# Patient Record
Sex: MALE | Race: BLACK OR AFRICAN AMERICAN | NOT HISPANIC OR LATINO | Employment: OTHER | URBAN - METROPOLITAN AREA
[De-identification: names, ages, dates, MRNs, and addresses within clinical notes are randomized per-mention and may not be internally consistent; named-entity substitution may affect disease eponyms.]

---

## 2017-02-14 ENCOUNTER — HOSPITAL ENCOUNTER (INPATIENT)
Facility: HOSPITAL | Age: 72
LOS: 24 days | Discharge: HOME | DRG: 057 | End: 2017-03-10
Attending: PHYSICAL MEDICINE & REHABILITATION | Admitting: PHYSICAL MEDICINE & REHABILITATION
Payer: MEDICARE

## 2017-02-14 DIAGNOSIS — I63.9 CEREBROVASCULAR ACCIDENT (CVA), UNSPECIFIED MECHANISM: ICD-10-CM

## 2017-02-14 DIAGNOSIS — I73.9 PVD (PERIPHERAL VASCULAR DISEASE): ICD-10-CM

## 2017-02-14 DIAGNOSIS — Z95.810 PRESENCE OF IMPLANTABLE CARDIOVERTER-DEFIBRILLATOR (ICD): ICD-10-CM

## 2017-02-14 DIAGNOSIS — Z72.0 TOBACCO ABUSE: ICD-10-CM

## 2017-02-14 DIAGNOSIS — I25.10 CORONARY ARTERY DISEASE, ANGINA PRESENCE UNSPECIFIED, UNSPECIFIED VESSEL OR LESION TYPE, UNSPECIFIED WHETHER NATIVE OR TRANSPLANTED HEART: ICD-10-CM

## 2017-02-14 DIAGNOSIS — E44.1 MILD MALNUTRITION: ICD-10-CM

## 2017-02-14 DIAGNOSIS — Z91.199 H/O NONCOMPLIANCE WITH MEDICAL TREATMENT, PRESENTING HAZARDS TO HEALTH: ICD-10-CM

## 2017-02-14 DIAGNOSIS — I25.5 ISCHEMIC CARDIOMYOPATHY: ICD-10-CM

## 2017-02-14 DIAGNOSIS — I63.9 CVA (CEREBRAL VASCULAR ACCIDENT): ICD-10-CM

## 2017-02-14 PROCEDURE — 25000003 PHARM REV CODE 250: Performed by: PHYSICAL MEDICINE & REHABILITATION

## 2017-02-14 PROCEDURE — 12800000 HC REHAB SEMI-PRIVATE ROOM

## 2017-02-14 RX ORDER — CLOPIDOGREL BISULFATE 75 MG/1
75 TABLET ORAL DAILY
Status: DISCONTINUED | OUTPATIENT
Start: 2017-02-15 | End: 2017-03-10 | Stop reason: HOSPADM

## 2017-02-14 RX ORDER — LISINOPRIL 2.5 MG/1
2.5 TABLET ORAL DAILY
Status: DISCONTINUED | OUTPATIENT
Start: 2017-02-15 | End: 2017-03-10 | Stop reason: HOSPADM

## 2017-02-14 RX ORDER — ASPIRIN 81 MG/1
81 TABLET ORAL DAILY
Status: DISCONTINUED | OUTPATIENT
Start: 2017-02-15 | End: 2017-03-10 | Stop reason: HOSPADM

## 2017-02-14 RX ORDER — METOPROLOL TARTRATE 25 MG/1
25 TABLET, FILM COATED ORAL 2 TIMES DAILY
Status: ON HOLD | COMMUNITY
End: 2017-03-10 | Stop reason: HOSPADM

## 2017-02-14 RX ORDER — METOPROLOL TARTRATE 25 MG/1
25 TABLET, FILM COATED ORAL 2 TIMES DAILY
Status: DISCONTINUED | OUTPATIENT
Start: 2017-02-14 | End: 2017-03-10 | Stop reason: HOSPADM

## 2017-02-14 RX ADMIN — METOPROLOL TARTRATE 25 MG: 25 TABLET ORAL at 08:02

## 2017-02-14 NOTE — IP AVS SNAPSHOT
[address] [place] [address]  Phone: [phone]           Patient Discharge Instructions     Our goal is to set you up for success. This packet includes information on your condition, medications, and your home care. It will help you to care for yourself so you don't get sicker and need to go back to the hospital.     Please ask your nurse if you have any questions.        There are many details to remember when preparing to leave the hospital. Here is what you will need to do:    1. Take your medicine. If you are prescribed medications, review your Medication List in the following pages. You may have new medications to  at the pharmacy and others that you'll need to stop taking. Review the instructions for how and when to take your medications. Talk with your doctor or nurses if you are unsure of what to do.     2. Go to your follow-up appointments. Specific follow-up information is listed in the following pages. Your may be contacted by a transition nurse or clinical provider about future appointments. Be sure we have all of the phone numbers to reach you, if needed. Please contact your provider's office if you are unable to make an appointment.     3. Watch for warning signs. Your doctor or nurse will give you detailed warning signs to watch for and when to call for assistance. These instructions may also include educational information about your condition. If you experience any of warning signs to your health, call your doctor.               ** Verify the list of medication(s) below is accurate and up to date. Carry this with you in case of emergency. If your medications have changed, please notify your healthcare provider.             Medication List      START taking these medications        Additional Info                      aspirin 81 MG EC tablet   Commonly known as:  ECOTRIN   Quantity:  30 tablet   Refills:  1   Dose:  81 mg    Last time this  was given:  81 mg on 3/10/2017  9:15 AM   Instructions:  Take 1 tablet (81 mg total) by mouth once daily.     Begin Date    AM    Noon    PM    Bedtime       atorvastatin 80 MG tablet   Commonly known as:  LIPITOR   Quantity:  30 tablet   Refills:  1   Dose:  80 mg    Last time this was given:  80 mg on 3/10/2017  9:14 AM   Instructions:  Take 1 tablet (80 mg total) by mouth once daily.     Begin Date    AM    Noon    PM    Bedtime       brimonidine 0.1% 0.1 % Drop   Commonly known as:  ALPHAGAN P   Quantity:  10 mL   Refills:  1   Dose:  1 drop    Last time this was given:  1 drop on 3/10/2017  9:14 AM   Instructions:  Place 1 drop into both eyes 2 (two) times daily.     Begin Date    AM    Noon    PM    Bedtime       clopidogrel 75 mg tablet   Commonly known as:  PLAVIX   Quantity:  30 tablet   Refills:  1   Dose:  75 mg    Last time this was given:  75 mg on 3/10/2017  9:14 AM   Instructions:  Take 1 tablet (75 mg total) by mouth once daily.     Begin Date    AM    Noon    PM    Bedtime       docusate sodium 100 MG capsule   Commonly known as:  COLACE   Quantity:  30 capsule   Refills:  1   Dose:  100 mg    Last time this was given:  100 mg on 3/10/2017  9:15 AM   Instructions:  Take 1 capsule (100 mg total) by mouth once daily.     Begin Date    AM    Noon    PM    Bedtime       dorzolamide-timolol 2-0.5% 22.3-6.8 mg/mL ophthalmic solution   Commonly known as:  COSOPT   Quantity:  10 mL   Refills:  1   Dose:  1 drop    Last time this was given:  1 drop on 3/10/2017  9:13 AM   Instructions:  Place 1 drop into both eyes 2 (two) times daily.     Begin Date    AM    Noon    PM    Bedtime       latanoprost 0.005 % ophthalmic solution   Quantity:  2.5 mL   Refills:  1   Dose:  1 drop    Last time this was given:  1 drop on 3/9/2017  9:06 PM   Instructions:  Place 1 drop into both eyes every evening.     Begin Date    AM    Noon    PM    Bedtime       lisinopril 2.5 MG tablet   Commonly known as:  PRINIVIL,ZESTRIL    Quantity:  30 tablet   Refills:  1   Dose:  2.5 mg    Last time this was given:  2.5 mg on 3/9/2017  8:55 AM   Instructions:  Take 1 tablet (2.5 mg total) by mouth once daily.     Begin Date    AM    Noon    PM    Bedtime         CONTINUE taking these medications        Additional Info                      metoprolol tartrate 25 MG tablet   Commonly known as:  LOPRESSOR   Quantity:  30 tablet   Refills:  1   Dose:  25 mg    Last time this was given:  25 mg on 3/9/2017  9:05 PM   Instructions:  Take 1 tablet (25 mg total) by mouth 2 (two) times daily.     Begin Date    AM    Noon    PM    Bedtime            Where to Get Your Medications      You can get these medications from any pharmacy     Bring a paper prescription for each of these medications     aspirin 81 MG EC tablet    atorvastatin 80 MG tablet    brimonidine 0.1% 0.1 % Drop    clopidogrel 75 mg tablet    docusate sodium 100 MG capsule    dorzolamide-timolol 2-0.5% 22.3-6.8 mg/mL ophthalmic solution    latanoprost 0.005 % ophthalmic solution    lisinopril 2.5 MG tablet    metoprolol tartrate 25 MG tablet                  Please bring to all follow up appointments:    1. A copy of your discharge instructions.  2. All medicines you are currently taking in their original bottles.  3. Identification and insurance card.    Please arrive 15 minutes ahead of scheduled appointment time.    Please call 24 hours in advance if you must reschedule your appointment and/or time.        Follow-up Information     Follow up with Munir Claros MD.    Specialty:  Cardiology    Why:  follow up with cardiology within 2 weeks for re-evaluation and to have your device (ICD) checked     Contact information:    [facility]ILL[facility]  [address] FLO[address]  [facility] [id number] [phone]          Follow up with Primary Doctor No In 2 weeks.    Why:  Follow up after hospital discharge          Discharge Instructions       Wheelchair and rolling walker for home use ordered from [facility] in [place].  Their phone # is [phone].    Referral made to [facility] [place] [facility] for PT.  Their phone # is [phone]. Your first appt will be Monday, 3/13/17 @ 1:00.  Please arrive 45 minutes early and go to the out-patient registration desk.       Monitor patient heart rate, and do not give Metoprolol 25 if heart rate less than 60 and notify MD for further instructions.   Monitor patient blood pressure and do not give Lisinopril 2.5mg if less than 110/70 and notify MD for further instructions.       Admission Information     Date & Time Provider Department CSN    2/14/2017  7:23 PM Aleah Garrido MD [facility] [id number]       Admisson Diagnosis: CVA (cerebral vascular accident)      Care Providers     Provider Role Specialty Primary office phone    Aleah Garrido MD Attending Provider General Practice [phone]    Leland Heredia MD Consulting Physician  Cardiology  [phone]    Jaja Mahoney NP Consulting Provider  Cardiology  [phone]      Your Vitals Were     BP Pulse Temp Resp Height Weight    92/60 52 98.5 °F (36.9 °C) (Oral) 18 5' 6" (1.676 m) 67.4 kg (148 lb 8 oz)    SpO2 BMI             97% 23.97 kg/m2         Recent Lab Values     No lab values to display.      Allergies as of 3/10/2017     No Known Allergies      [facility]     [facility] Nurse Care Line - 24/7 Assistance  Unless otherwise directed by your provider, please contact [facility], our nurse care line that is available for 24/7 assistance.     Registered nurses in the [facility] provide clinical advisement, health education, appointment booking, and other advisory services.  Call for this free service at [phone].        Advance Directives     An advance directive is a document which, in the event you are no longer able to make decisions for yourself, tells your healthcare team what kind of treatment you do or do not want to " receive, or who you would like to make those decisions for you.  If you do not currently have an advance directive, [facility] encourages you to create one.  For more information call:  [phone]), [phone] ([phone]), or log on to [email or web address].        Smoking Cessation     If you would like to quit smoking:   You may be eligible for free services if you are a [place] resident and started smoking cigarettes before September 1, 1988.  Call the [facility] toll free at [phone] or [phone].      Call 3-526-QUIT-NOW if you do not meet the above criteria.        Translation Services Information     ATTENTION: Language assistance services are available, free of charge. Please call [phone].    ATENCIÓN: Si habla español, tiene a ramesh disposición servicios gratuitos de asistencia lingüística. Llame al [phone].     [facility] Ý: N?u b?n nói Ti?ng Vi?t, có các d?ch v? h? tr? ngôn ng? mi?n phí dành cho b?n. G?i s? [phone].        Stroke Education              [facility] Sign-Up     Activating your [facility] account is as easy as 1-2-3!     1) Visit [email or web address], select Sign Up Now, enter this activation code and your date of birth, then select Next.  [email or web address]  Expires: 4/24/2017 12:09 PM      2) Create a username and password to use when you visit [facility] in the future and select a security question in case you lose your password and select Next.    3) Enter your e-mail address and click Sign Up!    Additional Information  If you have questions, please e-mail [email or web address] or call [phone] to talk to our [facility] staff. Remember, [facility] is NOT to be used for urgent needs. For medical emergencies, dial 911.          [facility] complies with applicable Federal civil rights laws and does not discriminate on the basis of race, color, national origin, age, disability, or sex.

## 2017-02-15 LAB
ALBUMIN SERPL BCP-MCNC: 3.6 G/DL
ALP SERPL-CCNC: 73 U/L
ALT SERPL W/O P-5'-P-CCNC: 16 U/L
ANION GAP SERPL CALC-SCNC: 11 MMOL/L
AST SERPL-CCNC: 21 U/L
BASOPHILS # BLD AUTO: 0 K/UL
BASOPHILS NFR BLD: 0.6 %
BILIRUB SERPL-MCNC: 0.5 MG/DL
BILIRUB UR QL STRIP: NEGATIVE
BUN SERPL-MCNC: 19 MG/DL
CALCIUM SERPL-MCNC: 9.7 MG/DL
CHLORIDE SERPL-SCNC: 106 MMOL/L
CLARITY UR: CLEAR
CO2 SERPL-SCNC: 20 MMOL/L
COLOR UR: YELLOW
CREAT SERPL-MCNC: 1.1 MG/DL
DIFFERENTIAL METHOD: ABNORMAL
EOSINOPHIL # BLD AUTO: 0.1 K/UL
EOSINOPHIL NFR BLD: 1.9 %
ERYTHROCYTE [DISTWIDTH] IN BLOOD BY AUTOMATED COUNT: 15.1 %
EST. GFR  (AFRICAN AMERICAN): >60 ML/MIN/1.73 M^2
EST. GFR  (NON AFRICAN AMERICAN): >60 ML/MIN/1.73 M^2
GIANT PLATELETS BLD QL SMEAR: PRESENT
GLUCOSE SERPL-MCNC: 97 MG/DL
GLUCOSE UR QL STRIP: NEGATIVE
HCT VFR BLD AUTO: 49.5 %
HGB BLD-MCNC: 16.4 G/DL
HGB UR QL STRIP: NEGATIVE
KETONES UR QL STRIP: NEGATIVE
LEUKOCYTE ESTERASE UR QL STRIP: NEGATIVE
LYMPHOCYTES # BLD AUTO: 2 K/UL
LYMPHOCYTES NFR BLD: 28.9 %
MCH RBC QN AUTO: 27.8 PG
MCHC RBC AUTO-ENTMCNC: 33 %
MCV RBC AUTO: 84 FL
MONOCYTES # BLD AUTO: 0.9 K/UL
MONOCYTES NFR BLD: 12.7 %
NEUTROPHILS # BLD AUTO: 3.9 K/UL
NEUTROPHILS NFR BLD: 55.9 %
NITRITE UR QL STRIP: NEGATIVE
PH UR STRIP: 7 [PH] (ref 5–8)
PLATELET # BLD AUTO: 201 K/UL
PLATELET BLD QL SMEAR: ABNORMAL
PMV BLD AUTO: 11.2 FL
POTASSIUM SERPL-SCNC: 4.3 MMOL/L
PROT SERPL-MCNC: 7.5 G/DL
PROT UR QL STRIP: NEGATIVE
RBC # BLD AUTO: 5.88 M/UL
SODIUM SERPL-SCNC: 137 MMOL/L
SP GR UR STRIP: 1.01 (ref 1–1.03)
URN SPEC COLLECT METH UR: NORMAL
UROBILINOGEN UR STRIP-ACNC: NEGATIVE EU/DL
WBC # BLD AUTO: 7.1 K/UL

## 2017-02-15 PROCEDURE — 97162 PT EVAL MOD COMPLEX 30 MIN: CPT

## 2017-02-15 PROCEDURE — 85025 COMPLETE CBC W/AUTO DIFF WBC: CPT

## 2017-02-15 PROCEDURE — 80053 COMPREHEN METABOLIC PANEL: CPT

## 2017-02-15 PROCEDURE — 97530 THERAPEUTIC ACTIVITIES: CPT

## 2017-02-15 PROCEDURE — 36415 COLL VENOUS BLD VENIPUNCTURE: CPT

## 2017-02-15 PROCEDURE — 12800000 HC REHAB SEMI-PRIVATE ROOM

## 2017-02-15 PROCEDURE — 81003 URINALYSIS AUTO W/O SCOPE: CPT

## 2017-02-15 PROCEDURE — 87086 URINE CULTURE/COLONY COUNT: CPT

## 2017-02-15 PROCEDURE — 97165 OT EVAL LOW COMPLEX 30 MIN: CPT

## 2017-02-15 PROCEDURE — 25000003 PHARM REV CODE 250: Performed by: PHYSICAL MEDICINE & REHABILITATION

## 2017-02-15 PROCEDURE — 92610 EVALUATE SWALLOWING FUNCTION: CPT

## 2017-02-15 RX ORDER — LACTULOSE 10 G/15ML
20 SOLUTION ORAL DAILY PRN
Status: DISCONTINUED | OUTPATIENT
Start: 2017-02-15 | End: 2017-03-10

## 2017-02-15 RX ORDER — ENOXAPARIN SODIUM 100 MG/ML
40 INJECTION SUBCUTANEOUS EVERY 24 HOURS
Status: DISCONTINUED | OUTPATIENT
Start: 2017-02-16 | End: 2017-03-10 | Stop reason: HOSPADM

## 2017-02-15 RX ORDER — BISACODYL 10 MG
10 SUPPOSITORY, RECTAL RECTAL DAILY PRN
Status: DISCONTINUED | OUTPATIENT
Start: 2017-02-15 | End: 2017-03-10

## 2017-02-15 RX ORDER — DOCUSATE SODIUM 100 MG/1
100 CAPSULE, LIQUID FILLED ORAL DAILY
Status: DISCONTINUED | OUTPATIENT
Start: 2017-02-16 | End: 2017-03-10 | Stop reason: HOSPADM

## 2017-02-15 RX ADMIN — ASPIRIN 81 MG: 81 TABLET, COATED ORAL at 08:02

## 2017-02-15 RX ADMIN — CLOPIDOGREL BISULFATE 75 MG: 75 TABLET ORAL at 08:02

## 2017-02-15 RX ADMIN — METOPROLOL TARTRATE 25 MG: 25 TABLET ORAL at 08:02

## 2017-02-15 NOTE — PT/OT/SLP EVAL
"PhysicalTherapy   Evaluation    Forest Villanueva Jr.   MRN: [id number]     PT Received On: 02/15/17  PT Start Time: 1320     PT Stop Time: 1415    PT Total Time (min): 55 min       Billable Minutes:  Evaluation 45  Total Minutes: 45    Diagnosis:  CVA  History reviewed. No pertinent past medical history.   Past Surgical History   Procedure Laterality Date    Eye surgery      Coronary artery bypass graft      Cardiac pacemaker placement         Referring physician: Hema  Date referred to PT: 2017    General Precautions: Standard, fall, pacemaker       Patient History:  Living Arrangements: house  Home Layout: Able to live on 1st floor  Equipment Currently Used at Home: cane, straight      Previous Level of Function: (per pt)  Ambulation Skills: needs device  Transfer Skills: independent  ADL Skills: independent    Subjective:  Patient goals: "restore my strength"    Pain Ratin/10  Location - Side: Left     Location: chest  Pain Addressed: Distraction, Reposition, Cessation of Activity  Pain Rating Post-Intervention: 2/10    Objective:  Patient found seated in w/c.  Patient found with: peripheral IV    Cognitive Exam:    Physical Exam:    Upper Extremity Range of Motion:  Refer to OT evaluation for UE ROM.    Upper Extremity Strength:  Refer to OT evaluation for UE strength.    Lower Extremity Range of Motion:  Right Lower Extremity: WFL  Left Lower Extremity: WFL    Lower Extremity Strength:  Right Lower Extremity: Hip flex 3-/5, knee exten 4-/5  Left Lower Extremity: Hip flex 4/5, knee exten 4+/5     Functional Mobility:    Bed Mobility :   Supine to sit: Moderate Assistance   Sit to supine: Moderate Assistance   Rolling: Maximum Assistance     Transfers:  Sit to stand:Moderate Assistance with Rolling Walker  x 3  Bed <> Chair:  Stand Pivot with Moderate Assistance with No Assistive Device      Wheelchair: 100' with min assist and cues    Gait: 25' with RW with mod assist and cues     Balance:   Static " Sit: FAIR: Maintains without assist, but unable to take any challenges   Dynamic Sit:  FAIR: Cannot move trunk without losing balance  Static Stand: POOR: Needs MODERATE assist to maintain  Dynamic stand: POOR: N/A    Endurance deficit:  Only able to ambulate 25'     Patient left supine with call button in reach.    Assessment:  Forest Villanueva Jr. is a 71 y.o. male with a medical diagnosis of CVA. He presents with limited functional mobility. Will benefit from PT to address above deficits.    Rehab potential is good.    Activity tolerance: Fair    Plan: plan care intiated, bed mobility initiated, transfer training iniated, gait training, balance training, strengthening, neuromuscular re-education, motor coordination training and wheelchair management/propulsion    GOALS:   Physical Therapy Goals        Problem: Physical Therapy Goal    Goal Priority Disciplines Outcome Goal Variances Interventions   Physical Therapy Goal     PT/OT, PT      Description:  Goals to be met by: 3/17/2017     Patient will increase functional independence with mobility by performin). Supine to sit with Modified [place]  2). Sit to supine with Modified [place]  3). Sit to stand transfer with Stand-by Assistance  4). Bed to chair transfer with Stand-by Assistance   5). Gait  x > 150 feet with Stand-by Assistance   6). Wheelchair propulsion x > 200 feet with Modified [place]               PLAN:    Patient to be seen daily to address the above listed problems via gait training, therapeutic activities, therapeutic exercises, neuromuscular re-education, wheelchair management/training  Plan of Care expires: 17  Plan of Care reviewed with: patient          Christ T Chapo, PT 2/15/2017

## 2017-02-15 NOTE — PLAN OF CARE
Problem: Fall Risk (Adult)  Goal: Identify Related Risk Factors and Signs and Symptoms  Related risk factors and signs and symptoms are identified upon initiation of Human Response Clinical Practice Guideline (CPG)   Outcome: Ongoing (interventions implemented as appropriate)  Pt alert and oriented. Bed and chair alarm at all times. Call light in reach.

## 2017-02-15 NOTE — PT/OT/SLP EVAL
Occupational Therapy  Evaluation            >>>>PACEMAKER PRECAUTIONS<<<<    Forest Villanueva Jr.   MRN: [id number]   Admitting Diagnosis: CVA    OT Date of Treatment: 02/15/17   OT Start Time: 0935  OT Stop Time: 1050  OT Total Time (min): 75 min    Billable Minutes:  Evaluation 60 and Therapeutic Activity 15  Total Minutes: 75    Diagnosis: CVA      History reviewed. No pertinent past medical history.   Past Surgical History   Procedure Laterality Date    Eye surgery      Coronary artery bypass graft      Cardiac pacemaker placement         Referring physician: Hema  Date referred to OT: 2/14/17    General Precautions: Standard,    Precautions:  PACEMAKER  Braces:      Do you have any cultural, spiritual, Confucianist conflicts, given your current situation?: Congregation     Patient History:  Living Environment  Lives With: sibling(s) (sister)  Living Arrangements: house  Home Accessibility: stairs to enter home  Home Layout: Able to live on 1st floor  Number of Stairs to Enter Home: 1  Stair Railings at Home: none  Living Environment Comment: Pt lives in [facility] with 1 DEE DEE with sister in [place] with tub/shower combo - PTA, pt completely (I) with all I/ADL and owns no DME. Pt did not drive  Equipment Currently Used at Home: none    Prior level of function:   Bed Mobility/Transfers: independent  Grooming: independent  Bathing: independent  Upper Body Dressing: independent  Lower Body Dressing: independent  Toileting: independent  Home Management Skills:  (shares with sisterNatalie)  Homemaking Responsibilities:  (Shares responsibilities with sisterNatalie)  Driving License: No  Mode of Transportation: Family     Dominant hand: right    Subjective:  Communicated with nurse prior to session.    Chief Complaint: Needing to urinate, wanting to smoke a cigarette, and weakness  Patient/Family stated goals: For patient to get stronger and return home; patient's sister also reports she would like to ask the doctor if patient can  get on a medication for bladder urgency, get a nicotine patch and reports patient was receiving medication for sinuses and eye drops (daily) - all information and requests communicated to pt's nurse, Chito Dale acknowledging    Pain Ratin/10        Pain Rating Post-Intervention: 0/10    Objective:  Patient found with: peripheral IV and pacemaker    Cognitive Exam:  Oriented to: Person only  Follows Commands/attention: Follows one-step commands - pt given increased time for processing  Communication: dysarthria  Memory:  See ST eval  Safety awareness/insight to disability: impaired  Coping skills/emotional control: Appropriate to situation for majority of evaluation; at start of OT evaluation, pt tearful when sister and other family arriving     Visual/perceptual:  Pt wears bifocals; otherwise intact to perform self care tasks  -  To be further assessed    Physical Exam:  Postural examination/scapula alignment: Rounded shoulder and Head forward  Skin integrity: Visible skin intact  Edema:    Sensation:   Light touch intact bilaterally    Upper Extremity Range of Motion:  Right Upper Extremity: WFL  Left Upper Extremity: (L) shoulder limited 2* pacemaker precautions; distally, WFL    Upper Extremity Strength:  Right Upper Extremity: 3-/5 to 3+/5   Left Upper Extremity: N/T 2* pacemaker precautions; functional   Strength: 3+/5 bilaterally    Fine motor coordination:   Impaired  Left hand thumb/finger opposition skills mild/mod, Left hand, manipulation of objects mild/mod     Gross motor coordination: L UE mild impairment     Functional Mobility:  Bed Mobility:   Supine to sit: Total (A) - Mod/Max (A) x 2 staff members   Scooting: Total (A) - Mod/Max (A) x 2 staff members    Transfers:   Sit to stand:Total Assistance with grab bar once standing OTR providing instruction for correct/safe transfer technique & proper hand placement - Mod/Min (A) x 2 ppl for pt/staff safety  Bed <> Chair:  Stand Pivot with  Total Assistance with No Assistive Device OTR providing skilled instruction for correct t/f technique & proper hand placement - Mod/Max (A) x 2 ppl for pt/staff safety  Toilet Transfer:  Pt Stand Pivot with Total Assistance with grab bar once standing and bedside commode positioned over toilet - OTR providing skilled instruction for correct t/f technique & proper hand placement - Mod/Max (A) x 2 ppl for pt/staff safety    Feeding:  Min (A)    Grooming:  Min (A) - step by step instruction with occasional [place] to perform grooming tasks sitting sink side    Bathing:  Max (A)    UE Dressing:  Mod (A) from wheelchair level - pt instructed on edwardo dressing techniques; step by step instruction provided with occasional [place] and Mod (A) to complete taskis    LE Dressing:  Patient don/doffed socks with Total Assistance, Patient don/doffed shoes with Total Assistance, Patient don/doffed adult brief with Total Assistance and Patient don/doffed pants with Total Assistance    Toileting:  Pt performed toileting with MaxAssistance with grab bar and bedside commode positioned over toilet     Balance:   Static Sit: SBA/CGA  Dynamic Sit: Min/Mod (A)  Static Stand: Mod (A) with B UE support of grab bar    Additional Treatment:  -Patient, sister, and additional family members educated/instructed on OT role/POC, inpatient rehab therapy and schedule, visiting hours, safety - calling for (A), correct transfer techniques, adaptive techniques to increase functional independence, etc - pt will benefit from daily reinforcement of all education    Patient left up in chair with all lines intact, call button in reach, chair alarm on, nurse notified and family members present    Assessment:  Forest Villanueva Jr. is a 71 y.o. male with a medical diagnosis of CVA and presents with decreased functional independence as prior to admit, pt completely independent with all ADL/IADL without use of DME, but not driving. Currently, pt requires Total (A) to  perform functional transfers and self care tasks and demonstrates weakness, impaired balance, impaired coordination, decreased endurance, disorientation, decreased insight, decreased safety. Patient should benefit from skilled OT services to address deficits and increase functional independence, mobility, and safety.    Rehab potential is good    Activity tolerance: Good    Discharge recommendations: home     Equipment recommendations:  (TBD; likely BSC and transfer tub bench)     GOALS:   Occupational Therapy Goals        Problem: Occupational Therapy Goal    Goal Priority Disciplines Outcome Interventions   Occupational Therapy Goal     OT, PT/OT     Description:  Goals to be met by: 3/14/17     Patient will increase functional independence with ADLs by performing:    Feeding with Set-up Assistance.  UE Dressing with Set-up Assistance.  LE Dressing with Set-up Assistance.  Grooming while seated with Set-up Assistance.  Toileting from toilet with Set-up Assistance for hygiene and clothing management.   Bathing from  shower chair/bench with Set-up Assistance.  Shower transfer with Supervision.  Toilet transfer to toilet with Supervision.                PLAN: Patient to be seen 6 x/week to address the above listed problems via self-care/home management, community/work re-entry, therapeutic activities, therapeutic exercises, therapeutic groups, neuromuscular re-education, cognitive retraining, sensory integration, wheelchair management/training  Plan of Care expires: 03/14/17  Plan of Care reviewed with: patient         GERRY Sheehan LOTR  02/15/2017

## 2017-02-15 NOTE — PLAN OF CARE
Problem: Patient Care Overview  Goal: Plan of Care Review  Outcome: Ongoing (interventions implemented as appropriate)  Reviewed POC, voice understanding, denies discomfort, AOX3, stress incontinence of bladder , transfer min/mod assist, safety maintained

## 2017-02-15 NOTE — PROGRESS NOTES
Spoke with patient and his sister and girlfriend to complete assessment.  Patient lives in [place] with his girlfriend who does not work.  Patient has a cane at home.  Patient has been having Home Health services through [facility].  [facility] uses [facility].  Patient plans to return home with his sister, Natalie Steward wh lives at [address] In [place].  Patient's girlfriend will also help with his care when his sister works parttime.  SW will follow and assist with discharge planning as needed.

## 2017-02-16 LAB — BACTERIA UR CULT: NORMAL

## 2017-02-16 PROCEDURE — 97110 THERAPEUTIC EXERCISES: CPT

## 2017-02-16 PROCEDURE — 12800000 HC REHAB SEMI-PRIVATE ROOM

## 2017-02-16 PROCEDURE — 92523 SPEECH SOUND LANG COMPREHEN: CPT

## 2017-02-16 PROCEDURE — 97535 SELF CARE MNGMENT TRAINING: CPT

## 2017-02-16 PROCEDURE — 97530 THERAPEUTIC ACTIVITIES: CPT

## 2017-02-16 PROCEDURE — 63600175 PHARM REV CODE 636 W HCPCS: Performed by: PHYSICAL MEDICINE & REHABILITATION

## 2017-02-16 PROCEDURE — 25000003 PHARM REV CODE 250: Performed by: PHYSICAL MEDICINE & REHABILITATION

## 2017-02-16 PROCEDURE — 63700000 PHARM REV CODE 250 ALT 637 W/O HCPCS: Performed by: PHYSICAL MEDICINE & REHABILITATION

## 2017-02-16 RX ORDER — LATANOPROST 50 UG/ML
1 SOLUTION/ DROPS OPHTHALMIC NIGHTLY
Status: DISCONTINUED | OUTPATIENT
Start: 2017-02-16 | End: 2017-03-10 | Stop reason: HOSPADM

## 2017-02-16 RX ORDER — DORZOLAMIDE HYDROCHLORIDE AND TIMOLOL MALEATE 20; 5 MG/ML; MG/ML
1 SOLUTION/ DROPS OPHTHALMIC 2 TIMES DAILY
Status: DISCONTINUED | OUTPATIENT
Start: 2017-02-16 | End: 2017-03-10 | Stop reason: HOSPADM

## 2017-02-16 RX ORDER — BRIMONIDINE TARTRATE 1 MG/ML
1 SOLUTION/ DROPS OPHTHALMIC 2 TIMES DAILY
Status: DISCONTINUED | OUTPATIENT
Start: 2017-02-16 | End: 2017-03-10 | Stop reason: HOSPADM

## 2017-02-16 RX ADMIN — DORZOLAMIDE HYDROCHLORIDE AND TIMOLOL MALEATE 1 DROP: 20; 5 SOLUTION/ DROPS OPHTHALMIC at 09:02

## 2017-02-16 RX ADMIN — LATANOPROST 1 DROP: 50 SOLUTION OPHTHALMIC at 09:02

## 2017-02-16 RX ADMIN — METOPROLOL TARTRATE 25 MG: 25 TABLET ORAL at 09:02

## 2017-02-16 RX ADMIN — BRIMONIDINE TARTRATE 1 DROP: 1 SOLUTION/ DROPS OPHTHALMIC at 09:02

## 2017-02-16 RX ADMIN — ASPIRIN 81 MG: 81 TABLET, COATED ORAL at 08:02

## 2017-02-16 RX ADMIN — LACTULOSE 20 G: 10 SOLUTION ORAL at 08:02

## 2017-02-16 RX ADMIN — CLOPIDOGREL BISULFATE 75 MG: 75 TABLET ORAL at 08:02

## 2017-02-16 RX ADMIN — ENOXAPARIN SODIUM 40 MG: 100 INJECTION SUBCUTANEOUS at 12:02

## 2017-02-16 RX ADMIN — DOCUSATE SODIUM 100 MG: 100 CAPSULE, LIQUID FILLED ORAL at 08:02

## 2017-02-16 RX ADMIN — LISINOPRIL 2.5 MG: 2.5 TABLET ORAL at 08:02

## 2017-02-16 NOTE — PT/OT/SLP EVAL
"Speech Language Pathology  Evaluation    Forest Villanueva Jr.   MRN: [id number]   Admitting Diagnosis: The encounter diagnosis was CVA (cerebral vascular accident).    Diet recommendations: Solid Diet Level: Finely chopped meat  Liquid Diet Level: Nectar Thick No straws, Alternating bites/sips, 1 bite/sip at a time, Check for pocketing/oral residue, Meds crushed in puree, Meds whole 1 at a time, Assistance with meals and Assistance with thickening liquids    SLP Treatment Date: 17  Speech Start Time: 935     Speech Stop Time: 1005     Speech Total (min): 30 min       TREATMENT BILLABLE MINUTES:  Eval 30     Diagnosis: The encounter diagnosis was CVA (cerebral vascular accident).    History reviewed. No pertinent past medical history.  Past Surgical History   Procedure Laterality Date    Eye surgery      Coronary artery bypass graft      Cardiac pacemaker placement       Has the patient been evaluated by SLP for swallowing? : Yes  Keep patient NPO?: No   General Precautions: Standard, aspiration, nectar thick, fall, vision impaired        Social Hx: Patient lives with friend in [place]. When asked about family he reports he has 4 brothers and sisters, 3 of whom are , no children.      Diet:.Bedside Swallow Study completed 2/15/17. REC: Dysphagia II with NECTAR-thickened liquids.      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 He likes watching TV and board games.      Subjective:  "I see better out of my R today than my Left"  Patient goals: "To get stronger"    Pain Ratin/10                   Objective: Patient completed evaluation via formal and informal measures including completion of the [place] Cognitive Assessment (MoCA.)       Oral Musculature Evaluation  Oral Musculature: general weakness  Dentition: scattered dentition, teeth in poor condition  Mucosal Quality: good  Mandibular Strength and Mobility: impaired  Oral Labial Strength and Mobility: " impaired pursing, impaired seal  Lingual Strength and Mobility: impaired strength, impaired right lateral movement  Buccal Strength and Mobility: impaired, decreased tone  Volitional Cough: present, productive  Volitional Swallow: present, inconsistent timing of initiation with thin liquids noted  Voice Prior to PO Intake: clear     Cognitive Status: Patient with a score of 8/30 on MoCA.  Behavioral Observations: cooperative, w/ initiation problems and lethargic-  Memory and Orientation: Oriented x3, requires verbal cues to utilize external cues (white board in room) to recall date. LTM appears functional. Patient with moderately impaired immediate STM. Patient recalls 2/3 unrelated items for immediate recall.  Patient with severe difficulty with delayed STM recall. He recalls 0/5 unrelated items for delayed recall on MOCA.   Attention:MOD A. Patient requires verbal/visual cueing to redirect to tx tasks t/o assessment.   Problem Solving: Patient with moderate difficulty on fx math questions; however, answers safety/reasoning questions for ADLs WFL.  Pragmatics: flat affect, poor eye contact, turn taking deficits and initiation problems  Executive Function: Moderately decreased sequencing (FO3), organization, planning and self-correction noted t/o informal assessment    Language: delayed    Auditory Comprehension: Patient completes basic and 2-unit comprehension tasks with minimal verbal repetition from SLP and is able to identify objects, answers yes/no questions and able to follow commands at the one and two-unit level MIN A; however, needs repetition and clarification at the paragraph level.     Verbal Expression: Patient completes sentence competion adn automatic speech tasks WFL.  HE names 6/8 pictures I'luy. he describes picture scene using 2-3 word phrases and moderalty delayed initiation noted t/o all word-finding tasks. Spontaneous speech noted to be more timely    Motor Speech: Moderate Dysarthria    Voice:  Moderately decreased, sustained phonation, volume, rate and prosody    Augmentative Alternative Communication: no    Reading: Patient completes basic number, letter and word tasks for, oral reading ability and comprehension WFL.  Visiospatial impairments  noted to impact comprehension of Grandfather passage this service day.     Writing: DNA 2/2 decreased RUE mobility, ST to continue to assess as status improves.     Visual-Spatial: Impaired. Patient wears eyeglasses, demonstrates decreased R and L scanning today.     Assessment:  Forest Villanueva Jr. is a 71 y.o. male with a medical diagnosis of CVA and presents with Moderate Dysarthria, Cognitive-Linguistic Impairment and Oropharyngeal Dysphagia. Patient with a score of 8/30 on initial [place] Cognitive Assessment (MoCA,) indicating moderate difficulty on serial subtraction, attention, language and STM recall tasks. Please note, writing tasks held 2/2 decreased RUE mobility. Patient with moderately impaired speech characterized by low volume, slow rate and decreased diadochokinetic rates. He completed informal assessment of reading comprehension today. He demonstrates mildly decreased reading comprehension at the paragraph level.  Visiospatial deficits noted to impact reading comprehension and safety.  Delayed initiation and prolonged response time noted t/o assessment. Patient with flat affect and appears withdrawn.  SLP to update POC to address deficits    Do you have any cultural, spiritual, Taoist conflicts, given your current situation?: Confucianism    Discharge recommendations: Discharge Facility/Level Of Care Needs: home     Goals:   SLP Goals        Problem: SLP Goal    Goal Priority Disciplines Outcome   SLP Goal     SLP Ongoing (interventions implemented as appropriate)   Description:  Short Term Goals:  1. Continue to assess cognitive-linguistic skills  2. Patient will tolerate Level II Dysphagia DIet (finely chopped meats) with NECTAR-thickened liquids  w/o overt S/S aspiration, MIN A, to improve swallow  3. Patient will complete OMEs x20 ea, MIN A, to improve R lingual/labial coordination and strength  4. Patient will attend to items on R side of tray with MIN A for demonstrated use of compensatory strategies for visiospatial skills    Long Term Goals:  1. Patient will tolerate least restrictive diet without S/S aspiration, and good oral clearance, Supervision  2. Educate Patient and family on POC and compensatory strategies for safe swallow                  Plan: REC: ST minimum of 30 minutes/day, 5x/week, for 3-4 weeks.   Patient to be seen Therapy Frequency: 5 x/week   Plan of Care expires:  (tbd)  Plan of Care reviewed with: patient  SLP Follow-up?: Yes  SLP - Next Visit Date: 02/17/17           Nicolle Pulliam CCC-SLP  02/16/2017

## 2017-02-16 NOTE — PLAN OF CARE
Problem: Patient Care Overview  Goal: Plan of Care Review  Outcome: Ongoing (interventions implemented as appropriate)  Pt alert and oriented. POC reviewed with pt and family. Min assist with transfers, Dysphasia II diet with nectar thick liquids.

## 2017-02-16 NOTE — PLAN OF CARE
Problem: Patient Care Overview  Goal: Plan of Care Review  Outcome: Ongoing (interventions implemented as appropriate)  POC reviewed, voice understanding, incontinent of b&B, denies discomfort, safety maintained

## 2017-02-16 NOTE — PLAN OF CARE
Problem: SLP Goal  Goal: SLP Goal  Short Term Goals:  1. Continue to assess cognitive-linguistic skills  2. Patient will tolerate Level II Dysphagia DIet (finely chopped meats) with NECTAR-thickened liquids w/o overt S/S aspiration, MIN A, to improve swallow  3. Patient will complete OMEs x20 ea, MIN A, to improve R lingual/labial coordination and strength  4. Patient will attend to items on R side of tray with MIN A for demonstrated use of compensatory strategies for visiospatial skills    Long Term Goals:  1. Patient will tolerate least restrictive diet without S/S aspiration, and good oral clearance, Supervision  2. Educate Patient and family on POC and compensatory strategies for safe swallow   Outcome: Ongoing (interventions implemented as appropriate)  Clinical Swallow Evaluation completed. Patient presents with S/S Mild-moderate Oropharyngeal Dysphagia. Informal assessment of speech, language and cognitive skills initiated.  Patient is alert and oriented x4.  He demonstrates delayed responses across naming and command following tasks. Patient with decreased R visoperception and mildly impaired STM for immediate recall. Patient would benefit from full SLP evaluation.  Please order if agree. REC: Dysphagia LEvel II diet with NECTAR-thickened liquids, one bite at a time, no straws, and Supervision with all meals for safety. ST to initiate tx min 30 min/day, 5x/week, for 3-4 weeks to improve swallow and continue to assess speech, language and cognitive skills.

## 2017-02-16 NOTE — PT/OT/SLP EVAL
"Speech Language Pathology  Evaluation    Forest Villanueva Jr.   MRN: [id number]   Admitting Diagnosis: The encounter diagnosis was CVA (cerebral vascular accident).    Diet recommendations: Solid Diet Level: Finely chopped meat (Dyspahgia Level 2)  Liquid Diet Level: Nectar Thick Feed only when awake/alert, No straws, HOB to 90 degrees, Alternating bites/sips, 1 bite/sip at a time, Meds crushed in puree, Eliminate distractions, Assistance with meals and Assistance with thickening liquids    SLP Treatment Date: 02/15/17  Speech Start Time: 1206     Speech Stop Time: 1310     Speech Total (min): 64 min       TREATMENT BILLABLE MINUTES:  Eval Swallow and Oral Function 64    Diagnosis:The encounter diagnosis was CVA (cerebral vascular accident).      History reviewed. No pertinent past medical history.  Past Surgical History   Procedure Laterality Date    Eye surgery      Coronary artery bypass graft      Cardiac pacemaker placement         Has the patient been evaluated by SLP for swallowing? : Yes  Keep patient NPO?: No   General Precautions: Standard, aspiration, fall, vision impaired, pacemaker          Social Hx: Patient lives with friend in [place]. When asked about family he reports he has 4 brothers and sisters, 3 of whom are , no children.     Prior diet: regular, thin.    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 He likes watching TV and board games.     Subjective:  Patient presents calm and cooperative. Family at bedside upon SLP entrance to room and report," He needs his food cut up for him still."  Patient shares, "I don't eat spaghetti."  Patient goals: "To get home."    Pain Ratin/10                   Objective:   Patient found with: peripheral IV    Oral Musculature Evaluation  Oral Musculature: general weakness  Dentition: scattered dentition, teeth in poor condition  Mucosal Quality: good  Mandibular Strength and Mobility: impaired  Oral Labial " Strength and Mobility: impaired pursing, impaired seal  Lingual Strength and Mobility: impaired strength, impaired right lateral movement  Buccal Strength and Mobility: impaired, decreased tone  Volitional Cough: present, productive  Volitional Swallow: present, inconsistent timing of initiation with thin liquids noted  Voice Prior to PO Intake: clear     Bedside Swallow Eval:  Consistencies Assessed: Thin liquids cup sips thin, straw sips thin, Nectar thick liquids cup sips, Puree apple sauce, Soft solids fruit cup, spaghetti and Solids turkey sandwich  Oral Phase: anterior loss, excess chewing, slow oral transit time and spitting out of food/liquid  Pharyngeal Phase: coughing/choking and throat clearing   Patient seen with lunch tray items for regular (loq sodium) diet with thin liquids. Review of the oral mechanism revealed Patient with missing dentition, R labial/lingual weakness and lose R molar. Patient with moderately prolonged oral prep of all consistencies/ Patient with moderate anterior loss of semi-soft and solid items, requiring moderate verbal/visual cueing from SLP to take one bite at time and finish each bite before taking the next. Patient with mild R pocketing requiring use of liquid wash between bites to clear. Patient with overt coughing/choking on bites of fruit cup. Patient with throat clear on cup sips thin liquids and delayed cough on straw sips of thin liquids.  Patient requires 40 minutes to complete 50% of meal tray items 2/2 prolonged chewing and delayed A-P transit. REC: Dysphagia LEvel II diet with NECTAR-thickened liquids, one bite at a time, no straws, and Supervision with all meals for safety. Precautions posted in room and reviewed with nurse. Nurse and Patient v/u.     Additional Treatment:  Informal assessment of speech, language and cognitive skills initiated.  Eval orders currently only for swallow tx. REC: full SLP evaluation.     FIM:                                  Assessment:  Forest Villanueva Jr. is a 71 y.o. male with a medical diagnosis of <principal problem not specified> and presents with Mild-Moderate Oral Pharyngeal Dysphagia.  Clinical Swallow Evaluation completed. Informal assessment of speech, language and cognitive skills initiated.  Patient is alert and oriented x4.  He demonstrates delayed responses across naming and command following tasks. Patient with decreased R visoperception and mildly impaired STM for immediate recall.  Informal assessment of speech, language and cognitive skills initiated. Patient would benefit from full SLP evaluation.  Please order if agree. REC: Dysphagia LEvel II diet with NECTAR-thickened liquids, one bite at a time, no straws, and Supervision with all meals for safety. ST to initiate tx min 30 min/day, 5x/week, for 3-4 weeks to improve swallow and continue to assess speech, language and cognitive skills.      Do you have any cultural, spiritual, Episcopalian conflicts, given your current situation?: Sabianism     Discharge recommendations: Discharge Facility/Level Of Care Needs: home     Diet recommendations: Solid Diet Level: Finely chopped meat (Dyspahgia Level 2)  Liquid Diet Level: Nectar Thick     Goals:   SLP Goals        Problem: SLP Goal    Goal Priority Disciplines Outcome   SLP Goal     SLP Ongoing (interventions implemented as appropriate)   Description:  Short Term Goals:  1. Continue to assess cognitive-linguistic skills  2. Patient will tolerate Level II Dysphagia DIet (finely chopped meats) with NECTAR-thickened liquids w/o overt S/S aspiration, MIN A, to improve swallow  3. Patient will complete OMEs x20 ea, MIN A, to improve R lingual/labial coordination and strength  4. Patient will attend to items on R side of tray with MIN A for demonstrated use of compensatory strategies for visiospatial skills    Long Term Goals:  1. Patient will tolerate least restrictive diet without S/S aspiration, and good oral clearance,  Supervision  2. Educate Patient and family on POC and compensatory strategies for safe swallow                  Plan: REC: Dysphagia LEvel II diet with NECTAR-thickened liquids, one bite at a time, no straws, and Supervision with all meals for safety. Patient would benefit from full SLP assessment.   ST to initiate tx min 30 min/day, 5x/week, for 3-4 weeks to improve swallow and continue to assess speech, language and cognitive skills.   Patient to be seen Therapy Frequency: 5 x/week   Plan of Care expires:  (tbd)  Plan of Care reviewed with: patient  SLP Follow-up?: Yes  SLP - Next Visit Date: 02/16/17           Nicolle Pulliam CCC-SLP  02/15/2017

## 2017-02-16 NOTE — PT/OT/SLP PROGRESS
Physical Therapy         Treatment        Forest Villanueva Jr.   MRN: [id number]     PT Received On: 17  Total Time (min): 75        Billable Minutes:  Therapeutic Activity 35 and Therapeutic Exercise 40  Total Minutes: 75    Treatment Type: Treatment  PT/PTA: PT     PTA Visit Number: 0       General Precautions: Standard, fall       Subjective:  Pain Ratin/10              Pain Rating Post-Intervention: 0/10    Objective:  Patient found R sidelying in bed, in NAD.      Functional Mobility:  Bed Mobility:   Supine to sit: Moderate Assistance   Sit to supine: Minimal Assistance     Transfer Training:  Sit to stand:Minimal Assistance with Rolling Walker  x 5  Bed <> Chair:  Stand Pivot with Minimal Assistance with No Assistive Device to/from bed    Wheelchair Trainin' (very slow) with min assist and cues    Gait Trainin' x 1, 75' x 1 (very slow) with RW with min assist and cues    Additional Treatment:  SUPINE: SLR, ankle DF, hip abd/add, x 30 reps each  SciFit StepOne: L 1.0 x 15 min, LEs only         Activity Tolerance:  Patient tolerated treatment well    Patient left supine with call button in reach and bed alarm on.    Assessment:  Forest Villanueva Jr. is a 71 y.o. male     Rehab potential is good.    Activity tolerance: Fair    GOALS:   Physical Therapy Goals        Problem: Physical Therapy Goal    Goal Priority Disciplines Outcome Goal Variances Interventions   Physical Therapy Goal     PT/OT, PT      Description:  Goals to be met by: 3/17/2017     Patient will increase functional independence with mobility by performin). Supine to sit with Modified [place]  2). Sit to supine with Modified [place]  3). Sit to stand transfer with Stand-by Assistance  4). Bed to chair transfer with Stand-by Assistance   5). Gait  x > 150 feet with Stand-by Assistance   6). Wheelchair propulsion x > 200 feet with Modified [place]               PLAN:    Patient to be seen daily  to address the  above listed problems via gait training, therapeutic activities, therapeutic exercises, neuromuscular re-education, wheelchair management/training  Plan of Care expires: 03/17/17  Plan of Care reviewed with: patient         Christ FELIX Chapo, PT 2/16/2017

## 2017-02-16 NOTE — PT/OT/SLP PROGRESS
Occupational Therapy  Treatment    Forest Villanueva Jr.   MRN: [id number]   Admitting Diagnosis:     OT Date of Treatment: 17   Total Time (min): 75 min    Billable Minutes:  Self Care/Home Management 60 and Therapeutic Activity 15  Total Minutes: 75    General Precautions: Standard, aspiration, fall, pacemaker, nectar thick, vision impaired (PACEMAKER)  Orthopedic Precautions: N/A  Braces: N/A    Do you have any cultural, spiritual, Episcopalian conflicts, given your current situation?: Nondenominational    Subjective:  Communicated with nurse prior to session.    Pain Ratin/10              Pain Rating Post-Intervention: 0/10    Objective:  Patient found with:  (Pacemaker )    Functional Mobility:  Bed Mobility:   Supine to sit: Moderate Assistance     Transfer Training:  >>Patient requires verbal instruction with additional cues to adhere to pacemaker precautions and maintain correct positioning of L UE throughout transfers   Sit to stand:Moderate Assistance with Grab bars once standing with verbal instruction for correct t/f technique/proper hand placement  Bed <> Chair:  Squat Pivot with Moderate Assistance with No Assistive Device instruction for correct/safe transfer technique and proper hand placement  Toilet Transfer:  Pt Stand Pivot with Moderate Assistance with grab bar and bedside commode positioned over toilet instruction for t/f tech & proper hand placement  Tub bench transfer Stand Pivot with Moderate Assistance with Grab bars instruction for t/f technique & proper hand placement    Grooming:  Min (A) to comb hair sitting sink side     Bathing:  Patient performed bathing with Moderate Assistance with grab bar, Handheld shower head and tub bench at Shower.    UE Dressing:  Patient performed UE Dressing with Minimal Assistance with verbal instruction for adaptive dressing technique at Wheelchair.    LE Dressing:  Patient don/doffed socks with Stand-by Assistance, Patient don/doffed adult brief with Maximum  Assistance and Patient don/doffed pants with Moderate Assistance >> OTR providing instruction for adaptive dressing techniques and safety    Toilet Training:  Pt performed toileting with Maximum Assistance with grab bar and bedside commode positioned over toilet - pt performing hygiene    Balance:   Static Sit: SBA  Dynamic Sit:    Static Stand: Min (A) with B UE support    Additional Treatment:  - OTR reinforced education/instruction regarding pacemaker precautions, correct positioning of L UE throughout transfers to adhere, correct t/f techniques, safety awareness, adaptive bathing/dressing techniques to increase (I) and safety with tasks. Patient receptive, but will benefit from daily reinforcement     Patient left up in chair with call button in reach, chair alarm on, nurse notified and seated in pt common area watching tv within 10 ft nurses station with multiple staff members present    ASSESSMENT:  Forest Villanueva Jr. is a 71 y.o. male with a medical diagnosis of CVA and presents with improvements toward OT goals as pt performing functional transfers with Mod (A) and bathing/dressing tasks with Min to Max (A). Patient requires instruction/cues to adhere to pacemaker precautions. Patient should continue to benefit from skilled OT services per est POC to increase functional independence, mobility, and safety.    Rehab potential is good    Activity tolerance: Good    Discharge recommendations: home     Equipment recommendations:  (TBD; likely BSC and transfer tub bench)     GOALS:   Occupational Therapy Goals        Problem: Occupational Therapy Goal    Goal Priority Disciplines Outcome Interventions   Occupational Therapy Goal     OT, PT/OT Ongoing (interventions implemented as appropriate)    Description:  Goals to be met by: 3/14/17     Patient will increase functional independence with ADLs by performing:    Feeding with Set-up Assistance.  UE Dressing with Set-up Assistance.  LE Dressing with Set-up  Assistance.  Grooming while seated with Set-up Assistance.  Toileting from toilet with Set-up Assistance for hygiene and clothing management.   Bathing from  shower chair/bench with Set-up Assistance.  Shower transfer with Supervision.  Toilet transfer to toilet with Supervision.                Plan:  Patient to be seen 6 x/week to address the above listed problems via self-care/home management, community/work re-entry, therapeutic activities, therapeutic exercises, therapeutic groups, neuromuscular re-education, cognitive retraining, sensory integration, wheelchair management/training  Plan of Care expires: 03/14/17  Plan of Care reviewed with: patient         Roseann GERRY Murphy, LOTR  02/16/2017

## 2017-02-16 NOTE — PROGRESS NOTES
Progress Note  Physical Medicine & Rehab    Admit Date: 2/14/2017   LOS: 2 days     SUBJECTIVE:     Follow-up For:  Daily round     Review of Systems:  denies cp, sob or abdominal discomfort     OBJECTIVE:     Vital Signs (Most Recent)  Temp: 98.4 °F (36.9 °C) (02/16/17 0525)  Pulse: (!) 52 (02/16/17 0525)  Resp: 18 (02/16/17 0525)  BP: 125/88 (02/16/17 0525)  SpO2: 100 % (02/16/17 0525)    Vital Signs Range (Last 24H):  Temp:  [98.2 °F (36.8 °C)-98.6 °F (37 °C)]   Pulse:  [52-64]   Resp:  [18]   BP: (110-130)/(51-91)   SpO2:  [94 %-100 %]     I & O (Last 24H):  Intake/Output Summary (Last 24 hours) at 02/16/17 0830  Last data filed at 02/16/17 0500   Gross per 24 hour   Intake              700 ml   Output                0 ml   Net              700 ml     Physical Exam:  Calm, cooperative in no distress  Lung: CTA B  Heart: rrr no m  Abd: soft, NTND + BS  Ext: no edema  Skin: intact     Laboratory:  Recent Results (from the past 72 hour(s))   Urinalysis    Collection Time: 02/15/17  6:38 AM   Result Value Ref Range    Specimen UA Urine, Clean Catch     Color, UA Yellow Yellow, Straw, Venus    Appearance, UA Clear Clear    pH, UA 7.0 5.0 - 8.0    Specific Gravity, UA 1.015 1.005 - 1.030    Protein, UA Negative Negative    Glucose, UA Negative Negative    Ketones, UA Negative Negative    Bilirubin (UA) Negative Negative    Occult Blood UA Negative Negative    Nitrite, UA Negative Negative    Urobilinogen, UA Negative <2.0 EU/dL    Leukocytes, UA Negative Negative   CBC auto differential    Collection Time: 02/15/17  6:49 AM   Result Value Ref Range    WBC 7.10 3.90 - 12.70 K/uL    RBC 5.88 4.60 - 6.20 M/uL    Hemoglobin 16.4 14.0 - 18.0 g/dL    Hematocrit 49.5 40.0 - 54.0 %    MCV 84 82 - 98 fL    MCH 27.8 27.0 - 31.0 pg    MCHC 33.0 32.0 - 36.0 %    RDW 15.1 (H) 11.5 - 14.5 %    Platelets 201 150 - 350 K/uL    MPV 11.2 9.2 - 12.9 fL    Gran # 3.9 1.8 - 7.7 K/uL    Lymph # 2.0 1.0 - 4.8 K/uL    Mono # 0.9 0.3 - 1.0  K/uL    Eos # 0.1 0.0 - 0.5 K/uL    Baso # 0.00 0.00 - 0.20 K/uL    Gran% 55.9 38.0 - 73.0 %    Lymph% 28.9 18.0 - 48.0 %    Mono% 12.7 4.0 - 15.0 %    Eosinophil% 1.9 0.0 - 8.0 %    Basophil% 0.6 0.0 - 1.9 %    Platelet Estimate Appears normal     Large/Giant Platelets Present     Differential Method Automated    Comprehensive metabolic panel    Collection Time: 02/15/17  6:49 AM   Result Value Ref Range    Sodium 137 136 - 145 mmol/L    Potassium 4.3 3.5 - 5.1 mmol/L    Chloride 106 95 - 110 mmol/L    CO2 20 (L) 23 - 29 mmol/L    Glucose 97 70 - 110 mg/dL    BUN, Bld 19 8 - 23 mg/dL    Creatinine 1.1 0.5 - 1.4 mg/dL    Calcium 9.7 8.7 - 10.5 mg/dL    Total Protein 7.5 6.0 - 8.4 g/dL    Albumin 3.6 3.5 - 5.2 g/dL    Total Bilirubin 0.5 0.1 - 1.0 mg/dL    Alkaline Phosphatase 73 55 - 135 U/L    AST 21 10 - 40 U/L    ALT 16 10 - 44 U/L    Anion Gap 11 8 - 16 mmol/L    eGFR if African American >60 >60 mL/min/1.73 m^2    eGFR if non African American >60 >60 mL/min/1.73 m^2       Diagnostic Results:  no new imaging     ASSESSMENT/PLAN:     Active Hospital Problems    Diagnosis  POA    CVA (cerebral vascular accident) [I63.9]  Yes      Resolved Hospital Problems    Diagnosis Date Resolved POA   No resolved problems to display.       CVA cont PT, OT, ST   Anticoagulation, cont asa & Plavix  DVT prophylaxis, cont sq Lovenox  HTN, vitals noted, cont current meds

## 2017-02-16 NOTE — H&P
PHYSICAL MEDICINE AND REHABILITATION POST ADMISSION EVALUATION    ATTENDING PHYSICIAN:  Aleah Garrido M.D.    PHYSICIAN ADMISSION UPDATE AND COMMENTS REGARDING PREADMISSION SCREENING STATUS:    There are no substantial changes between the patient's preadmission assessment   and the patient's current status.    APPROPRIATENESS FOR ADMISSION TO INPATIENT REHABILITATION FACILITY:  The   patient's condition is sufficiently stable to allow active participation in   intensive inpatient rehabilitation program.  The patient would benefit from   inpatient rehabilitation due to the followin.  Three hours of therapy at least 5 days per week.    PLAN FOR COMMUNITY DISCHARGE:  The patient has good family support.  The patient   is motivated and willing to participate.  The patient is cognitively relatively   intact.  The patient has good premorbid functional status and there is a need   for interdisciplinary inpatient rehabilitation provided by a physician with   rehab focused nursing and need for PT, OT and speech.    REHABILITATION DIAGNOSIS:  Acute CVA, right edwardo.    IMPAIRMENTS AND DISABILITIES:  Inability to manage self ADLs, inability to   ambulate safely.    REHABILITATION PRECAUTIONS AND RESTRICTIONS:  Fall.    POSSIBLE BARRIERS TO PROGRESS AND DISCHARGE:  The patient's progress may be   impaired by the followin.  Hypertension.  2.  Coronary artery disease.  3.  Hemiparesis.    ACTIVE PROBLEM LIST AND POTENTIAL COMPLICATION FROM THE PATIENT'S MEDICAL   CONDITION AND PLAN TO PREVENT AND ADDRESS THESE.  1.  Fall.  2.  DVT.  3.  Pressure ulcer; however, likely above will be decreased as the patient will   be engaged in therapeutic activities.    This patient's medical complexity requires close physician supervision and   24-hour rehabilitation nursing care to address the etiologic diagnosis, which is   further complicated by following comorbidities:  1.  Hypertension.  2.  Coronary artery disease.  3.  Gait  disorder as a result of hemiparesis.    FUNCTIONAL GOALS TO BE ACHIEVED:  Modified independent.    The patient requires intensive inpatient rehabilitation with care and treatment   by following disciplines:  1.  Medical supervision.  2.  A 24-hour rehabilitation nursing.  3.  Physical therapy.  4.  Occupational therapy.  5.  Speech therapy.    PLAN OF CARE:  The interdisciplinary team will work collaboratively to address   the patient's problem as identified on the individualized interdisciplinary plan   of care.  The plan of care will be initiated and reviewed on a regular basis to   ensure that all appropriate concerns are being addressed throughout the entire   rehab stay.    The patient's expected level of improvement with inpatient rehabilitation   admission is good.    ANTICIPATED DESTINATION POST-DISCHARGE FROM INPATIENT REHABILITATION:  Home with   family.    INTERDISCIPLINARY CARE PLAN:  Reviewed and there are no changes indicated at   this time.    ESTIMATED LENGTH OF STAY:  Approximately 2 to 3 weeks.    MEDICAL REHABILITATION AND PROGNOSIS:  Good.    ARE THE ESTABLISHED GOALS SUFFICIENT FOR ACHIEVING THE OPTIMUM LEVEL OF   FUNCTION:  Yes.    ARE THE INTERVENTIONS NOTED ALONG WITH MEDICAL INTERVENTION AS DOCUMENTED IN THE   HISTORY AND PHYSICAL SUFFICIENT FOR ACHIEVING THE OPTIMUM LEVEL OF FUNCTION:    Yes.    THERAPY PLAN:  Physical therapy b.i.d. for 5 days and everyday for Saturday and   Sunday.  Occupational therapy b.i.d. for 5 days and every day for Saturday and   Sunday.  Speech pathology every day for 5 days.    ANTICIPATED DISCHARGE DESTINATION:  Home with family.      YEIMI  dd: 02/15/2017 20:14:12 (CST)  td: 02/16/2017 00:41:27 (CST)  Doc ID   #[id number]  Job ID #[id number]    CC:

## 2017-02-16 NOTE — H&P
"DATE OF ADMISSION:  02/14/2017    ATTENDING PHYSICIAN:  Aleah Garrido M.D.    REFERRING FACILITY:  [facility] in [place], [place].    CHIEF COMPLAINT:  "Unable to take care of myself."    HISTORY OF PRESENT ILLNESS:  Mr. Vilalnueva is a pleasant 71-year-old gentleman   with history of CVA in 2014 and apparent noncompliant with his blood thinner as   well as blood pressure medication, most recently on 02/07/2017, it was noted by   a friend that he had experience of right-sided weakness; however, the patient   was not brought to ER till 02/08/2017 for further evaluation, at that time was   out of the TPA window.  The patient with diagnoses of acute CVA and right-sided   weakness and dysarthria.  As the patient's acute medical issues was managed, the   patient's medical record was reviewed by Dr. Perez and recommended intensive   inhouse rehabilitation prior to considering discharge to home.    PAST MEDICAL HISTORY:  Significant for CVA 8 years ago and hypertension.    PAST SURGICAL HISTORY:  Significant for pacemaker, CABG, glaucoma and coronary   artery disease.    ALLERGIES:  No known drug allergies.    MEDICATIONS:  Aspirin 81 mg 1 p.o. daily, Plavix 75 mg 1 p.o. daily, docusate   100 mg 1 p.o. daily, Lovenox 40 mg subQ daily, lisinopril 2.5 mg 1 p.o. daily   and metoprolol 25 mg 1 p.o. daily.    LABORATORIES:  On 02/15/2017; electrolytes include sodium of 137, potassium 4.3,   chloride 106, CO2 of 20, glucose 97, BUN 19, creatinine 1.1, calcium 9.7,   albumin 3.6, alkaline phosphatase is 73, AST 21, ALT 16.  On 02/15/2017; CBC   includes white blood cells 7, hemoglobin 16.4, hematocrit 49.5 and platelets of   201.  On 02/15/2017; UA is negative.    PHYSICAL EXAMINATION:  VITAL SIGNS:  Temperature is 98.2, pulse is 64, respirations 18, systolic blood   pressure is 110, diastolic blood pressure is 51, and pulse ox 96%.  GENERAL:  Calm, cooperative, in no acute distress.  HEENT:  " Normocephalic, atraumatic.  Extraocular muscles are intact.  Sclerae is   nonicteric.  NECK:  Supple.  Throat is clear.  LUNGS:  Clear to auscultation bilateral.  HEART:  Regular rate and rhythm, no gallops noted.  ABDOMEN:  Soft, nontender, positive bowel sounds.  EXTREMITIES:  No clubbing, cyanosis or edema is noted.  MANUAL MUSCLE STRENGTH:  Left upper extremity is 4/5.  Left lower extremity is   3+ to 4-/5.  Right upper extremity is 3+/5 and right lower extremity is also   3+/5.  GAIT:  Deferred.  GENITAL:  Deferred.  RECTAL:  Deferred.    ASSESSMENT:  1.  CVA, right edwardo.  2.  Hypertension.  3.  History of coronary artery disease and coronary artery bypass graft.  4.  History of pacemaker.    PLAN:  PT, OT, speech eval, treat as indicated.  Social Service to arrange for   post-discharge planning.  Rehab to nursing with emphasis on bowel and bladder   management, skin care and fall precautions.    PROGNOSIS:  Good.    ESTIMATED LENGTH OF STAY:  Approximately 2 to 3 weeks.      YEIMI  dd: 02/15/2017 20:09:12 (CST)  td: 02/16/2017 01:16:32 (CST)  Doc ID   #[id number]  Job ID #[id number]    CC:

## 2017-02-16 NOTE — PLAN OF CARE
Problem: SLP Goal  Goal: SLP Goal  Short Term Goals modified 2/17/2017:  1. Patient will state compensatory strategies for oral clearance/safe swallow strategies and demonstrate use of strategies MIN A  2. Patient will tolerate Level II Dysphagia DIet (finely chopped meats) with NECTAR-thickened liquids w/o overt S/S aspiration, MIN A, to improve swallow  3. Patient will complete OMEs x20 ea, MIN A, to improve R lingual/labial coordination and strength  4. Patient will attend to items on R side of tray with MIN A for demonstrated use of compensatory strategies for visiospatial skills  5. Patient will name 8 - 10 items/minute in a concrete category, MIN A, to improve word-finding and response time   6. Patient will demo clear speech strategies at the conversational level 90% of time or more, MIN A, to improve speech  7. Patient will recall 3/3 related items for imm, and post 3 minute filled delay, MIN A, to improve memory    Long Term Goals:  1. Patient will tolerate least restrictive diet without S/S aspiration, and good oral clearance, Supervision  2. Educate Patient and family on POC and compensatory strategies for safe swallow   3. Patient will use functional memory strategies to recall events of the day and safely complete ADLs, with Supervision   Outcome: Ongoing (interventions implemented as appropriate)  ST Speech, Language and Cognitive Evaluation completed. Patient with Moderate Dysarthria, Cognitive-Linguistic Impairment and Oropharyngeal Dysphagia. Patient with a score of 8/30 on initial Husam Cognitive Assessment (MoCA,) indicating moderate difficulty on serial subtraction, attention, language and STM recall tasks. Please note, writing tasks held 2/2 decreased RUE mobility. Patient with moderately impaired speech characterized by low volume, slow rate and decreased diadochokinetic rates. He completed informal assessment of reading comprehension today. He demonstrates mildly decreased reading  comprehension at the paragraph level.  Visiospatial deficits noted to impact reading comprehension and safety.  Delayed initiation and prolonged response time noted t/o assessment. Patient with flat affect and appears withdrawn.  SLP to update POC to address deficits.  FIMS at time of assessment are as follow: Comprehension: 3, Expression: 3, Social Interaction:5, Problem Solving: 3, Memory: 3. Thank you, MILE Lew., CCC-SLP

## 2017-02-17 PROCEDURE — 63600175 PHARM REV CODE 636 W HCPCS: Performed by: PHYSICAL MEDICINE & REHABILITATION

## 2017-02-17 PROCEDURE — 25000003 PHARM REV CODE 250: Performed by: PHYSICAL MEDICINE & REHABILITATION

## 2017-02-17 PROCEDURE — 12800000 HC REHAB SEMI-PRIVATE ROOM

## 2017-02-17 PROCEDURE — 63700000 PHARM REV CODE 250 ALT 637 W/O HCPCS: Performed by: PHYSICAL MEDICINE & REHABILITATION

## 2017-02-17 PROCEDURE — 92507 TX SP LANG VOICE COMM INDIV: CPT

## 2017-02-17 PROCEDURE — 97530 THERAPEUTIC ACTIVITIES: CPT

## 2017-02-17 PROCEDURE — 97535 SELF CARE MNGMENT TRAINING: CPT

## 2017-02-17 PROCEDURE — 97110 THERAPEUTIC EXERCISES: CPT

## 2017-02-17 PROCEDURE — 97116 GAIT TRAINING THERAPY: CPT

## 2017-02-17 RX ADMIN — BRIMONIDINE TARTRATE 1 DROP: 1 SOLUTION/ DROPS OPHTHALMIC at 09:02

## 2017-02-17 RX ADMIN — LATANOPROST 1 DROP: 50 SOLUTION OPHTHALMIC at 09:02

## 2017-02-17 RX ADMIN — LISINOPRIL 2.5 MG: 2.5 TABLET ORAL at 09:02

## 2017-02-17 RX ADMIN — METOPROLOL TARTRATE 25 MG: 25 TABLET ORAL at 09:02

## 2017-02-17 RX ADMIN — DORZOLAMIDE HYDROCHLORIDE AND TIMOLOL MALEATE 1 DROP: 20; 5 SOLUTION/ DROPS OPHTHALMIC at 09:02

## 2017-02-17 RX ADMIN — CLOPIDOGREL BISULFATE 75 MG: 75 TABLET ORAL at 09:02

## 2017-02-17 RX ADMIN — DOCUSATE SODIUM 100 MG: 100 CAPSULE, LIQUID FILLED ORAL at 09:02

## 2017-02-17 RX ADMIN — ASPIRIN 81 MG: 81 TABLET, COATED ORAL at 09:02

## 2017-02-17 RX ADMIN — ENOXAPARIN SODIUM 40 MG: 100 INJECTION SUBCUTANEOUS at 11:02

## 2017-02-17 NOTE — PT/OT/SLP PROGRESS
Occupational Therapy  Treatment    Forest Villanueva Jr.   MRN: [id number]        OT Date of Treatment: 17   Total Time (min): 75 min      Billable Minutes:  Self Care/Home Management 75  Total Minutes: 75    General Precautions: Standard, fall, pacemaker      Do you have any cultural, spiritual, Advent conflicts, given your current situation?: Scientologist    Subjective:  Communicated with nurse prior to session.    Pain Ratin/10      Objective:      Functional Mobility:  Bed Mobility:   Supine to sit: Moderate Assistance   Sit to supine: Minimal Assistance              Grooming:  Patient performed face washing with standby assistance.  Patient performed oral hygiene with standby assistance.  Patient performed hair grooming with standby assistance.      LE Dressing:  Patient don/doffed socks with min assistance.  Patient don/doffed shoes with max assistance.       Balance:   Static Sit: FAIR: Maintains without assist, but unable to take any challenges   Dynamic Sit:  FAIR: Cannot move trunk without losing balance                Patient left supine with call button in reach and bed alarm on    ASSESSMENT:  Forest Villanueva Jr. is a 71 y.o. male with a medical diagnosis of <principal problem not specified> and presents with self care skills deficits and transfer skills deficits.    Rehab potential is good    Activity tolerance: good          GOALS:   Occupational Therapy Goals        Problem: Occupational Therapy Goal    Goal Priority Disciplines Outcome Interventions   Occupational Therapy Goal     OT, PT/OT Ongoing (interventions implemented as appropriate)    Description:  Goals to be met by: 3/14/17     Patient will increase functional independence with ADLs by performing:    Feeding with Set-up Assistance.  UE Dressing with Set-up Assistance.  LE Dressing with Set-up Assistance.  Grooming while seated with Set-up Assistance.  Toileting from toilet with Set-up Assistance for hygiene and clothing management.    Bathing from  shower chair/bench with Set-up Assistance.  Shower transfer with Supervision.  Toilet transfer to toilet with Supervision.                Plan:  Patient to be seen 6 x/week to address the above listed problems via self-care/home management, community/work re-entry, therapeutic activities, therapeutic exercises, therapeutic groups, neuromuscular re-education, cognitive retraining, sensory integration, wheelchair management/training  Plan of Care expires: 03/14/17  Plan of Care reviewed with: patient      MORGAN Dyson    2/17/2017

## 2017-02-17 NOTE — PT/OT/SLP PROGRESS
Speech Language Pathology Treatment          Forest Villanueva Jr.   MRN: [id number]     Diet recommendations: Solid Diet Level: Finely chopped meat  Liquid Diet Level: Munjor Thick  SLP Treatment Date: 02/17/17  Speech Start Time: 0836     Speech Stop Time: 0906     Speech Total (min): 30 min       TREATMENT BILLABLE MINUTES:  Speech Therapy Individual 30    General Precautions: Standard, fall, pacemaker          Subjective:  Patient alert, cooperative; up in w/c for tx.  Difficulty following functional commands for some treatment tasks.    Objective:   1. Patient will complete OMEs x20 ea, MIN A, to improve R lingual/labial coordination and strength  2. Patient will name 8 - 10 items/minute in a concrete category, MIN A, to improve word-finding and response time     Assessment:  Forest Villanueva Jr. is a 71 y.o. male with a SLP diagnosis of Dysphagia, Dysarthria and Cognitive-Linguistic Impairment.  Patient demonstrated adequate lingual/labial strength/ROM during OMEX today.  Wet vocal quality throughout session with decreased sustained phonation ability.  Unable to view soft palate for assessment of strength/ROM.  Patient named an average of 7 items in a concrete category given MOD assist.      Discharge recommendations: Discharge Facility/Level Of Care Needs: home     Goals:   SLP Goals        Problem: SLP Goal    Goal Priority Disciplines Outcome   SLP Goal     SLP Ongoing (interventions implemented as appropriate)   Description:  Short Term Goals modified 2/17/2017:  1. Patient will state compensatory strategies for oral clearance/safe swallow strategies and demonstrate use of strategies MIN A  2. Patient will tolerate Level II Dysphagia DIet (finely chopped meats) with NECTAR-thickened liquids w/o overt S/S aspiration, MIN A, to improve swallow  3. Patient will complete OMEs x20 ea, MIN A, to improve R lingual/labial coordination and strength  4. Patient will attend to items on R side of tray with MIN A for  demonstrated use of compensatory strategies for visiospatial skills  5. Patient will name 8 - 10 items/minute in a concrete category, MIN A, to improve word-finding and response time   6. Patient will demo clear speech strategies at the conversational level 90% of time or more, MIN A, to improve speech  7. Patient will recall 3/3 related items for imm, and post 3 minute filled delay, MIN A, to improve memory    Long Term Goals:  1. Patient will tolerate least restrictive diet without S/S aspiration, and good oral clearance, Supervision  2. Educate Patient and family on POC and compensatory strategies for safe swallow   3. Patient will use functional memory strategies to recall events of the day and safely complete ADLs, with Supervision                   Plan:   Patient to be seen Therapy Frequency: 5 x/week   Plan of Care Expires:  (tbd)  Plan of Care reviewed with: patient  SLP Follow-up?: Yes  SLP - Next Visit Date: 02/18/17           Archana Olea CCC-SLP 2/17/2017

## 2017-02-17 NOTE — PLAN OF CARE
Problem: Patient Care Overview  Goal: Plan of Care Review  Outcome: Ongoing (interventions implemented as appropriate)  Rested well, pain controlled, safety maintained.

## 2017-02-17 NOTE — PLAN OF CARE
Problem: SLP Goal  Goal: SLP Goal  Short Term Goals modified 2/17/2017:  1. Patient will state compensatory strategies for oral clearance/safe swallow strategies and demonstrate use of strategies MIN A  2. Patient will tolerate Level II Dysphagia DIet (finely chopped meats) with NECTAR-thickened liquids w/o overt S/S aspiration, MIN A, to improve swallow  3. Patient will complete OMEs x20 ea, MIN A, to improve R lingual/labial coordination and strength  4. Patient will attend to items on R side of tray with MIN A for demonstrated use of compensatory strategies for visiospatial skills  5. Patient will name 8 - 10 items/minute in a concrete category, MIN A, to improve word-finding and response time   6. Patient will demo clear speech strategies at the conversational level 90% of time or more, MIN A, to improve speech  7. Patient will recall 3/3 related items for imm, and post 3 minute filled delay, MIN A, to improve memory    Long Term Goals:  1. Patient will tolerate least restrictive diet without S/S aspiration, and good oral clearance, Supervision  2. Educate Patient and family on POC and compensatory strategies for safe swallow   3. Patient will use functional memory strategies to recall events of the day and safely complete ADLs, with Supervision   Outcome: Ongoing (interventions implemented as appropriate)  Patient demonstrated adequate lingual/labial strength/ROM during OMEX today.  Wet vocal quality throughout session with decreased sustained phonation ability.  Unable to view soft palate for assessment of strength/ROM.  Patient named an average of 7 items in a concrete category given MOD assist.

## 2017-02-17 NOTE — PROGRESS NOTES
Progress Note  Physical Medicine & Rehab    Admit Date: 2/14/2017   LOS: 3 days     SUBJECTIVE:     Follow-up For:  Daily round     Review of Systems:  denies cp, sob or  abdominal discomfort     OBJECTIVE:     Vital Signs (Most Recent)  Temp: 98.4 °F (36.9 °C) (02/17/17 0500)  Pulse: (!) 51 (02/17/17 0500)  Resp: 20 (02/17/17 0500)  BP: (!) 147/88 (02/17/17 0500)  SpO2: 99 % (02/17/17 0500)    Vital Signs Range (Last 24H):  Temp:  [97.6 °F (36.4 °C)-98.5 °F (36.9 °C)]   Pulse:  [51-59]   Resp:  [16-20]   BP: (123-147)/(74-92)   SpO2:  [97 %-100 %]     I & O (Last 24H):  Intake/Output Summary (Last 24 hours) at 02/17/17 0817  Last data filed at 02/16/17 2000   Gross per 24 hour   Intake              640 ml   Output                0 ml   Net              640 ml     Physical Exam:  Calm, cooperative in no distress  Lung: CTA B  Heart: rrr no m  Abd: soft, NTND + BS  Ext: no edema  Skin: intact     Laboratory:  Recent Results (from the past 72 hour(s))   Urinalysis    Collection Time: 02/15/17  6:38 AM   Result Value Ref Range    Specimen UA Urine, Clean Catch     Color, UA Yellow Yellow, Straw, Venus    Appearance, UA Clear Clear    pH, UA 7.0 5.0 - 8.0    Specific Gravity, UA 1.015 1.005 - 1.030    Protein, UA Negative Negative    Glucose, UA Negative Negative    Ketones, UA Negative Negative    Bilirubin (UA) Negative Negative    Occult Blood UA Negative Negative    Nitrite, UA Negative Negative    Urobilinogen, UA Negative <2.0 EU/dL    Leukocytes, UA Negative Negative   Urine culture    Collection Time: 02/15/17  6:39 AM   Result Value Ref Range    Urine Culture, Routine No significant growth    CBC auto differential    Collection Time: 02/15/17  6:49 AM   Result Value Ref Range    WBC 7.10 3.90 - 12.70 K/uL    RBC 5.88 4.60 - 6.20 M/uL    Hemoglobin 16.4 14.0 - 18.0 g/dL    Hematocrit 49.5 40.0 - 54.0 %    MCV 84 82 - 98 fL    MCH 27.8 27.0 - 31.0 pg    MCHC 33.0 32.0 - 36.0 %    RDW 15.1 (H) 11.5 - 14.5 %     Platelets 201 150 - 350 K/uL    MPV 11.2 9.2 - 12.9 fL    Gran # 3.9 1.8 - 7.7 K/uL    Lymph # 2.0 1.0 - 4.8 K/uL    Mono # 0.9 0.3 - 1.0 K/uL    Eos # 0.1 0.0 - 0.5 K/uL    Baso # 0.00 0.00 - 0.20 K/uL    Gran% 55.9 38.0 - 73.0 %    Lymph% 28.9 18.0 - 48.0 %    Mono% 12.7 4.0 - 15.0 %    Eosinophil% 1.9 0.0 - 8.0 %    Basophil% 0.6 0.0 - 1.9 %    Platelet Estimate Appears normal     Large/Giant Platelets Present     Differential Method Automated    Comprehensive metabolic panel    Collection Time: 02/15/17  6:49 AM   Result Value Ref Range    Sodium 137 136 - 145 mmol/L    Potassium 4.3 3.5 - 5.1 mmol/L    Chloride 106 95 - 110 mmol/L    CO2 20 (L) 23 - 29 mmol/L    Glucose 97 70 - 110 mg/dL    BUN, Bld 19 8 - 23 mg/dL    Creatinine 1.1 0.5 - 1.4 mg/dL    Calcium 9.7 8.7 - 10.5 mg/dL    Total Protein 7.5 6.0 - 8.4 g/dL    Albumin 3.6 3.5 - 5.2 g/dL    Total Bilirubin 0.5 0.1 - 1.0 mg/dL    Alkaline Phosphatase 73 55 - 135 U/L    AST 21 10 - 40 U/L    ALT 16 10 - 44 U/L    Anion Gap 11 8 - 16 mmol/L    eGFR if African American >60 >60 mL/min/1.73 m^2    eGFR if non African American >60 >60 mL/min/1.73 m^2       Diagnostic Results:  no new imaging     ASSESSMENT/PLAN:     Active Hospital Problems    Diagnosis  POA    CVA (cerebral vascular accident) [I63.9]  Yes      Resolved Hospital Problems    Diagnosis Date Resolved POA   No resolved problems to display.       CVA cont PT, OT, ST  Anticoagulation, cont asa & Plavix  DVT prophylaxis, cont sq Lovenox  HTN, vitals noted, cont current meds

## 2017-02-17 NOTE — PLAN OF CARE
Problem: Fall Risk (Adult)  Intervention: Reduce Risk/Promote Restraint Free Environment  Instructed to call, not fall. States understanding.

## 2017-02-17 NOTE — PLAN OF CARE
Problem: Patient Care Overview  Goal: Plan of Care Review  Outcome: Ongoing (interventions implemented as appropriate)  Some bladder incontinence, appetite fair, slightly pleasantly confused, no c/o pain, skin OK

## 2017-02-17 NOTE — PT/OT/SLP PROGRESS
Physical Therapy         Treatment        Forest Villanueva Jr.   MRN: [id number]     PT Received On: 17  Total Time (min): 75        Billable Minutes:  Gait [id number], Therapeutic Activity 15 and Therapeutic Exercise 40  Total Minutes: 75 (9:25 - 10:45)    Treatment Type: Treatment  PT/PTA: PT     PTA Visit Number: 0       General Precautions: Standard, fall, pacemaker       Subjective:  Communicated with RN prior to session.    Pain Ratin/10              Pain Rating Post-Intervention: 0/10    Objective:  Patient found seated in w/c.      Functional Mobility:  Bed Mobility:   Supine to sit: Moderate Assistance   Sit to supine: Minimal Assistance     Transfer Training:  Sit to stand:Minimal Assistance with Rolling Walker  x 8  Bed <> Chair:  Stand Pivot with Minimal Assistance with Rolling Walker to/from bed    Wheelchair Trainin'  (slow) with min assist and cues  Gait Trainin' x 1, 60' x 2 (very slow)  with RW with min assist and cues      Additional Treatment:  SUPINE: SLR, ankle DF, hip abd/add, x 30 reps each  SciFit StepOne: L 2.0 x 20 min, LEs only         Activity Tolerance:  Patient tolerated treatment well    Patient left  call button in reach and supine in bed.    Assessment:  Forest Villanueva Jr. is a 71 y.o. male .    Rehab potential is good.    Activity tolerance: Fair    GOALS:   Physical Therapy Goals        Problem: Physical Therapy Goal    Goal Priority Disciplines Outcome Goal Variances Interventions   Physical Therapy Goal     PT/OT, PT      Description:  Goals to be met by: 3/17/2017     Patient will increase functional independence with mobility by performin). Supine to sit with Modified [place]  2). Sit to supine with Modified [place]  3). Sit to stand transfer with Stand-by Assistance  4). Bed to chair transfer with Stand-by Assistance   5). Gait  x > 150 feet with Stand-by Assistance   6). Wheelchair propulsion x > 200 feet with Modified [place]                PLAN:    Patient to be seen daily  to address the above listed problems via gait training, therapeutic activities, therapeutic exercises, neuromuscular re-education, wheelchair management/training  Plan of Care expires: 03/17/17  Plan of Care reviewed with: patient         Christ FELIX Chapo, PT 2/17/2017

## 2017-02-18 PROCEDURE — 25000003 PHARM REV CODE 250: Performed by: PHYSICAL MEDICINE & REHABILITATION

## 2017-02-18 PROCEDURE — 97116 GAIT TRAINING THERAPY: CPT

## 2017-02-18 PROCEDURE — 12800000 HC REHAB SEMI-PRIVATE ROOM

## 2017-02-18 PROCEDURE — 92507 TX SP LANG VOICE COMM INDIV: CPT

## 2017-02-18 PROCEDURE — 97110 THERAPEUTIC EXERCISES: CPT

## 2017-02-18 PROCEDURE — 63600175 PHARM REV CODE 636 W HCPCS: Performed by: PHYSICAL MEDICINE & REHABILITATION

## 2017-02-18 PROCEDURE — 63700000 PHARM REV CODE 250 ALT 637 W/O HCPCS: Performed by: PHYSICAL MEDICINE & REHABILITATION

## 2017-02-18 PROCEDURE — 97530 THERAPEUTIC ACTIVITIES: CPT

## 2017-02-18 PROCEDURE — 92526 ORAL FUNCTION THERAPY: CPT

## 2017-02-18 PROCEDURE — 97535 SELF CARE MNGMENT TRAINING: CPT

## 2017-02-18 RX ADMIN — ASPIRIN 81 MG: 81 TABLET, COATED ORAL at 09:02

## 2017-02-18 RX ADMIN — CLOPIDOGREL BISULFATE 75 MG: 75 TABLET ORAL at 09:02

## 2017-02-18 RX ADMIN — BRIMONIDINE TARTRATE 1 DROP: 1 SOLUTION/ DROPS OPHTHALMIC at 09:02

## 2017-02-18 RX ADMIN — ENOXAPARIN SODIUM 40 MG: 100 INJECTION SUBCUTANEOUS at 11:02

## 2017-02-18 RX ADMIN — DOCUSATE SODIUM 100 MG: 100 CAPSULE, LIQUID FILLED ORAL at 09:02

## 2017-02-18 RX ADMIN — DORZOLAMIDE HYDROCHLORIDE AND TIMOLOL MALEATE 1 DROP: 20; 5 SOLUTION/ DROPS OPHTHALMIC at 09:02

## 2017-02-18 RX ADMIN — LATANOPROST 1 DROP: 50 SOLUTION OPHTHALMIC at 09:02

## 2017-02-18 RX ADMIN — METOPROLOL TARTRATE 25 MG: 25 TABLET ORAL at 09:02

## 2017-02-18 NOTE — PROGRESS NOTES
Progress Note  Physical Medicine & Rehab    Admit Date: 2/14/2017   LOS: 4 days     SUBJECTIVE:     Follow-up For:  Daily round     Review of Systems:  denies cp, sob or abdominal discomfort     OBJECTIVE:     Vital Signs (Most Recent)  Temp: 97.8 °F (36.6 °C) (02/18/17 0556)  Pulse: (!) 51 (02/18/17 0900)  Resp: 18 (02/18/17 0556)  BP: 117/72 (02/18/17 0900)  SpO2: 96 % (02/18/17 0556)    Vital Signs Range (Last 24H):  Temp:  [97.8 °F (36.6 °C)-98.8 °F (37.1 °C)]   Pulse:  [50-59]   Resp:  [18]   BP: (117-125)/(72-80)   SpO2:  [95 %-96 %]     I & O (Last 24H):  Intake/Output Summary (Last 24 hours) at 02/18/17 1012  Last data filed at 02/17/17 1700   Gross per 24 hour   Intake              720 ml   Output                0 ml   Net              720 ml     Physical Exam:  Calm, cooperative in no distress  Lung: CTA B  Heart: rrr no m  Abd: soft, NTND + BS  Ext: no edema  Skin: intact     Laboratory:  No results found for this or any previous visit (from the past 72 hour(s)).    Diagnostic Results:  no new imaging     ASSESSMENT/PLAN:     Active Hospital Problems    Diagnosis  POA    CVA (cerebral vascular accident) [I63.9]  Yes      Resolved Hospital Problems    Diagnosis Date Resolved POA   No resolved problems to display.       CVA cont PT, OT, ST   Anticoagulation, cont asa & Plavix  DVT prophylaxis, cont sq Lovenox  HTN, vitals noted, cont current meds

## 2017-02-18 NOTE — PLAN OF CARE
Problem: Occupational Therapy Goal  Goal: Occupational Therapy Goal  Goals to be met by: 3/14/17     Patient will increase functional independence with ADLs by performing:    Feeding with Set-up Assistance.  UE Dressing with Set-up Assistance.  LE Dressing with Set-up Assistance.  Grooming while seated with Set-up Assistance.  Toileting from toilet with Set-up Assistance for hygiene and clothing management.   Bathing from shower chair/bench with Set-up Assistance.  Shower transfer with Supervision.  Toilet transfer to toilet with Supervision.   Pt slowly progressing toward goals.

## 2017-02-18 NOTE — PLAN OF CARE
Problem: Physical Therapy Goal  Goal: Physical Therapy Goal  Goals to be met by: 3/17/2017     Patient will increase functional independence with mobility by performin). Supine to sit with Modified [place]  2). Sit to supine with Modified [place]  3). Sit to stand transfer with Stand-by Assistance  4). Bed to chair transfer with Stand-by Assistance   5). Gait x > 150 feet with Stand-by Assistance   6). Wheelchair propulsion x > 200 feet with Modified [place]   Outcome: Ongoing (interventions implemented as appropriate)  Patient participated in gait training, therapeutic exercise and activities to improve functional mobility, improve ADL's and promote safe ambulation to decrease fall risk.

## 2017-02-18 NOTE — PLAN OF CARE
Problem: SLP Goal  Goal: SLP Goal  Short Term Goals modified 2/17/2017:  1. Patient will state compensatory strategies for oral clearance/safe swallow strategies and demonstrate use of strategies MIN A  2. Patient will tolerate Level II Dysphagia DIet (finely chopped meats) with NECTAR-thickened liquids w/o overt S/S aspiration, MIN A, to improve swallow  3. Patient will complete OMEs x20 ea, MIN A, to improve R lingual/labial coordination and strength  4. Patient will attend to items on R side of tray with MIN A for demonstrated use of compensatory strategies for visiospatial skills  5. Patient will name 8 - 10 items/minute in a concrete category, MIN A, to improve word-finding and response time   6. Patient will demo clear speech strategies at the conversational level 90% of time or more, MIN A, to improve speech  7. Patient will recall 3/3 related items for imm, and post 3 minute filled delay, MIN A, to improve memory    Long Term Goals:  1. Patient will tolerate least restrictive diet without S/S aspiration, and good oral clearance, Supervision  2. Educate Patient and family on POC and compensatory strategies for safe swallow   3. Patient will use functional memory strategies to recall events of the day and safely complete ADLs, with Supervision   Outcome: Ongoing (interventions implemented as appropriate)  1. indep used liquid swish to clear residue, cued to use tongue sweep upper & lower gums.  2. Found with Twinkies & NTL, no oral residue, no s/s pharyngeal dysphagia on sips of NTL  3. OMEx x5, SBA  4. Mod cues to locate objects on far R  5. Consistently named 5 items in concrete category indep  6. Intro to CSS, used in imitation with excellent result, answered ??s given mod cues to increase loudness, increase precision   7. 3/3 x3 sets of words, given Tete to set up

## 2017-02-18 NOTE — PT/OT/SLP PROGRESS
Physical Therapy         Treatment        Forest Villanueva Jr.   MRN: [id number]     PT Received On: 17           Billable Minutes:  15 gate 75 Therapeutic exercise  Total Minutes: 90    Treatment Type: Treatment  PT/PTA: PTA     PTA Visit Number: 1       General Precautions: Standard, fall  Orthopedic Precautions:     Braces:           Subjective:  Communicated with DEMOND Posadas prior to session. Patient agreed to participate with physical therapy.    Pain Ratin/10                   Objective:   Patient found with:  (sitting in WC with chair alarm on) for first 60 minute treatment and in bed with bed alarm for second treatment lasting 30 minutes.      Transfer Training:  Sit to stand:Contact Guard Assistance and Minimal Assistance with Rolling Walker with VC to lock WC and use WC arm and scoot fwd in WC then bend fwd with nose over toes and push up to stand before reaching for RW.    Wheelchair Training:  Pt propelled Standard wheelchair x 250 feet on Level tile with  Bilateral lower extremity with Independent.     Gait Training:  Patient gait trained FWB/WBAT: bilateral lower extremity 72, 62  feet on level tile with Rolling Walker with Stand-by Assistance, Contact Guard Assistance and VC for foot placement and steering of RW.  Pt with demonstarting a  3-point gait with decreased delmar, decreased velocity of limb motion, decreased step length and decreased stride width .Impairments contributing to gait deviations include impaired balance, decreased flexibility, impaired postural control and decreased strength      Additional Treatment:    Seated marching 20 x   LAQ 20 x   In // bars: Hip ab/add, flexion/extension 20 x ea   In // bars: mini squats and calf raises 20 x ea   Nu-Step stepper L-1 x 10 minutes using both UE and LE   Sit to stand from WC as an exercise 1 x 10 using arms of WC and strengthening Quads to improve transgers/     Patient performed 30 minutes of exercises in supine as follows;    Heel  slides with RLE x 30    SLR with RLE x 30    QS, GS, AP x 30 B    Bridges 3 x 10 B    Dead bug 2 x 10      Sat on EOB x 5 min for balance activities:     Lean to R/L/Fwd/Back with no LOB          Activity Tolerance:  Patient tolerated treatment well and Patient limited by fatigue. Patient able to put his legs back into bed after exercise in the afternoon for the second visit and although he still complained of fatigue he did quite well. Patient was left in supine position in bed with alarm on after second treatment visit and RN Sandrita notified.    Patient left up in chair with chair alarm on and RN Cuauhtemoc notified.    Assessment:  Forest Villanueva Jr. is a 71 y.o. male with a medical diagnosis of <principal problem not specified>. He presents with CVA and Right side weakness. He is cooperative and very willing to progress with therapy..    Rehab potential is good.    Activity tolerance: Good    Discharge recommendations:       Equipment recommendations:       GOALS:   Physical Therapy Goals        Problem: Physical Therapy Goal    Goal Priority Disciplines Outcome Goal Variances Interventions   Physical Therapy Goal     PT/OT, PT Ongoing (interventions implemented as appropriate)     Description:  Goals to be met by: 3/17/2017     Patient will increase functional independence with mobility by performin). Supine to sit with Modified [place]  2). Sit to supine with Modified [place]  3). Sit to stand transfer with Stand-by Assistance  4). Bed to chair transfer with Stand-by Assistance   5). Gait  x > 150 feet with Stand-by Assistance   6). Wheelchair propulsion x > 200 feet with Modified [place]               PLAN:    Patient to be seen daily  to address the above listed problems via gait training, therapeutic activities, therapeutic exercises, neuromuscular re-education, wheelchair management/training  Plan of Care expires: 17  Plan of Care reviewed with: patient         Patricia Shafer, PTA  2/18/2017

## 2017-02-18 NOTE — PT/OT/SLP PROGRESS
Occupational Therapy  Treatment    Forest Villanueva Jr.   MRN: [id number]   Admitting Diagnosis: CVA  OT Date of Treatment: 02/18/17   Total Time (min): 60 min      Billable Minutes:  Self Care/Home Management 25, Therapeutic Activity 20 and Therapeutic Exercise 15  Total Minutes: 60    General Precautions: Standard, pacemaker, fall  Orthopedic Precautions: N/A  Braces: N/A    Do you have any cultural, spiritual, Yazidi conflicts, given your current situation?: Religious    Subjective:  Communicated with nsg (Cuauhtemoc) prior to session. Pt reports that he is wet and needs to be changed.     Pain Rating Post-Intervention: 0/10    Objective:   Pt found in bed, alarm on. Incontinent of urine.    Functional Mobility:  Bed Mobility:   Supine to sit: Minimal Assistance     Transfer Training:   Min A EOB->w/c to the left w/ RW; mod cues for hand placement with RW.    Grooming:  Min A to wash hands at sink, w/c level. Assist to get soap and turn off water. Cues and assist for thoroughness as pt not fully rinsing off hands. Cues to get paper towel to dry hands.    LE Dressing:  Sitting EOB, pt doffed pants w/ CGA to stand and max A to unthread feet. He doffed pull-up w/ CGA for stand and min A to unthread feet.  Pt donned pull-up w/ mod A to thread feet and min A for stand during hike.  Pt donned pants w/ mod A to thread feet and min A for stand during hike.    Toilet Training:  Pt incontinent of urine. Max A for clothing management, please see above. Pt did complete perineal hygiene with min A for stand balance and total A for perianal hygiene (simply to ensure cleanliness before donning clean pull-up.    Balance:   Static Sit: FAIR+: Able to take MINIMAL challenges from all directions  Dynamic Sit:  Fair-Fair (+) during LB dressing at EOB    Additional Treatment:  Red flex bar for frowns and smiles x20 each.  Pt reached into tub w the R UE to remove containers and place them ipsilaterally on the mat. Pt then reached  ipsilaterally to retrieve 1 container at a time and open and close it, using L UE to stabilize container. Mod cues to use the R UE as pt initiating L UE of multiple containers. About 15 containers total.    Patient left up in chair with chair alarm on and pt in common area near nurses' station.    ASSESSMENT:  Forest Villanueva Jr. is a 71 y.o. male with a medical diagnosis of CVA and presents with impairments in all activities, decreased balance, decreased coordination and strength, decreased initiation and cognition. He can continue to benefit from multidisciplinary rehab to improve his overall functional level for safe return home with family.    Rehab potential is fair    Activity tolerance: Poor    Discharge recommendations: home     Equipment recommendations:  (TBD; likely 3in1 and TTB)     GOALS:   Occupational Therapy Goals        Problem: Occupational Therapy Goal    Goal Priority Disciplines Outcome Interventions   Occupational Therapy Goal     OT, PT/OT Ongoing (interventions implemented as appropriate)    Description:  Goals to be met by: 3/14/17     Patient will increase functional independence with ADLs by performing:    Feeding with Set-up Assistance.  UE Dressing with Set-up Assistance.  LE Dressing with Set-up Assistance.  Grooming while seated with Set-up Assistance.  Toileting from toilet with Set-up Assistance for hygiene and clothing management.   Bathing from  shower chair/bench with Set-up Assistance.  Shower transfer with Supervision.  Toilet transfer to toilet with Supervision.                Plan:  Patient to be seen 6 x/week to address the above listed problems via self-care/home management, community/work re-entry, therapeutic activities, therapeutic exercises, therapeutic groups, neuromuscular re-education, cognitive retraining, sensory integration, wheelchair management/training  Plan of Care expires: 03/14/17  Plan of Care reviewed with: patient         Nicolle Juarez, OT  02/18/2017

## 2017-02-18 NOTE — PLAN OF CARE
Problem: Fall Risk (Adult)  Intervention: Patient Rounds  Instructed to call, not fall. States understanding.

## 2017-02-18 NOTE — PLAN OF CARE
Problem: Patient Care Overview  Goal: Plan of Care Review  Outcome: Ongoing (interventions implemented as appropriate)  Incontinent of bladder, appetite fair, skin good, no c/o pain, is mildly pleasantly confused.

## 2017-02-18 NOTE — PLAN OF CARE
Problem: Patient Care Overview  Goal: Plan of Care Review  Outcome: Ongoing (interventions implemented as appropriate)  Rested well, pain controlled. Safety maintained.

## 2017-02-18 NOTE — PT/OT/SLP PROGRESS
Speech Language Pathology Treatment          Forest Villanueva Jr.   MRN: [id number]     Diet recommendations: Solid Diet Level: Finely chopped meat  Liquid Diet Level: [place] Thick     SLP Treatment Date: 02/18/17  Speech Start Time: 1554     Speech Stop Time: 1624     Speech Total (min): 30 min       TREATMENT BILLABLE MINUTES:  Speech Therapy Individual 20, Treatment Swallowing Dysfunction 10 and Total Time 30    General Precautions: Standard, fall, pacemaker          Subjective:  Can I have a drink of my juice?    Objective:   Patient found in w/c, alert & cooperative.  Patient found with:  (chair alarm).  See Care Plan for tx details    Assessment:  Forest Villanueva Jr. is a 71 y.o. male with a SLP diagnosis of Dysphagia, Dysarthria and Cognitive-Linguistic Impairment. He presented with good effort & improved oral motor skills, as well as recall of 3 unrelated words after 5 minutes indep..      Discharge recommendations: Discharge Facility/Level Of Care Needs: home     Goals:   SLP Goals        Problem: SLP Goal    Goal Priority Disciplines Outcome   SLP Goal     SLP Ongoing (interventions implemented as appropriate)   Description:  Short Term Goals modified 2/17/2017:  1. Patient will state compensatory strategies for oral clearance/safe swallow strategies and demonstrate use of strategies MIN A  2. Patient will tolerate Level II Dysphagia DIet (finely chopped meats) with NECTAR-thickened liquids w/o overt S/S aspiration, MIN A, to improve swallow  3. Patient will complete OMEs x20 ea, MIN A, to improve R lingual/labial coordination and strength  4. Patient will attend to items on R side of tray with MIN A for demonstrated use of compensatory strategies for visiospatial skills  5. Patient will name 8 - 10 items/minute in a concrete category, MIN A, to improve word-finding and response time   6. Patient will demo clear speech strategies at the conversational level 90% of time or more, MIN A, to improve speech  7.  Patient will recall 3/3 related items for imm, and post 3 minute filled delay, MIN A, to improve memory    Long Term Goals:  1. Patient will tolerate least restrictive diet without S/S aspiration, and good oral clearance, Supervision  2. Educate Patient and family on POC and compensatory strategies for safe swallow   3. Patient will use functional memory strategies to recall events of the day and safely complete ADLs, with Supervision                   Plan:   Patient to be seen Therapy Frequency: 5 x/week   Plan of Care Expires:  (tbd)  Plan of Care reviewed with: patient  SLP Follow-up?: Yes  SLP - Next Visit Date: 02/20/17           Aby Perez CCC-SLP/A 2/18/2017

## 2017-02-19 PROCEDURE — 63700000 PHARM REV CODE 250 ALT 637 W/O HCPCS: Performed by: PHYSICAL MEDICINE & REHABILITATION

## 2017-02-19 PROCEDURE — 92526 ORAL FUNCTION THERAPY: CPT

## 2017-02-19 PROCEDURE — 25000003 PHARM REV CODE 250: Performed by: PHYSICAL MEDICINE & REHABILITATION

## 2017-02-19 PROCEDURE — 97110 THERAPEUTIC EXERCISES: CPT

## 2017-02-19 PROCEDURE — 12800000 HC REHAB SEMI-PRIVATE ROOM

## 2017-02-19 PROCEDURE — 92507 TX SP LANG VOICE COMM INDIV: CPT

## 2017-02-19 PROCEDURE — 63600175 PHARM REV CODE 636 W HCPCS: Performed by: PHYSICAL MEDICINE & REHABILITATION

## 2017-02-19 PROCEDURE — 97535 SELF CARE MNGMENT TRAINING: CPT

## 2017-02-19 PROCEDURE — 97116 GAIT TRAINING THERAPY: CPT

## 2017-02-19 PROCEDURE — 97112 NEUROMUSCULAR REEDUCATION: CPT

## 2017-02-19 PROCEDURE — 97532 *HC SP COG SKL DEV EA 15 MIN: CPT

## 2017-02-19 RX ADMIN — DORZOLAMIDE HYDROCHLORIDE AND TIMOLOL MALEATE 1 DROP: 20; 5 SOLUTION/ DROPS OPHTHALMIC at 09:02

## 2017-02-19 RX ADMIN — DOCUSATE SODIUM 100 MG: 100 CAPSULE, LIQUID FILLED ORAL at 09:02

## 2017-02-19 RX ADMIN — BRIMONIDINE TARTRATE 1 DROP: 1 SOLUTION/ DROPS OPHTHALMIC at 09:02

## 2017-02-19 RX ADMIN — ENOXAPARIN SODIUM 40 MG: 100 INJECTION SUBCUTANEOUS at 12:02

## 2017-02-19 RX ADMIN — LATANOPROST 1 DROP: 50 SOLUTION OPHTHALMIC at 09:02

## 2017-02-19 RX ADMIN — METOPROLOL TARTRATE 25 MG: 25 TABLET ORAL at 09:02

## 2017-02-19 RX ADMIN — ASPIRIN 81 MG: 81 TABLET, COATED ORAL at 09:02

## 2017-02-19 RX ADMIN — CLOPIDOGREL BISULFATE 75 MG: 75 TABLET ORAL at 09:02

## 2017-02-19 NOTE — PT/OT/SLP PROGRESS
Occupational Therapy  Treatment    Forest Villanueva Jr.   MRN: [id number]   Admitting Diagnosis: Left CVA with Right edwardo  OT Date of Treatment: 17   Total Time (min): 60 min      Billable Minutes:  Self Care/Home Management 30 and Neuromuscular Re-education 30  Total Minutes: 60    General Precautions: Standard, fall, pacemaker  Orthopedic Precautions: N/A  Braces: N/A    Do you have any cultural, spiritual, Mormonism conflicts, given your current situation?: Lutheran    Subjective:  Communicated with nurse and PT prior to session.    Pain Ratin/10              Pain Rating Post-Intervention: 0/10    Objective:  Patient found with:  (In his room with nurses present, having responded to his chair alarm going off.  Pt was requesting to get into the bed.  He was agreeable to  OT tx session.)    Functional Mobility:  Grooming:  In standing, pt performed brushing of hair with LUE and self stabilization with RUE on the counter top. He completed brushing of teeth and rinsing with mouth wash with CGA/Tete in standing with instructions to not gargle and only rinse due to nectar thick restrictions.  He washed hands and face in standing with CGA/Tete for standing balance and to assist right hand to attain soap.      Balance:   Static Sit: Supervision/Mireya  Static Stand: CGA/Tete  Dynamic stand: CGA/Tete due to loss of balance secondary to weakness in right knee.    Additional Treatment:  Pt engaged in performance of bilateral melissa 3sets/10 reps/0# resistance for gravity eliminated protraction/retraction with elbow flexion/extension.  He performed right hand fine motor tasks of thumb opposition to 2 through 5 digits in pinching, placing and releasing small objects.  He completed 2 sets/5 reps of gross hand resistive flexion with minimal resistive hand therapy ball.  He required Tete verbal instructions to correctly thread cording through matching size openings moving right to left using tip and tripod pinch.    Patient  left up in chair with chair alarm on and ST present to take pt for tx session.    ASSESSMENT:  Forest Villanueva Jr. was able to stand for grooming for periods of 3.5 and 4.20 minutes.  He reported LE fatigue , specifically right knee weakness at end of time periods.  He was cooperative in all task.  He only interacted verbally when questions were directed to him.  He reports he lives a sedentary life style.    Rehab potential is good    Activity tolerance: Good    Discharge recommendations: home     Equipment recommendations:  (TBD)     GOALS:   Occupational Therapy Goals        Problem: Occupational Therapy Goal    Goal Priority Disciplines Outcome Interventions   Occupational Therapy Goal     OT, PT/OT Ongoing (interventions implemented as appropriate)    Description:  Goals to be met by: 3/14/17     Patient will increase functional independence with ADLs by performing:    Feeding with Set-up Assistance.  UE Dressing with Set-up Assistance.  LE Dressing with Set-up Assistance.  Grooming while seated with Set-up Assistance.  Toileting from toilet with Set-up Assistance for hygiene and clothing management.   Bathing from  shower chair/bench with Set-up Assistance.  Shower transfer with Supervision.  Toilet transfer to toilet with Supervision.                Plan:  Patient to be seen 6 x/week to address the above listed problems via self-care/home management, community/work re-entry, therapeutic activities, therapeutic groups, therapeutic exercises, neuromuscular re-education, cognitive retraining, sensory integration, wheelchair management/training  Plan of Care expires: 03/14/17  Plan of Care reviewed with: patient         Wendy Cedric, OT  02/19/2017

## 2017-02-19 NOTE — PROGRESS NOTES
Progress Note  Physical Medicine & Rehab    Admit Date: 2/14/2017   LOS: 5 days     SUBJECTIVE:     Follow-up For:  Daily round     Review of Systems:  denies cp, sob or abdominal discomfort     OBJECTIVE:     Vital Signs (Most Recent)  Temp: 98.6 °F (37 °C) (02/19/17 0538)  Pulse: (!) 58 (02/19/17 0538)  Resp: 16 (02/19/17 0538)  BP: 139/84 (02/19/17 0538)  SpO2: 97 % (02/19/17 0538)    Vital Signs Range (Last 24H):  Temp:  [98.1 °F (36.7 °C)-98.6 °F (37 °C)]   Pulse:  [51-58]   Resp:  [16-18]   BP: (117-139)/(72-84)   SpO2:  [97 %-99 %]     I & O (Last 24H):  Intake/Output Summary (Last 24 hours) at 02/19/17 0754  Last data filed at 02/19/17 0500   Gross per 24 hour   Intake             1320 ml   Output              100 ml   Net             1220 ml     Physical Exam:  Calm, cooperative in no distress  Lung: CTA B  Heart: rrr no m  Abd: soft, NTND + BS  Ext: no edema  Skin: intact     Laboratory:  No results found for this or any previous visit (from the past 72 hour(s)).    Diagnostic Results:  no new imaging     ASSESSMENT/PLAN:     Active Hospital Problems    Diagnosis  POA    CVA (cerebral vascular accident) [I63.9]  Yes      Resolved Hospital Problems    Diagnosis Date Resolved POA   No resolved problems to display.       CVA cont PT, OT, ST  Anticoagulation, cont asa & Plavix  DVT prophylaxis, cont sq Lovenox  HTN, vitals noted, cont current meds

## 2017-02-19 NOTE — PLAN OF CARE
Problem: SLP Goal  Goal: SLP Goal  Short Term Goals modified 2/17/2017:  1. Patient will state compensatory strategies for oral clearance/safe swallow strategies and demonstrate use of strategies MIN A  2. Patient will tolerate Level II Dysphagia DIet (finely chopped meats) with NECTAR-thickened liquids w/o overt S/S aspiration, MIN A, to improve swallow  3. Patient will complete OMEs x20 ea, MIN A, to improve R lingual/labial coordination and strength  4. Patient will attend to items on R side of tray with MIN A for demonstrated use of compensatory strategies for visiospatial skills  5. Patient will name 8 - 10 items/minute in a concrete category, MIN A, to improve word-finding and response time   6. Patient will demo clear speech strategies at the conversational level 90% of time or more, MIN A, to improve speech  7. Patient will recall 3/3 related items for imm, and post 3 minute filled delay, MIN A, to improve memory    Long Term Goals:  1. Patient will tolerate least restrictive diet without S/S aspiration, and good oral clearance, Supervision  2. Educate Patient and family on POC and compensatory strategies for safe swallow   3. Patient will use functional memory strategies to recall events of the day and safely complete ADLs, with Supervision   Outcome: Ongoing (interventions implemented as appropriate)  1. Pt read strategies  2. Pt observed eating 3 bites w/o s/s aspiration; he then complained he was full  3. Pt completed OMEsx20 each; difficulty with buccal exercises  4. Pt attended to objects on his R and on R of tray; gave pt scanning task - completed with min A  5. Pt able to name avg of 6 items in a category  6. Pt demonstrated CSS in sentence completion tasks with SBA  7. Pt recall 2/5 items from visual; and 1/3 from verbal task

## 2017-02-19 NOTE — PLAN OF CARE
Problem: Physical Therapy Goal  Goal: Physical Therapy Goal  Goals to be met by: 3/17/2017     Patient will increase functional independence with mobility by performin). Supine to sit with Modified [place]  2). Sit to supine with Modified [place]  3). Sit to stand transfer with Stand-by Assistance  4). Bed to chair transfer with Stand-by Assistance   5). Gait x > 150 feet with Stand-by Assistance   6). Wheelchair propulsion x > 200 feet with Modified [place]   Outcome: Ongoing (interventions implemented as appropriate)  Transferred sit to sup to sit with SBA and VC's for technique to safely sit and stand each time. GT 100ft with max VC's to encourage increased distance

## 2017-02-19 NOTE — PLAN OF CARE
Problem: Occupational Therapy Goal  Goal: Occupational Therapy Goal  Goals to be met by: 3/14/17     Patient will increase functional independence with ADLs by performing:    Feeding with Set-up Assistance.  UE Dressing with Set-up Assistance.  LE Dressing with Set-up Assistance.  Grooming while seated with Set-up Assistance.  Toileting from toilet with Set-up Assistance for hygiene and clothing management.   Bathing from shower chair/bench with Set-up Assistance.  Shower transfer with Supervision.  Toilet transfer to toilet with Supervision.   Outcome: Ongoing (interventions implemented as appropriate)  OT tx for self care and fine motor retraining.

## 2017-02-19 NOTE — PT/OT/SLP PROGRESS
Speech Language Pathology Treatment          Forest Villanueva Jr.   MRN: [id number]     Diet recommendations: Solid Diet Level: Finely chopped meat  Liquid Diet Level: Nectar Thick Feed only when awake/alert, No straws, Small bites/sips, Alternating bites/sips, 1 bite/sip at a time, Check for pocketing/oral residue and Eliminate distractions    SLP Treatment Date: 17  Speech Start Time: 1130     Speech Stop Time: 1230     Speech Total (min): 60 min       TREATMENT BILLABLE MINUTES:  Speech Therapy Individual 45 and Treatment Swallowing Dysfunction 15    General Precautions: Standard, fall, aspiration          Subjective:  Pt was alert and cooperative throughout the session.    Objective:   Patient found upright in wheelchair with seat belt coming from OT.      Pain Ratin/10    1. Patient will state compensatory strategies for oral clearance/safe swallow strategies and demonstrate use of strategies MIN A  2. Patient will tolerate Level II Dysphagia DIet (finely chopped meats) with NECTAR-thickened liquids w/o overt S/S aspiration, MIN A, to improve swallow  3. Patient will complete OMEs x20 ea, MIN A, to improve R lingual/labial coordination and strength  4. Patient will attend to items on R side of tray with MIN A for demonstrated use of compensatory strategies for visiospatial skills  5. Patient will name 8 - 10 items/minute in a concrete category, MIN A, to improve word-finding and response time   6. Patient will demo clear speech strategies at the conversational level 90% of time or more, MIN A, to improve speech  7. Patient will recall 3/3 related items for imm, and post 3 minute filled delay, MIN A, to improve memory           Pain Rating Post-Intervention: 0/10    Assessment:  Forest Villanueva Jr. is a 71 y.o. male with a SLP diagnosis of Dysphagia, Dysarthria and Cognitive-Linguistic Impairment. He present with improved performance from previous day.  Outcome: Ongoing (interventions implemented as  appropriate)  1. Pt read strategies  2. Pt observed eating 3 bites w/o s/s aspiration; he then complained he was full  3. Pt completed OMEsx20 each; difficulty with buccal exercises  4. Pt attended to objects on his R and on R of tray; gave pt scanning task - completed with min A  5. Pt able to name avg of 6 items in a category  6. Pt demonstrated CSS in sentence completion tasks with SBA  7. Pt recall 2/5 items from visual; and 1/3 from verbal task    Diet recommendations:   Solid Diet Level: Finely chopped meat    Liquid Diet Level: Nectar Thick  Feed only when awake/alert, No straws, Small bites/sips, Alternating bites/sips, 1 bite/sip at a time, Check for pocketing/oral residue and Eliminate distractions    Discharge recommendations: Discharge Facility/Level Of Care Needs: home     Goals:   SLP Goals        Problem: SLP Goal    Goal Priority Disciplines Outcome   SLP Goal     SLP Ongoing (interventions implemented as appropriate)   Description:  Short Term Goals modified 2/17/2017:  1. Patient will state compensatory strategies for oral clearance/safe swallow strategies and demonstrate use of strategies MIN A  2. Patient will tolerate Level II Dysphagia DIet (finely chopped meats) with NECTAR-thickened liquids w/o overt S/S aspiration, MIN A, to improve swallow  3. Patient will complete OMEs x20 ea, MIN A, to improve R lingual/labial coordination and strength  4. Patient will attend to items on R side of tray with MIN A for demonstrated use of compensatory strategies for visiospatial skills  5. Patient will name 8 - 10 items/minute in a concrete category, MIN A, to improve word-finding and response time   6. Patient will demo clear speech strategies at the conversational level 90% of time or more, MIN A, to improve speech  7. Patient will recall 3/3 related items for imm, and post 3 minute filled delay, MIN A, to improve memory    Long Term Goals:  1. Patient will tolerate least restrictive diet without S/S  aspiration, and good oral clearance, Supervision  2. Educate Patient and family on POC and compensatory strategies for safe swallow   3. Patient will use functional memory strategies to recall events of the day and safely complete ADLs, with Supervision                   Plan:   Patient to be seen Therapy Frequency: 5 x/week   Plan of Care Expires:  (tbd)  Plan of Care reviewed with: patient  SLP Follow-up?: Yes  SLP - Next Visit Date: 02/20/17           Coni Ingram CCC-SLP 2/19/2017

## 2017-02-19 NOTE — PLAN OF CARE
Problem: Patient Care Overview  Goal: Plan of Care Review  Outcome: Ongoing (interventions implemented as appropriate)  Slept good, held lisinopril d/t BP, appetite fair, incontinent bladder, continent of bowels.No c/o pain.

## 2017-02-19 NOTE — PLAN OF CARE
Problem: Patient Care Overview  Goal: Plan of Care Review  Outcome: Ongoing (interventions implemented as appropriate)  Slept well, no complaints or problems. Incontinent of bladder, voiding schedule in progress. Safety maintained.

## 2017-02-19 NOTE — PT/OT/SLP PROGRESS
Physical Therapy         Treatment        Forest Villanueva Jr.   MRN: [id number]     PT Received On: 17  Total Time (min): 60        Billable Minutes:  Gait [id number] and Therapeutic Exercise 50  Total Minutes: 60    Treatment Type: Treatment  PT/PTA: PTA     PTA Visit Number: 2       General Precautions: Standard, pacemaker, fall  Orthopedic Precautions:     Braces:           Subjective:  Communicated with patient and nursing prior to session.    Pain Ratin/10              Pain Rating Post-Intervention: 0/10    Objective:  Patient found supine in bed, with      Functional Mobility:  Bed Mobility:   Supine to sit: Contact Guard Assistance   Sit to supine: Activity did not occur   Rolling: Standby Assistance   Scooting: Standby Assistance    Balance:   Static Sit: POOR+: Needs MINIMAL assist to maintain  Dynamic Sit:  GOOD: Maintains balance through MODERATE excursions of active trunk movement  Static Stand: FAIR: Maintains without assist but unable to take challenges  Dynamic stand: FAIR: Needs CONTACT GUARD during gait    Transfer Training:  Sit to stand:Contact Guard Assistance with No Assistive Device VC's for technique  Bed <> Chair:  Stand Pivot with Contact Guard Assistance with No Assistive Device VC's for technique    Wheelchair Training:  Pt propelled Standard wheelchair x 200 feet on Level tile with  Bilateral upper extremity and Bilateral lower extremity with Stand-by Assistance.  With VC's for correct technique and steering with obstacle negotiation    Gait Training:      Stair Training:        Additional Treatment:  Patient fatigued quickly during therex session:  SciFit L1 B UE/LE 10  AP 30  LAQ 30  Seated marches 30  Standing HR 3/10    Gait training 100ft with RW with CGA in assistance for steering, short steppage with R neglect present    Activity Tolerance:  Patient limited by fatigue    Patient left in WC with chair alarm on.    Assessment:  Forest Villanueva Jr. is a 71 y.o. male with a  medical diagnosis of <principal problem not specified>. He presents with poor endurance to tend to self care needs, poor balance, poor safety planning.    Rehab potential is fair.    Activity tolerance: Fair    Discharge recommendations:       Equipment recommendations:       GOALS:   Physical Therapy Goals        Problem: Physical Therapy Goal    Goal Priority Disciplines Outcome Goal Variances Interventions   Physical Therapy Goal     PT/OT, PT Ongoing (interventions implemented as appropriate)     Description:  Goals to be met by: 3/17/2017     Patient will increase functional independence with mobility by performin). Supine to sit with Modified [place]  2). Sit to supine with Modified [place]  3). Sit to stand transfer with Stand-by Assistance  4). Bed to chair transfer with Stand-by Assistance   5). Gait  x > 150 feet with Stand-by Assistance   6). Wheelchair propulsion x > 200 feet with Modified [place]               PLAN:    Patient to be seen daily  to address the above listed problems via gait training, therapeutic activities, therapeutic exercises, wheelchair management/training, neuromuscular re-education  Plan of Care expires: 17  Plan of Care reviewed with: patient         Patricia Huber, PTA 2017

## 2017-02-20 PROCEDURE — 97530 THERAPEUTIC ACTIVITIES: CPT

## 2017-02-20 PROCEDURE — 97116 GAIT TRAINING THERAPY: CPT

## 2017-02-20 PROCEDURE — 97542 WHEELCHAIR MNGMENT TRAINING: CPT

## 2017-02-20 PROCEDURE — 97532 *HC SP COG SKL DEV EA 15 MIN: CPT

## 2017-02-20 PROCEDURE — 63600175 PHARM REV CODE 636 W HCPCS: Performed by: PHYSICAL MEDICINE & REHABILITATION

## 2017-02-20 PROCEDURE — 63700000 PHARM REV CODE 250 ALT 637 W/O HCPCS: Performed by: PHYSICAL MEDICINE & REHABILITATION

## 2017-02-20 PROCEDURE — 92507 TX SP LANG VOICE COMM INDIV: CPT

## 2017-02-20 PROCEDURE — 25000003 PHARM REV CODE 250: Performed by: PHYSICAL MEDICINE & REHABILITATION

## 2017-02-20 PROCEDURE — 12800000 HC REHAB SEMI-PRIVATE ROOM

## 2017-02-20 PROCEDURE — 97535 SELF CARE MNGMENT TRAINING: CPT

## 2017-02-20 RX ADMIN — LACTULOSE 20 G: 10 SOLUTION ORAL at 02:02

## 2017-02-20 RX ADMIN — BRIMONIDINE TARTRATE 1 DROP: 1 SOLUTION/ DROPS OPHTHALMIC at 09:02

## 2017-02-20 RX ADMIN — DORZOLAMIDE HYDROCHLORIDE AND TIMOLOL MALEATE 1 DROP: 20; 5 SOLUTION/ DROPS OPHTHALMIC at 09:02

## 2017-02-20 RX ADMIN — LATANOPROST 1 DROP: 50 SOLUTION OPHTHALMIC at 09:02

## 2017-02-20 RX ADMIN — ENOXAPARIN SODIUM 40 MG: 100 INJECTION SUBCUTANEOUS at 12:02

## 2017-02-20 RX ADMIN — ASPIRIN 81 MG: 81 TABLET, COATED ORAL at 09:02

## 2017-02-20 RX ADMIN — CLOPIDOGREL BISULFATE 75 MG: 75 TABLET ORAL at 09:02

## 2017-02-20 RX ADMIN — DOCUSATE SODIUM 100 MG: 100 CAPSULE, LIQUID FILLED ORAL at 09:02

## 2017-02-20 RX ADMIN — METOPROLOL TARTRATE 25 MG: 25 TABLET ORAL at 10:02

## 2017-02-20 NOTE — PLAN OF CARE
Problem: Patient Care Overview  Goal: Plan of Care Review  Outcome: Ongoing (interventions implemented as appropriate)  Incontinent bladder, continent bowels, skin good, appetite  fair

## 2017-02-20 NOTE — PLAN OF CARE
Problem: Patient Care Overview  Goal: Plan of Care Review  Outcome: Ongoing (interventions implemented as appropriate)  Slept well, pain controlled, no overnight issues or incident. Safety maintained.

## 2017-02-20 NOTE — PT/OT/SLP PROGRESS
"Physical Therapy         Treatment        Forest Villanueva Jr.   MRN: [id number]     PT Received On: 17  Total Time (min): 75        Billable Minutes:  Gait Training 30, Therapeutic Activity 25 and Wheelchair mobility training 20     Total Minutes: 75    Treatment Type: Treatment  PT/PTA: PT     PTA Visit Number: 0       General Precautions: Standard, fall, pacemaker     Subjective:  Communicated with RN prior to session.    Pain Ratin/10              Pain Rating Post-Intervention: 0/10    Objective:  Patient found seated in w/c, in NAD.      Functional Mobility:  Bed Mobility:   Supine to sit: Minimal Assistance   Sit to supine: Minimal Assistance     Transfer Training:  Sit to stand:Minimal Assistance with Rolling Walker  x 8  Bed <> Chair:  Stand Pivot with Minimal Assistance with No Assistive Device to/from bed x 3    Wheelchair Trainin' x 1, 100' x 3 (slow) with min assist and cues    Gait Trainin' x 3, 85' x 1 (slow) with RW with min assist and cues    Stair Training: ascend/descend 6 4" steps with 2 rails with min assist and cues    Additional Treatment:  Reassessed function and LE strength.       Activity Tolerance:  Patient tolerated treatment well    Patient left supine with call button in reach, bed alarm on and RN notified.    Assessment:  Forest Villanueva Jr. is a 71 y.o. male     Rehab potential is good.    Activity tolerance: Fair      GOALS:   Physical Therapy Goals        Problem: Physical Therapy Goal    Goal Priority Disciplines Outcome Goal Variances Interventions   Physical Therapy Goal     PT/OT, PT Ongoing (interventions implemented as appropriate)     Description:  Goals to be met by: 3/17/2017     Patient will increase functional independence with mobility by performin). Supine to sit with Modified [place]  2). Sit to supine with Modified [place]  3). Sit to stand transfer with Stand-by Assistance  4). Bed to chair transfer with Stand-by Assistance   5). Gait  " x > 150 feet with Stand-by Assistance   6). Wheelchair propulsion x > 200 feet with Modified [place]               PLAN:    Patient to be seen daily  to address the above listed problems via gait training, therapeutic activities, therapeutic exercises, neuromuscular re-education, wheelchair management/training  Plan of Care expires: 03/17/17  Plan of Care reviewed with: patient         Christ FELIX Chapo, PT 2/20/2017

## 2017-02-20 NOTE — PT/OT/SLP PROGRESS
Occupational Therapy  Treatment    Forest Villanueva Jr.   MRN: [id number]   Admitting Diagnosis: CVA    OT Date of Treatment: 17   Total Time (min): 75 min      Billable Minutes:  Self Care/Home Management 45 and Therapeutic Activity 30  Total Minutes: 75    General Precautions: Standard, fall, pacemaker  Orthopedic Precautions: N/A  Braces: N/A    Do you have any cultural, spiritual, Taoist conflicts, given your current situation?: Yarsanism    Subjective:  Communicated with nurse prior to session.    Pain Ratin/10 (c/o stomach discomfort)           Pain Addressed: Reposition, Distraction, Nurse notified  Pain Rating Post-Intervention: 0/10    Objective:  Patient found with:  (up in w/c in pt room - w/c seatbelt alarm intact\)    Functional Mobility:  Feeding:  Drinking prune juice with lactulose using L UE with SBA    Grooming:  SBA to perform oral hygiene, combing hair and Mod (A) to shave    LE Dressing:  Patient don/doffed socks with Stand-by Assistance - from wheelchair; pt crossing LE over opposite knee to complete tasks    Balance:   Dynamic Sit: SBA    Additional Treatment:  -Reinforced ed regarding adaptive techniques to increase independence with self care tasks, etc  -Using Scrabble letter cards, pt performing word find it with Max (A) to find 3 words  -[place] Manual to facilitate increased UE fine/gross motor coordination and in-hand manipulation - SBA with verbal cues to prevent compensation    Patient left up in chair with all lines intact, call button in reach, chair alarm on and nurse notified    ASSESSMENT:  Forest Villanueva Jr. is a 71 y.o. male with a medical diagnosis of CVA and presents with improvement with B UE coordination and fine motor and should continue to benefit from skilled OT services per est POC to increase functional independence, mobility, balance, coordination, strength, and safety.     Rehab potential is good    Activity tolerance: Good    Discharge recommendations: home      Equipment recommendations:  (TBD; likely BSC and transfer tub bench)     GOALS:   Occupational Therapy Goals        Problem: Occupational Therapy Goal    Goal Priority Disciplines Outcome Interventions   Occupational Therapy Goal     OT, PT/OT Ongoing (interventions implemented as appropriate)    Description:  Goals to be met by: 3/14/17     Patient will increase functional independence with ADLs by performing:    Feeding with Set-up Assistance.  UE Dressing with Set-up Assistance.  LE Dressing with Set-up Assistance.  Grooming while seated with Set-up Assistance.  Toileting from toilet with Set-up Assistance for hygiene and clothing management.   Bathing from  shower chair/bench with Set-up Assistance.  Shower transfer with Supervision.  Toilet transfer to toilet with Supervision.                Plan:  Patient to be seen 6 x/week to address the above listed problems via self-care/home management, community/work re-entry, therapeutic activities, therapeutic exercises, therapeutic groups, neuromuscular re-education, cognitive retraining, sensory integration, wheelchair management/training  Plan of Care expires: 03/14/17  Plan of Care reviewed with: patient         GERRY Sheehan LOTR  02/20/2017

## 2017-02-20 NOTE — PROGRESS NOTES
Progress Note  Physical Medicine & Rehab    Admit Date: 2/14/2017   LOS: 6 days     SUBJECTIVE:     Follow-up For:  Daily round     Review of Systems:  denies cp, sob or abdominal discomfort     OBJECTIVE:     Vital Signs (Most Recent)  Temp: 98.3 °F (36.8 °C) (02/20/17 0508)  Pulse: (!) 51 (02/20/17 0508)  Resp: 18 (02/20/17 0508)  BP: 116/78 (02/20/17 0508)  SpO2: (!) 91 % (02/20/17 0508)    Vital Signs Range (Last 24H):  Temp:  [98 °F (36.7 °C)-98.7 °F (37.1 °C)]   Pulse:  [51-64]   Resp:  [18-20]   BP: (116-151)/(78-91)   SpO2:  [91 %-97 %]     I & O (Last 24H):  Intake/Output Summary (Last 24 hours) at 02/20/17 0737  Last data filed at 02/19/17 1600   Gross per 24 hour   Intake              720 ml   Output                0 ml   Net              720 ml     Physical Exam:  Calm, cooperative in no distress  Lung: CTA B  Heart: rrr no m  Abd: soft, NTND + BS  Ext: no edema  Skin: intact     Laboratory:  No results found for this or any previous visit (from the past 72 hour(s)).    Diagnostic Results:  no new imaging     ASSESSMENT/PLAN:     Active Hospital Problems    Diagnosis  POA    CVA (cerebral vascular accident) [I63.9]  Yes      Resolved Hospital Problems    Diagnosis Date Resolved POA   No resolved problems to display.       CVA cont PT, OT, ST   Anticoagulation, cont asa & Plavix  DVT prophylaxis, cont sq Lovenox  HTN, vitals noted, cont current meds

## 2017-02-20 NOTE — PLAN OF CARE
Problem: SLP Goal  Goal: SLP Goal  Short Term Goals modified 2/17/2017:  1. Patient will state compensatory strategies for oral clearance/safe swallow strategies and demonstrate use of strategies MIN A  2. Patient will tolerate Level II Dysphagia DIet (finely chopped meats) with NECTAR-thickened liquids w/o overt S/S aspiration, MIN A, to improve swallow  3. Patient will complete OMEs x20 ea, MIN A, to improve R lingual/labial coordination and strength  4. Patient will attend to items on R side of tray with MIN A for demonstrated use of compensatory strategies for visiospatial skills  5. Patient will name 8 - 10 items/minute in a concrete category, MIN A, to improve word-finding and response time   6. Patient will demo clear speech strategies at the conversational level 90% of time or more, MIN A, to improve speech  7. Patient will recall 3/3 related items for imm, and post 3 minute filled delay, MIN A, to improve memory    Long Term Goals:  1. Patient will tolerate least restrictive diet without S/S aspiration, and good oral clearance, Supervision  2. Educate Patient and family on POC and compensatory strategies for safe swallow   3. Patient will use functional memory strategies to recall events of the day and safely complete ADLs, with Supervision   Outcome: Ongoing (interventions implemented as appropriate)  He presents with ongoing delayed initiation and decreased intensity.  He recalls 2 clear speech strategies provided maximum assistance from SLP for repetition of clear speech strategies. HE demonstrates increased recall of 3 unrelated items for immediate and delayed recall provided MIN A for demonstration of STM strategies. ST to continue to follow. Thank you, MILE Lew., CCC-SLP

## 2017-02-20 NOTE — PLAN OF CARE
Problem: Pressure Ulcer Risk (Reed Scale) (Adult,Obstetrics,Pediatric)  Intervention: Prevent/Manage Excess Moisture  Kept clean and dry

## 2017-02-21 LAB
ALBUMIN SERPL BCP-MCNC: 3.4 G/DL
ALP SERPL-CCNC: 67 U/L
ALT SERPL W/O P-5'-P-CCNC: 14 U/L
ANION GAP SERPL CALC-SCNC: 7 MMOL/L
AST SERPL-CCNC: 15 U/L
BASOPHILS # BLD AUTO: 0 K/UL
BASOPHILS NFR BLD: 0.5 %
BILIRUB SERPL-MCNC: 0.5 MG/DL
BUN SERPL-MCNC: 21 MG/DL
CALCIUM SERPL-MCNC: 9.5 MG/DL
CHLORIDE SERPL-SCNC: 107 MMOL/L
CO2 SERPL-SCNC: 28 MMOL/L
CREAT SERPL-MCNC: 1.1 MG/DL
DIFFERENTIAL METHOD: ABNORMAL
EOSINOPHIL # BLD AUTO: 0.1 K/UL
EOSINOPHIL NFR BLD: 1.3 %
ERYTHROCYTE [DISTWIDTH] IN BLOOD BY AUTOMATED COUNT: 14.9 %
EST. GFR  (AFRICAN AMERICAN): >60 ML/MIN/1.73 M^2
EST. GFR  (NON AFRICAN AMERICAN): >60 ML/MIN/1.73 M^2
GIANT PLATELETS BLD QL SMEAR: PRESENT
GLUCOSE SERPL-MCNC: 97 MG/DL
HCT VFR BLD AUTO: 44 %
HGB BLD-MCNC: 14.3 G/DL
LYMPHOCYTES # BLD AUTO: 3 K/UL
LYMPHOCYTES NFR BLD: 47 %
MCH RBC QN AUTO: 27.7 PG
MCHC RBC AUTO-ENTMCNC: 32.4 %
MCV RBC AUTO: 86 FL
MONOCYTES # BLD AUTO: 0.7 K/UL
MONOCYTES NFR BLD: 10.6 %
NEUTROPHILS # BLD AUTO: 2.6 K/UL
NEUTROPHILS NFR BLD: 40.6 %
PLATELET # BLD AUTO: 248 K/UL
PLATELET BLD QL SMEAR: ABNORMAL
PMV BLD AUTO: 10.3 FL
POTASSIUM SERPL-SCNC: 4.1 MMOL/L
PROT SERPL-MCNC: 6.8 G/DL
RBC # BLD AUTO: 5.15 M/UL
SODIUM SERPL-SCNC: 142 MMOL/L
WBC # BLD AUTO: 6.4 K/UL

## 2017-02-21 PROCEDURE — 12800000 HC REHAB SEMI-PRIVATE ROOM

## 2017-02-21 PROCEDURE — 63600175 PHARM REV CODE 636 W HCPCS: Performed by: PHYSICAL MEDICINE & REHABILITATION

## 2017-02-21 PROCEDURE — 25000003 PHARM REV CODE 250: Performed by: PHYSICAL MEDICINE & REHABILITATION

## 2017-02-21 PROCEDURE — 97532 *HC SP COG SKL DEV EA 15 MIN: CPT

## 2017-02-21 PROCEDURE — 97116 GAIT TRAINING THERAPY: CPT

## 2017-02-21 PROCEDURE — 80053 COMPREHEN METABOLIC PANEL: CPT

## 2017-02-21 PROCEDURE — 97535 SELF CARE MNGMENT TRAINING: CPT

## 2017-02-21 PROCEDURE — 36415 COLL VENOUS BLD VENIPUNCTURE: CPT

## 2017-02-21 PROCEDURE — 97530 THERAPEUTIC ACTIVITIES: CPT

## 2017-02-21 PROCEDURE — 63700000 PHARM REV CODE 250 ALT 637 W/O HCPCS: Performed by: PHYSICAL MEDICINE & REHABILITATION

## 2017-02-21 PROCEDURE — 92507 TX SP LANG VOICE COMM INDIV: CPT

## 2017-02-21 PROCEDURE — 97110 THERAPEUTIC EXERCISES: CPT

## 2017-02-21 PROCEDURE — 97542 WHEELCHAIR MNGMENT TRAINING: CPT

## 2017-02-21 PROCEDURE — 85025 COMPLETE CBC W/AUTO DIFF WBC: CPT

## 2017-02-21 RX ADMIN — ASPIRIN 81 MG: 81 TABLET, COATED ORAL at 08:02

## 2017-02-21 RX ADMIN — ENOXAPARIN SODIUM 40 MG: 100 INJECTION SUBCUTANEOUS at 12:02

## 2017-02-21 RX ADMIN — DORZOLAMIDE HYDROCHLORIDE AND TIMOLOL MALEATE 1 DROP: 20; 5 SOLUTION/ DROPS OPHTHALMIC at 09:02

## 2017-02-21 RX ADMIN — BRIMONIDINE TARTRATE 1 DROP: 1 SOLUTION/ DROPS OPHTHALMIC at 09:02

## 2017-02-21 RX ADMIN — DOCUSATE SODIUM 100 MG: 100 CAPSULE, LIQUID FILLED ORAL at 08:02

## 2017-02-21 RX ADMIN — CLOPIDOGREL BISULFATE 75 MG: 75 TABLET ORAL at 08:02

## 2017-02-21 RX ADMIN — LATANOPROST 1 DROP: 50 SOLUTION OPHTHALMIC at 09:02

## 2017-02-21 RX ADMIN — METOPROLOL TARTRATE 25 MG: 25 TABLET ORAL at 09:02

## 2017-02-21 NOTE — PT/OT/SLP PROGRESS
Physical Therapy         Treatment        Forest Villanueva Jr.   MRN: [id number]     PT Received On: 17  Total Time (min): 75        Billable Minutes:  Gait [id number], Therapeutic Exercise 55 and Train/Wheelchair Management 10  Total Minutes: 75 (11:15-12:00 and 2:15-2:45)    Treatment Type: Treatment  PT/PTA: PTA     PTA Visit Number: 1       General Precautions: Standard, fall, pacemaker, aspiration, nectar thick  Orthopedic Precautions: Orthopedic Precautions : N/A   Braces: Braces: N/A         Subjective:  Communicated with nurse Cynthia prior to session.    Pt agreeable to therapy session in AM with no complaints. Upon entering room in PM, pt found supine in bed taking a nap. Pt was then initially reluctant to therapy, but agreeable with some encouragement.     Pain Ratin/10              Pain Rating Post-Intervention: 0/10    Objective:  Patient found seated in w/c following Speech session in AM and supine in bed in PM, with Patient found with:  (N/A)    Functional Mobility:  Bed Mobility: did not occur;    Balance:   Static Stand: FAIR: Maintains without assist but unable to take challenges  Dynamic stand: FAIR: Needs CONTACT GUARD during gait    Transfer Training:  Sit to stand:Contact Guard Assistance and Minimal Assistance with Rolling Walker .    Wheelchair Training:  Pt propelled Standard wheelchair x 200' feet on Level tile with  Bilateral upper extremity with Minimal Assistance for obstacle avoidance.     Gait Trainin' with CGA using RW. Pt amb with very slow pace      Additional Treatment:  Seated BLE therex: HR/TR, hip abd and hs curls with green tb, hip add ball squeeze 3-4 x 10 reps each    Supine BLE therex: AP, heel slides, SLR, hip abd/add 2-3 x 10 reps each    Activity Tolerance:  Patient tolerated treatment well and Patient limited by fatigue    Patient left supine with all lines intact, call button in reach, bed alarm on and nursing notified.    Assessment:  Forest Villanueva Jr. is a 71  y.o. male with a medical diagnosis of <principal problem not specified>. He presents with impaired functional mobility/independence 2' weakness, R hemiparesis, decreased balance/stability, impaired motor control/planning, decreased endurance/activity tolerance and decreased safety awareness. Pt required increased time to complete all tasks 2' slow pace and impaired processing.     Rehab potential is good.    Activity tolerance: Good      GOALS:   Physical Therapy Goals        Problem: Physical Therapy Goal    Goal Priority Disciplines Outcome Goal Variances Interventions   Physical Therapy Goal     PT/OT, PT Ongoing (interventions implemented as appropriate)     Description:  Goals to be met by: 3/17/2017     Patient will increase functional independence with mobility by performin). Supine to sit with Modified [place]  2). Sit to supine with Modified [place]  3). Sit to stand transfer with Stand-by Assistance  4). Bed to chair transfer with Stand-by Assistance   5). Gait  x > 150 feet with Stand-by Assistance   6). Wheelchair propulsion x > 200 feet with Modified [place]               PLAN:    Patient to be seen daily  to address the above listed problems via gait training, therapeutic activities, therapeutic exercises, neuromuscular re-education, wheelchair management/training  Plan of Care expires: 17  Plan of Care reviewed with: patient         Arleen Bryant, PTA 2017

## 2017-02-21 NOTE — PT/OT/SLP PROGRESS
Occupational Therapy  Treatment    Forest Villanueva Jr.   MRN: [id number]   Admitting Diagnosis: CVA    OT Date of Treatment: 17   Total Time (min): 75 min    Billable Minutes:  Self Care/Home Management 65 and Therapeutic Activity 10  Total Minutes: 75    General Precautions: Standard, fall, pacemaker, aspiration, nectar thick  Orthopedic Precautions: N/A  Braces: N/A    Do you have any cultural, spiritual, Mandaeism conflicts, given your current situation?: Oriental orthodox    Subjective:  Communicated with nurse prior to session.    Pain Ratin/10     Pain Rating Post-Intervention: 0/10    Objective:  Patient found with:  (bed in chair position with nurse, Pat present)    Functional Mobility:  Bed Mobility:   Supine to sit: Minimal Assistance    Transfer Training:   Sit to stand:Minimal Assistance with Grab bars once standing - single step verbal instruction for correct transfer technique/proper hand placement  Bed <> Chair:  Stand Pivot with Minimal Assistance with No Assistive Device verbal instruction for correct transfer technique/proper hand placement  Toilet Transfer:  Pt Stand Pivot with Minimal Assistance with grab bar once standing and bedside commode positioned over toilet single step instruction for correct transfer technique/proper hand placement  Patient tub bench transfer Stand Pivot with Minimal Assistance with grab bar once standing.    Feeding:  Patient performed feeding with Stand-by Assistance with no AE verbal instruction for adaptive techniques.    Grooming:  Stand By Assistance sitting sink side    Bathing:  Patient performed bathing with Minimal Assistance with grab bar, Handheld shower head, long-handled sponge and tub bench at Shower. - CGA in standing to wash/rinse/dry buttocks with light support of L UE on grab bar to maintain balance  >>pacemaker dressing covered and kept dry throughout bathing tasks    UE Dressing:  Patient performed UE Dressing with Stand-by Assistance with single step  verbal instruction for edwardo dressing techniques wheelchair    LE Dressing:  Patient don/doffed socks with Stand-by Assistance, Patient don/doffed adult brief with Minimal Assistance and Patient don/doffed pants with Minimal Assistance    Toilet Training:  Pt performed toileting with Minimal Assistance with grab bar once standing and bedside commode positioned over toilet     Balance:   Static Sit: GOOD: Takes MODERATE challenges from all directions  Dynamic Sit:  GOOD: Maintains balance through MODERATE excursions of active trunk movement  Static Stand: GOOD-: Takes MODERATE challenges from all directions inconsistently  Dynamic stand: FAIR: Needs CONTACT GUARD during gait - in standing with CGA and no UE support, pt performing graded reach/place x 10 numbered post-its using R UE to facilitate dynamic standing balance, standing endurance, and visual scanning skills as post its positioned out of order on wall - pt without LOB     Additional Treatment:  -Reinforced education regarding correct transfer techniques, safety, edwardo dressing techniques, etc    Patient left up in chair with call button in reach, chair alarm on, nurse notified and pt's family  present    ASSESSMENT:  Forest Villanueva Jr. is a 71 y.o. male with a medical diagnosis of CVA and presents with improvements towards OT goals as pt performing functional transfers and self care tasks with Min (A) using DME/AE. Patient requires increased time to process verbal instruction; however, follows commands appropriately. Patient should continue to benefit from skilled OT services per est POC to increase functional independence and safety.    Rehab potential is good    Activity tolerance: Good    Discharge recommendations: home     Equipment recommendations:  (TBD; likely BSC and transfer tub bench)     GOALS:   Occupational Therapy Goals        Problem: Occupational Therapy Goal    Goal Priority Disciplines Outcome Interventions   Occupational Therapy Goal     OT,  PT/OT Ongoing (interventions implemented as appropriate)    Description:  Goals to be met by: 3/14/17     Patient will increase functional independence with ADLs by performing:    Feeding with Set-up Assistance.  UE Dressing with Set-up Assistance.  LE Dressing with Set-up Assistance.  Grooming while seated with Set-up Assistance.  Toileting from toilet with Set-up Assistance for hygiene and clothing management.   Bathing from  shower chair/bench with Set-up Assistance.  Shower transfer with Supervision.  Toilet transfer to toilet with Supervision.                Plan:  Patient to be seen 6 x/week to address the above listed problems via self-care/home management, community/work re-entry, therapeutic activities, therapeutic exercises, therapeutic groups, neuromuscular re-education, cognitive retraining, sensory integration, wheelchair management/training  Plan of Care expires: 03/14/17  Plan of Care reviewed with: patient         Roseann GERRY Murphy LOTR  02/21/2017

## 2017-02-21 NOTE — PT/OT/SLP PROGRESS
Speech Language Pathology Treatment          Forest Villanueva Jr.   MRN: [id number]     Diet recommendations: Solid Diet Level: Finely chopped meat  Liquid Diet Level: Nectar Thick Feed only when awake/alert, HOB to 90 degrees, Small bites/sips, 1 bite/sip at a time, Check for pocketing/oral residue, Meds whole buried in puree, Meds whole 1 at a time and Assistance with thickening liquids    SLP Treatment Date: 02/21/17  Speech Start Time: 1042     Speech Stop Time: 1114     Speech Total (min): 32 min       TREATMENT BILLABLE MINUTES:  Speech Therapy Individual 32 and Total Time 32    General Precautions: Standard, fall, pacemaker, aspiration, nectar thick          Subjective:  Patient calm and cooperative, denies pain, Patient noted with flat affect and limited spontaneous interaction this service day.     Objective:   Patient found in [facility] lobby with family, brought to ST office, and then brought to PTA and PT gym at [facility].   Today patient completed reassessment via MoCA. Additional goals targeted included:  5. Patient will name 8 - 10 items/minute in a concrete category, MIN A, to improve word-finding and response time.   Patient names items in abstract category via MoCA word fluency probe. Patient names items x2 in given minute.  6. Patient will demo clear speech strategies at the conversational level 90% of time or more, MIN A, to improve speech.  Patient informally assessed to utilize CSS in conversation   7. Patient will recall 3/3 unrelated items post 3 minute filled delay, MIN A.  Per MoCA word recall task, Patient recalls 80% given immediate delay, MIN A. Patient recalls 60% of items post 10 minute filled delay given MIN A.   Other: Patient will answer immediate recall questions about visual scene, provided setup.  Patient successfully answers 1/7 questions       Assessment:  Forest Villanueva Jr. is a 71 y.o. male with a SLP diagnosis of Dysarthria, Visvo-Spatial Impairment and Cognitive Impairment. Patient scored  11/30 on the MoCA instrument, indicating ongoing moderate/severe difficulty in visuo-spatial skills, executive function, memory (immediate and delayed), and Patient presents with continued delay for verbal initiation and verbal response. Patient continues to require MOD to MAX A for memory in unrelated word task recall as well as recall of items from visual scene.     Discharge recommendations: Discharge Facility/Level Of Care Needs: home     Goals:   SLP Goals        Problem: SLP Goal    Goal Priority Disciplines Outcome   SLP Goal     SLP Ongoing (interventions implemented as appropriate)   Description:  Short Term Goals modified 2/17/2017:  1. Patient will state compensatory strategies for oral clearance/safe swallow strategies and demonstrate use of strategies MIN A  2. Patient will tolerate Level II Dysphagia DIet (finely chopped meats) with NECTAR-thickened liquids w/o overt S/S aspiration, MIN A, to improve swallow  3. Patient will complete OMEs x20 ea, MIN A, to improve R lingual/labial coordination and strength  4. Patient will attend to items on R side of tray with MIN A for demonstrated use of compensatory strategies for visiospatial skills  5. Patient will name 8 - 10 items/minute in a concrete category, MIN A, to improve word-finding and response time   6. Patient will demo clear speech strategies at the conversational level 90% of time or more, MIN A, to improve speech  7. Patient will recall 3/3 related items for imm, and post 3 minute filled delay, MIN A, to improve memory    Long Term Goals:  1. Patient will tolerate least restrictive diet without S/S aspiration, and good oral clearance, Supervision  2. Educate Patient and family on POC and compensatory strategies for safe swallow   3. Patient will use functional memory strategies to recall events of the day and safely complete ADLs, with Supervision                   Plan: Patient is ongoing with all initial STGs. ST to continue to follow.    Patient to be seen Therapy Frequency: 5 x/week   Plan of Care Expires:  (tbd)  Plan of Care reviewed with: patient  SLP Follow-up?: Yes  SLP - Next Visit Date: 03/10/17       Jose Davenport, student clinician    I certify that I was present in the room directing the student in service delivery and guiding them using my skilled judgment. As the co-signing therapist I have reviewed the students documentation and am responsible for the treatment, assessment, and plan.         Nicolle Pulliam, MARISELA-SLP 2/21/2017

## 2017-02-21 NOTE — PLAN OF CARE
Problem: Patient Care Overview  Goal: Plan of Care Review  Outcome: Ongoing (interventions implemented as appropriate)  Patient awake/alert. Incontinence at times. No /o pain this shift. Free from falls. Plan of care continued

## 2017-02-21 NOTE — PROGRESS NOTES
Progress Note  Physical Medicine & Rehab    Admit Date: 2/14/2017   LOS: 7 days     SUBJECTIVE:     Follow-up For:  Daily round     Review of Systems:  denies cp, sob or abdominal discomfort     OBJECTIVE:     Vital Signs (Most Recent)  Temp: 97.4 °F (36.3 °C) (02/21/17 0659)  Pulse: (!) 50 (02/21/17 0659)  Resp: 16 (02/21/17 0659)  BP: 117/72 (02/21/17 0659)  SpO2: 99 % (02/21/17 0659)    Vital Signs Range (Last 24H):  Temp:  [97.4 °F (36.3 °C)-98.4 °F (36.9 °C)]   Pulse:  [50-60]   Resp:  [16-18]   BP: (117-143)/(72-89)   SpO2:  [95 %-99 %]     I & O (Last 24H):  Intake/Output Summary (Last 24 hours) at 02/21/17 0853  Last data filed at 02/21/17 0630   Gross per 24 hour   Intake              600 ml   Output                0 ml   Net              600 ml     Physical Exam:  Calm, cooperative in no distress  Lung: CTA B  Heart: rrr no m  Abd: soft, NTND + BS  Ext: no edema  Skin: peg site stable     Laboratory:  Recent Results (from the past 72 hour(s))   CBC auto differential    Collection Time: 02/21/17  6:05 AM   Result Value Ref Range    WBC 6.40 3.90 - 12.70 K/uL    RBC 5.15 4.60 - 6.20 M/uL    Hemoglobin 14.3 14.0 - 18.0 g/dL    Hematocrit 44.0 40.0 - 54.0 %    MCV 86 82 - 98 fL    MCH 27.7 27.0 - 31.0 pg    MCHC 32.4 32.0 - 36.0 %    RDW 14.9 (H) 11.5 - 14.5 %    Platelets 248 150 - 350 K/uL    MPV 10.3 9.2 - 12.9 fL    Gran # 2.6 1.8 - 7.7 K/uL    Lymph # 3.0 1.0 - 4.8 K/uL    Mono # 0.7 0.3 - 1.0 K/uL    Eos # 0.1 0.0 - 0.5 K/uL    Baso # 0.00 0.00 - 0.20 K/uL    Gran% 40.6 38.0 - 73.0 %    Lymph% 47.0 18.0 - 48.0 %    Mono% 10.6 4.0 - 15.0 %    Eosinophil% 1.3 0.0 - 8.0 %    Basophil% 0.5 0.0 - 1.9 %    Platelet Estimate Appears normal     Large/Giant Platelets Present     Differential Method Automated    Comprehensive metabolic panel    Collection Time: 02/21/17  6:05 AM   Result Value Ref Range    Sodium 142 136 - 145 mmol/L    Potassium 4.1 3.5 - 5.1 mmol/L    Chloride 107 95 - 110 mmol/L    CO2 28 23 -  29 mmol/L    Glucose 97 70 - 110 mg/dL    BUN, Bld 21 8 - 23 mg/dL    Creatinine 1.1 0.5 - 1.4 mg/dL    Calcium 9.5 8.7 - 10.5 mg/dL    Total Protein 6.8 6.0 - 8.4 g/dL    Albumin 3.4 (L) 3.5 - 5.2 g/dL    Total Bilirubin 0.5 0.1 - 1.0 mg/dL    Alkaline Phosphatase 67 55 - 135 U/L    AST 15 10 - 40 U/L    ALT 14 10 - 44 U/L    Anion Gap 7 (L) 8 - 16 mmol/L    eGFR if African American >60 >60 mL/min/1.73 m^2    eGFR if non African American >60 >60 mL/min/1.73 m^2       Diagnostic Results:  no new imaging     ASSESSMENT/PLAN:     Active Hospital Problems    Diagnosis  POA    CVA (cerebral vascular accident) [I63.9]  Yes      Resolved Hospital Problems    Diagnosis Date Resolved POA   No resolved problems to display.       CVA cont PT, OT, ST   Nutrition, tolerating peg feeding   Anticoagulation, cont asa & Plavix  DVT prophylaxis, cont sq Lovenox  HTN, vitals noted, cont current meds

## 2017-02-21 NOTE — PROGRESS NOTES
Team conference attended and patient discussed.  Spoke with patient to discuss treatment plan, goals   and ELOS.  Spoke with patient's sister to provide an update.  Patient will return home with her at discharge.  SW will follow and assist with discharge planning as needed.

## 2017-02-21 NOTE — PLAN OF CARE
Problem: Physical Therapy Goal  Goal: Physical Therapy Goal  Goals to be met by: 3/17/2017     Patient will increase functional independence with mobility by performin). Supine to sit with Modified [place]  2). Sit to supine with Modified [place]  3). Sit to stand transfer with Stand-by Assistance  4). Bed to chair transfer with Stand-by Assistance   5). Gait x > 150 feet with Stand-by Assistance   6). Wheelchair propulsion x > 200 feet with Modified [place]   Outcome: Ongoing (interventions implemented as appropriate)  PT for gait training, w/c mobility and seated/supine BLE therex.

## 2017-02-21 NOTE — PLAN OF CARE
Problem: SLP Goal  Goal: SLP Goal  Short Term Goals modified 2/17/2017:  1. Patient will state compensatory strategies for oral clearance/safe swallow strategies and demonstrate use of strategies MIN A  2. Patient will tolerate Level II Dysphagia DIet (finely chopped meats) with NECTAR-thickened liquids w/o overt S/S aspiration, MIN A, to improve swallow  3. Patient will complete OMEs x20 ea, MIN A, to improve R lingual/labial coordination and strength  4. Patient will attend to items on R side of tray with MIN A for demonstrated use of compensatory strategies for visiospatial skills  5. Patient will name 8 - 10 items/minute in a concrete category, MIN A, to improve word-finding and response time   6. Patient will demo clear speech strategies at the conversational level 90% of time or more, MIN A, to improve speech  7. Patient will recall 3/3 related items for imm, and post 3 minute filled delay, MIN A, to improve memory    Long Term Goals:  1. Patient will tolerate least restrictive diet without S/S aspiration, and good oral clearance, Supervision  2. Educate Patient and family on POC and compensatory strategies for safe swallow   3. Patient will use functional memory strategies to recall events of the day and safely complete ADLs, with Supervision   Outcome: Ongoing (interventions implemented as appropriate)  Patient scored 11/30 on the MoCA instrument, indicating ongoing moderate/severe difficulty in visuo-spatial skills, executive function, memory (immediate and delayed), and Patient presents with continued delay for verbal initiation and verbal response. Patient continues to require MOD to MAX A for memory in unrelated word task recall as well as recall of items from visual scene

## 2017-02-22 PROCEDURE — 97530 THERAPEUTIC ACTIVITIES: CPT

## 2017-02-22 PROCEDURE — 63600175 PHARM REV CODE 636 W HCPCS: Performed by: PHYSICAL MEDICINE & REHABILITATION

## 2017-02-22 PROCEDURE — 97542 WHEELCHAIR MNGMENT TRAINING: CPT

## 2017-02-22 PROCEDURE — 97116 GAIT TRAINING THERAPY: CPT

## 2017-02-22 PROCEDURE — 12800000 HC REHAB SEMI-PRIVATE ROOM

## 2017-02-22 PROCEDURE — 97535 SELF CARE MNGMENT TRAINING: CPT

## 2017-02-22 PROCEDURE — 97110 THERAPEUTIC EXERCISES: CPT

## 2017-02-22 PROCEDURE — 97532 *HC SP COG SKL DEV EA 15 MIN: CPT

## 2017-02-22 PROCEDURE — 25000003 PHARM REV CODE 250: Performed by: PHYSICAL MEDICINE & REHABILITATION

## 2017-02-22 PROCEDURE — 63700000 PHARM REV CODE 250 ALT 637 W/O HCPCS: Performed by: PHYSICAL MEDICINE & REHABILITATION

## 2017-02-22 PROCEDURE — 92507 TX SP LANG VOICE COMM INDIV: CPT

## 2017-02-22 RX ADMIN — BRIMONIDINE TARTRATE 1 DROP: 1 SOLUTION/ DROPS OPHTHALMIC at 09:02

## 2017-02-22 RX ADMIN — BRIMONIDINE TARTRATE 1 DROP: 1 SOLUTION/ DROPS OPHTHALMIC at 08:02

## 2017-02-22 RX ADMIN — ASPIRIN 81 MG: 81 TABLET, COATED ORAL at 09:02

## 2017-02-22 RX ADMIN — CLOPIDOGREL BISULFATE 75 MG: 75 TABLET ORAL at 09:02

## 2017-02-22 RX ADMIN — DOCUSATE SODIUM 100 MG: 100 CAPSULE, LIQUID FILLED ORAL at 09:02

## 2017-02-22 RX ADMIN — DORZOLAMIDE HYDROCHLORIDE AND TIMOLOL MALEATE 1 DROP: 20; 5 SOLUTION/ DROPS OPHTHALMIC at 09:02

## 2017-02-22 RX ADMIN — LATANOPROST 1 DROP: 50 SOLUTION OPHTHALMIC at 08:02

## 2017-02-22 RX ADMIN — ENOXAPARIN SODIUM 40 MG: 100 INJECTION SUBCUTANEOUS at 01:02

## 2017-02-22 RX ADMIN — DORZOLAMIDE HYDROCHLORIDE AND TIMOLOL MALEATE 1 DROP: 20; 5 SOLUTION/ DROPS OPHTHALMIC at 08:02

## 2017-02-22 NOTE — PLAN OF CARE
Problem: Patient Care Overview  Goal: Plan of Care Review  Outcome: Ongoing (interventions implemented as appropriate)  Therapies per schedule, patient offers no complaints, v/s stable.

## 2017-02-22 NOTE — PT/OT/SLP PROGRESS
Occupational Therapy  Treatment    Forest Villanueva Jr.   MRN: [id number]   Admitting Diagnosis: CVA    OT Date of Treatment: 17   Total Time (min): 75 min      Billable Minutes:  Self Care/Home Management 25 and Therapeutic Activity 50  Total Minutes: 75    General Precautions: Standard, fall, pacemaker, aspiration, nectar thick    Do you have any cultural, spiritual, Cheondoism conflicts, given your current situation?: Yazidism    Subjective:  Communicated with nursing prior to session.  Cooperative, a little slow moving with some mild confusion at times, but he seems to work through it with extra time given.    Pain Ratin/10     Objective:   Patient was found supine in bed, shoes off, but dressed.  He was directed with a transfer from bed to a wheelchair, gait belt in use.  Light grooming completed while sitting at sink side.  Patient with provided instructions / guidance with self propelling of his wheelchair to and from the therapy gym, and for escort to avoid hitting corners and objects along the route.  Patient was educated on BUE activities done at table top for reach / grab/ and crossing midline: [facility] Stacking and Graded Clothes pins worked, all colors.    Transfer Training:   Bed <> Chair:  Stand Pivot with Minimal Assistance with Grab bars .    Grooming:  Patient peformed hand washing with Stand-by Assistance at sitting at sink.  Patient performed face washing with Stand-by Assistance at sitting at sink.  Patient performed oral hygeine with Stand-by Assistance at sitting at sink.  Patient performe hair grooming with Minimal Assistance at sitting at sink.      Patient left up in chair with all lines intact, call button in reach, chair alarm on and nursing notified    ASSESSMENT:  Forest Villanueva Jr. is a 71 y.o. male with a medical diagnosis of CVA and presents with good treatment tolerance, but decreased BUE strength..      GOALS:   Occupational Therapy Goals        Problem: Occupational Therapy Goal     Goal Priority Disciplines Outcome Interventions   Occupational Therapy Goal     OT, PT/OT Ongoing (interventions implemented as appropriate)    Description:  Goals to be met by: 3/14/17     Patient will increase functional independence with ADLs by performing:    Feeding with Set-up Assistance.  UE Dressing with Set-up Assistance.  LE Dressing with Set-up Assistance.  Grooming while seated with Set-up Assistance.  Toileting from toilet with Set-up Assistance for hygiene and clothing management.   Bathing from  shower chair/bench with Set-up Assistance.  Shower transfer with Supervision.  Toilet transfer to toilet with Supervision.                Plan:  Cont POC  Patient to be seen 6 x/week to address the above listed problems via self-care/home management, community/work re-entry, therapeutic activities, therapeutic exercises, therapeutic groups, neuromuscular re-education, cognitive retraining, sensory integration, wheelchair management/training  Plan of Care expires: 03/14/17  Plan of Care reviewed with: patient         Greg SEBASTIAN Baig  02/22/2017

## 2017-02-22 NOTE — PT/OT/SLP PROGRESS
Physical Therapy         Treatment        Forest Villanueva Jr.   MRN: [id number]     PT Received On: 17  Total Time (min): 75        Billable Minutes:  Gait [id number], Therapeutic Activity 20, Therapeutic Exercise 35 and Train/Wheelchair Management 10  Total Minutes: 75    Treatment Type: Treatment  PT/PTA: PTA     PTA Visit Number: 2       General Precautions: Standard, fall, pacemaker, aspiration, nectar thick  Orthopedic Precautions: Orthopedic Precautions : N/A   Braces: Braces: N/A         Subjective:  Communicated with nurse Cynthia prior to session.    Pt found sleeping in bed upon entering room. Pt agreeable to therapy.  Pain Ratin/10              Pain Rating Post-Intervention: 0/10    Objective:  Patient found supine in bed, with Patient found with:  (N/A)    Functional Mobility:  Bed Mobility:   Supine to sit: Minimal Assistance   Sit to supine: Supervision or Set-up Assistance   Rolling: Supervision or Set-up Assistance   Scooting: Supervision or Set-up Assistance    Balance:   Static Stand: FAIR: Maintains without assist but unable to take challenges  Dynamic stand: FAIR: Needs CONTACT GUARD during gait    Transfer Training:  Sit to stand:Contact Guard Assistance with No Assistive Device and Rolling Walker .  Bed <> Chair:  Stand Pivot with Minimal Assistance with No Assistive Device cues for proper tech and hand placement    Wheelchair Training:  Pt propelled Standard wheelchair x 175' feet on Level tile with  Bilateral lower extremity with Minimal Assistance.     Gait Trainin' with CGA using RW. Pt with small steps and slow pace, requiring cues.     Additional Treatment:    Seated BLE therex: HR/TR, ankle df with green tb, LAQ and marching with 2# B, hip abd and hs curls with green tb, hip add ball squeeze 3 x 10 reps each with a very slow pace    SciFit stepper BLE: lv 1 x 8'    Prior to OOB, pt required assistance with donning socks and shoes.     Activity Tolerance:  Patient tolerated  treatment well and Patient limited by fatigue    Patient left supine with all lines intact, call button in reach, bed alarm on and nurse Pat notified.    Assessment:  Forest Villanueva Jr. is a 71 y.o. male with a medical diagnosis of <principal problem not specified>. He presents with impaired functional mobility/independence 2' weakness, R hemiparesis, decreased balance/stability, impaired motor control/planning, decreased endurance/activity tolerance and decreased safety awareness. Pt required increased time to complete all tasks 2' slow pace and delayed processing. Pt able to increase gait distance today.     Rehab potential is good.     Activity tolerance: Good       GOALS:   Physical Therapy Goals        Problem: Physical Therapy Goal    Goal Priority Disciplines Outcome Goal Variances Interventions   Physical Therapy Goal     PT/OT, PT Ongoing (interventions implemented as appropriate)     Description:  Goals to be met by: 3/17/2017     Patient will increase functional independence with mobility by performin). Supine to sit with Modified [place]  2). Sit to supine with Modified [place]  3). Sit to stand transfer with Stand-by Assistance  4). Bed to chair transfer with Stand-by Assistance   5). Gait  x > 150 feet with Stand-by Assistance   6). Wheelchair propulsion x > 200 feet with Modified [place]               PLAN:    Patient to be seen daily  to address the above listed problems via gait training, therapeutic activities, therapeutic exercises, neuromuscular re-education, wheelchair management/training  Plan of Care expires: 17  Plan of Care reviewed with: patient         Arleen Bryant, PTA 2017

## 2017-02-22 NOTE — PLAN OF CARE
Problem: Patient Care Overview  Goal: Plan of Care Review  Outcome: Ongoing (interventions implemented as appropriate)  Patient stable, without complaint, all safety measures  In effect.

## 2017-02-22 NOTE — PROGRESS NOTES
Progress Note  Physical Medicine & Rehab    Admit Date: 2/14/2017   LOS: 8 days     SUBJECTIVE:     Follow-up For:  Daily round     Review of Systems:  denies cp, sob or abdominal discomfort     OBJECTIVE:     Vital Signs (Most Recent)  Temp: (P) 98.6 °F (37 °C) (02/22/17 0522)  Pulse: (!) 52 (02/22/17 0522)  Resp: (P) 18 (02/22/17 0522)  BP: 128/73 (02/22/17 0522)  SpO2: 97 % (02/22/17 0522)    Vital Signs Range (Last 24H):  Temp:  [98.2 °F (36.8 °C)-98.6 °F (37 °C)]   Pulse:  [50-56]   Resp:  [18]   BP: ()/(69-82)   SpO2:  [96 %-99 %]     I & O (Last 24H):  Intake/Output Summary (Last 24 hours) at 02/22/17 0820  Last data filed at 02/22/17 0645   Gross per 24 hour   Intake              720 ml   Output                0 ml   Net              720 ml     Physical Exam:  Calm, cooperative in no distress  Lung: CTA B  Heart: rrr no m  Abd: soft, NTND + BS  Ext: no edema  Skin: intact     Laboratory:  Recent Results (from the past 72 hour(s))   CBC auto differential    Collection Time: 02/21/17  6:05 AM   Result Value Ref Range    WBC 6.40 3.90 - 12.70 K/uL    RBC 5.15 4.60 - 6.20 M/uL    Hemoglobin 14.3 14.0 - 18.0 g/dL    Hematocrit 44.0 40.0 - 54.0 %    MCV 86 82 - 98 fL    MCH 27.7 27.0 - 31.0 pg    MCHC 32.4 32.0 - 36.0 %    RDW 14.9 (H) 11.5 - 14.5 %    Platelets 248 150 - 350 K/uL    MPV 10.3 9.2 - 12.9 fL    Gran # 2.6 1.8 - 7.7 K/uL    Lymph # 3.0 1.0 - 4.8 K/uL    Mono # 0.7 0.3 - 1.0 K/uL    Eos # 0.1 0.0 - 0.5 K/uL    Baso # 0.00 0.00 - 0.20 K/uL    Gran% 40.6 38.0 - 73.0 %    Lymph% 47.0 18.0 - 48.0 %    Mono% 10.6 4.0 - 15.0 %    Eosinophil% 1.3 0.0 - 8.0 %    Basophil% 0.5 0.0 - 1.9 %    Platelet Estimate Appears normal     Large/Giant Platelets Present     Differential Method Automated    Comprehensive metabolic panel    Collection Time: 02/21/17  6:05 AM   Result Value Ref Range    Sodium 142 136 - 145 mmol/L    Potassium 4.1 3.5 - 5.1 mmol/L    Chloride 107 95 - 110 mmol/L    CO2 28 23 - 29 mmol/L     Glucose 97 70 - 110 mg/dL    BUN, Bld 21 8 - 23 mg/dL    Creatinine 1.1 0.5 - 1.4 mg/dL    Calcium 9.5 8.7 - 10.5 mg/dL    Total Protein 6.8 6.0 - 8.4 g/dL    Albumin 3.4 (L) 3.5 - 5.2 g/dL    Total Bilirubin 0.5 0.1 - 1.0 mg/dL    Alkaline Phosphatase 67 55 - 135 U/L    AST 15 10 - 40 U/L    ALT 14 10 - 44 U/L    Anion Gap 7 (L) 8 - 16 mmol/L    eGFR if African American >60 >60 mL/min/1.73 m^2    eGFR if non African American >60 >60 mL/min/1.73 m^2       Diagnostic Results:  no new imaging     ASSESSMENT/PLAN:     Active Hospital Problems    Diagnosis  POA    CVA (cerebral vascular accident) [I63.9]  Yes      Resolved Hospital Problems    Diagnosis Date Resolved POA   No resolved problems to display.       CVA cont PT, OT, ST   Anticoagulation, cont asa & Plavix  DVT prophylaxis, cont sq Lovenox  HTN, vitals noted, cont current meds

## 2017-02-22 NOTE — PLAN OF CARE
Problem: Fall Risk (Adult)  Goal: Absence of Falls  Patient will demonstrate the desired outcomes by discharge/transition of care.   Outcome: Ongoing (interventions implemented as appropriate)  Patient free from falls, call light within reach, alarms in use for safety.

## 2017-02-22 NOTE — PT/OT/SLP PROGRESS
Speech Language Pathology Treatment          Forest Villanueva Jr.   MRN: [id number]     Diet recommendations: Solid Diet Level: Finely chopped meat  Liquid Diet Level: Nectar Thick Feed only when awake/alert, No straws, Small bites/sips, Alternating bites/sips, Eliminate distractions, Assistance with meals and Assistance with thickening liquids    SLP Treatment Date: 17  Speech Start Time: 0840     Speech Stop Time: 0910     Speech Total (min): 30 min       TREATMENT BILLABLE MINUTES:  Speech Therapy Individual 30    General Precautions: Standard, fall, pacemaker, aspiration, nectar thick          Subjective:  Pt was asleep when SLP walked in; was easily aroused and participated in therapy.    Objective:   Patient found in bed asleep, with  call button within reach.    Pain Ratin/10    1. Patient will state compensatory strategies for oral clearance/safe swallow strategies and demonstrate use of strategies MIN A  2. Patient will tolerate Level II Dysphagia DIet (finely chopped meats) with NECTAR-thickened liquids w/o overt S/S aspiration, MIN A, to improve swallow  3. Patient will complete OMEs x20 ea, MIN A, to improve R lingual/labial coordination and strength  4. Patient will attend to items on R side of tray with MIN A for demonstrated use of compensatory strategies for visiospatial skills  5. Patient will name 8 - 10 items/minute in a concrete category, MIN A, to improve word-finding and response time   6. Patient will demo clear speech strategies at the conversational level 90% of time or more, MIN A, to improve speech  7. Patient will recall 3/3 related items for imm, and post 3 minute filled delay, MIN A, to improve memory           Pain Rating Post-Intervention: 0/10    Assessment:  Forest Villanueva Jr. is a 71 y.o. male with a SLP diagnosis of Dysphagia, Dysarthria, Cognitive-Linguistic Impairment and Visvo-Spatial Impairment. He present with   Outcome: Ongoing (interventions implemented as  appropriate)  1. Pt verbalized he needs thickened liquids. SLP reviewed other SS strategies posted in room.  2. N/a  3. Pt completed OMEs x10; difficulty with licking lips  4. SLP sat on R of pt throughout session. Presented pt with visual and asked questions. Pt attended to right only when prompted.  5. Pt albe to name 4-5 items within 1 minute  6. Pt presented with decreased volume today; with prompting, volume increased.  7. Pt able to recall 1/3 items with verbal prompt..    Diet recommendations:   Solid Diet Level: Finely chopped meat    Liquid Diet Level: Nectar Thick  Feed only when awake/alert, No straws, Small bites/sips, Alternating bites/sips, Eliminate distractions, Assistance with meals and Assistance with thickening liquids    Discharge recommendations: Discharge Facility/Level Of Care Needs: home     Goals:   SLP Goals        Problem: SLP Goal    Goal Priority Disciplines Outcome   SLP Goal     SLP Ongoing (interventions implemented as appropriate)   Description:  Short Term Goals modified 2/17/2017:  1. Patient will state compensatory strategies for oral clearance/safe swallow strategies and demonstrate use of strategies MIN A  2. Patient will tolerate Level II Dysphagia DIet (finely chopped meats) with NECTAR-thickened liquids w/o overt S/S aspiration, MIN A, to improve swallow  3. Patient will complete OMEs x20 ea, MIN A, to improve R lingual/labial coordination and strength  4. Patient will attend to items on R side of tray with MIN A for demonstrated use of compensatory strategies for visiospatial skills  5. Patient will name 8 - 10 items/minute in a concrete category, MIN A, to improve word-finding and response time   6. Patient will demo clear speech strategies at the conversational level 90% of time or more, MIN A, to improve speech  7. Patient will recall 3/3 related items for imm, and post 3 minute filled delay, MIN A, to improve memory    Long Term Goals:  1. Patient will tolerate least  restrictive diet without S/S aspiration, and good oral clearance, Supervision  2. Educate Patient and family on POC and compensatory strategies for safe swallow   3. Patient will use functional memory strategies to recall events of the day and safely complete ADLs, with Supervision                   Plan:   Patient to be seen Therapy Frequency: 5 x/week   Plan of Care Expires:  (tbd)  Plan of Care reviewed with: patient  SLP Follow-up?: Yes  SLP - Next Visit Date: 02/23/17           Coni Ingram CCC-SLP 2/22/2017

## 2017-02-22 NOTE — PLAN OF CARE
Problem: SLP Goal  Goal: SLP Goal  Short Term Goals modified 2/17/2017:  1. Patient will state compensatory strategies for oral clearance/safe swallow strategies and demonstrate use of strategies MIN A  2. Patient will tolerate Level II Dysphagia DIet (finely chopped meats) with NECTAR-thickened liquids w/o overt S/S aspiration, MIN A, to improve swallow  3. Patient will complete OMEs x20 ea, MIN A, to improve R lingual/labial coordination and strength  4. Patient will attend to items on R side of tray with MIN A for demonstrated use of compensatory strategies for visiospatial skills  5. Patient will name 8 - 10 items/minute in a concrete category, MIN A, to improve word-finding and response time   6. Patient will demo clear speech strategies at the conversational level 90% of time or more, MIN A, to improve speech  7. Patient will recall 3/3 related items for imm, and post 3 minute filled delay, MIN A, to improve memory    Long Term Goals:  1. Patient will tolerate least restrictive diet without S/S aspiration, and good oral clearance, Supervision  2. Educate Patient and family on POC and compensatory strategies for safe swallow   3. Patient will use functional memory strategies to recall events of the day and safely complete ADLs, with Supervision   Outcome: Ongoing (interventions implemented as appropriate)  1. Pt verbalized he needs thickened liquids. SLP reviewed other SS strategies posted in room.  2. N/a  3. Pt completed OMEs x10; difficulty with licking lips  4. SLP sat on R of pt throughout session. Presented pt with visual and asked questions. Pt attended to right only when prompted.  5. Pt albe to name 4-5 items within 1 minute  6. Pt presented with decreased volume today; with prompting, volume increased.  7. Pt able to recall 1/3 items with verbal prompt.

## 2017-02-22 NOTE — PLAN OF CARE
Problem: Physical Therapy Goal  Goal: Physical Therapy Goal  Goals to be met by: 3/17/2017     Patient will increase functional independence with mobility by performin). Supine to sit with Modified [place]  2). Sit to supine with Modified [place]  3). Sit to stand transfer with Stand-by Assistance  4). Bed to chair transfer with Stand-by Assistance   5). Gait x > 150 feet with Stand-by Assistance   6). Wheelchair propulsion x > 200 feet with Modified [place]   Outcome: Ongoing (interventions implemented as appropriate)  PT session for gait training, t/f training, w/c mobility and BLE therex.

## 2017-02-23 PROCEDURE — 97535 SELF CARE MNGMENT TRAINING: CPT

## 2017-02-23 PROCEDURE — 12800000 HC REHAB SEMI-PRIVATE ROOM

## 2017-02-23 PROCEDURE — 97110 THERAPEUTIC EXERCISES: CPT

## 2017-02-23 PROCEDURE — 97530 THERAPEUTIC ACTIVITIES: CPT

## 2017-02-23 PROCEDURE — 92526 ORAL FUNCTION THERAPY: CPT

## 2017-02-23 PROCEDURE — 25000003 PHARM REV CODE 250: Performed by: PHYSICAL MEDICINE & REHABILITATION

## 2017-02-23 PROCEDURE — 92507 TX SP LANG VOICE COMM INDIV: CPT

## 2017-02-23 PROCEDURE — 97116 GAIT TRAINING THERAPY: CPT

## 2017-02-23 PROCEDURE — 63600175 PHARM REV CODE 636 W HCPCS: Performed by: PHYSICAL MEDICINE & REHABILITATION

## 2017-02-23 PROCEDURE — 63700000 PHARM REV CODE 250 ALT 637 W/O HCPCS: Performed by: PHYSICAL MEDICINE & REHABILITATION

## 2017-02-23 PROCEDURE — 97542 WHEELCHAIR MNGMENT TRAINING: CPT

## 2017-02-23 RX ADMIN — ASPIRIN 81 MG: 81 TABLET, COATED ORAL at 08:02

## 2017-02-23 RX ADMIN — ENOXAPARIN SODIUM 40 MG: 100 INJECTION SUBCUTANEOUS at 12:02

## 2017-02-23 RX ADMIN — LATANOPROST 1 DROP: 50 SOLUTION OPHTHALMIC at 08:02

## 2017-02-23 RX ADMIN — DOCUSATE SODIUM 100 MG: 100 CAPSULE, LIQUID FILLED ORAL at 08:02

## 2017-02-23 RX ADMIN — BRIMONIDINE TARTRATE 1 DROP: 1 SOLUTION/ DROPS OPHTHALMIC at 08:02

## 2017-02-23 RX ADMIN — DORZOLAMIDE HYDROCHLORIDE AND TIMOLOL MALEATE 1 DROP: 20; 5 SOLUTION/ DROPS OPHTHALMIC at 08:02

## 2017-02-23 RX ADMIN — CLOPIDOGREL BISULFATE 75 MG: 75 TABLET ORAL at 08:02

## 2017-02-23 NOTE — PT/OT/SLP PROGRESS
Occupational Therapy  Treatment    Forest Villanueva Jr.   MRN: [id number]   Admitting Diagnosis: CVA    OT Date of Treatment: 17   Total Time (min): 75 min      Billable Minutes:  Self Care/Home Management 60 and Therapeutic Activity 15  Total Minutes: 75    General Precautions: Standard, fall, aspiration, pacemaker, nectar thick  Orthopedic Precautions: N/A  Braces: N/A    Do you have any cultural, spiritual, Mandaen conflicts, given your current situation?: Nondenominational    Subjective:  Communicated with nurseCynthia prior to session.    Pain Ratin/10      Pain Rating Post-Intervention: 0/10    Objective:  Patient found with:  (up in wheelchair in pt room with seatbelt alarm intact)   -Pacemaker covered and kept dry during bathing   -Red bumpy rash low back/upper buttock area - nurseCynthia notified and to assess; OTR applying lotion per nurse request    Functional Mobility:  Transfer Training:   Sit to stand:Minimal Assistance with Grab bars once standing  Toilet Transfer:  Pt Stand Pivot with Minimal Assistance with Grab bars instruction for correct transfer techniques with proper hand placement to adhere to pacemaker precautions  Tub bench transfer Stand Pivot with Minimal Assistance with Grab bars once standing with instruction for correct transfer techniques and proper hand placement to adhere to pacemaker precautions    Grooming:  SBA sitting sink side    Bathing:  Patient performed bathing with Minimal Assistance with grab bar, Handheld shower head, long-handled sponge and tub bench at Shower.    UE Dressing:  Patient performed UE Dressing with Supervision or Set-up Assistance with no AE with instruction for edwardo dressing technique at Wheelchair. short sleeved t shirt and long sleeved shirt    LE Dressing:  Patient don/doffed socks with Supervision or Set-up Assistance, Patient don/doffed shoes with Supervision or Set-up Assistance, Patient don/doffed adult brief with Minimal Assistance and Patient don/doffed  pants with Minimal Assistance to manage clothing over hips in standing    Toilet Training:  Pt performed toileting with Minimal Assistance with grab bar and bedside commode positioend over toilet     Balance:   Static Sit: GOOD: Takes MODERATE challenges from all directions  Dynamic Sit:  GOOD-: Maintains balance through MODERATE excursions of active trunk movement,     Static Stand: FAIR+: Takes MINIMAL challenges from all directions  Dynamic stand: FAIR: Needs CONTACT GUARD during gait    Additional Treatment:  -Reinforced ed regarding correct transfer techniques, safety awareness, adherence to pacemaker precautions and proper hand placement during functional transfers, etc  -Wheelchair propulsion <50ft with Supervision  -With half a deck of cards, pt  cards into 4 piles according to suit and with other half of deck, pt  cards from pile to put in numerical ascending order to facilitate fine motor coordination, visual scanning - pt performing and completing activity with SBA and extra time    Patient left up in chair with chair alarm on, nurse notified and seated in pt common area with multiple staff members present awaiting PTA for session    ASSESSMENT:  Forest Villanueva Jr. is a 71 y.o. male with a medical diagnosis of CVA and presents with improvements towards OT goals as pt performing self care tasks with Min (A)/SBA and functional transfers with Min (A). Patient requires frequent cues for correct hand placement and UE positioning to adhere to pacemaker precautions. Pt should continue to benefit from skilled OT services per est POC to increase functional independence, mobility, and safety..    Rehab potential is good    Activity tolerance: Good    Discharge recommendations: home     Equipment recommendations:  (TBD; likely BSC and transfer tub bench)     GOALS:   Occupational Therapy Goals        Problem: Occupational Therapy Goal    Goal Priority Disciplines Outcome Interventions    Occupational Therapy Goal     OT, PT/OT Ongoing (interventions implemented as appropriate)    Description:  Goals to be met by: 3/14/17     Patient will increase functional independence with ADLs by performing:    Feeding with Set-up Assistance.  UE Dressing with Set-up Assistance.  LE Dressing with Set-up Assistance.  Grooming while seated with Set-up Assistance.  Toileting from toilet with Set-up Assistance for hygiene and clothing management.   Bathing from  shower chair/bench with Set-up Assistance.  Shower transfer with Supervision.  Toilet transfer to toilet with Supervision.                Plan:  Patient to be seen 6 x/week to address the above listed problems via self-care/home management, community/work re-entry, therapeutic activities, therapeutic exercises, therapeutic groups, neuromuscular re-education, cognitive retraining, sensory integration, wheelchair management/training  Plan of Care expires: 03/14/17  Plan of Care reviewed with: patient         Roseann GERRY Murphy LOTR  02/23/2017

## 2017-02-23 NOTE — PLAN OF CARE
Uneventful night pt resting quietly in bed no distress noted. AAOx 3 with no c/o pain and facial expression noted calm and relaxed. Vitals stable view graphics and pt denies n/v at this time. Pt free from injuries no falls noted and safety maintained will continue to monitor.

## 2017-02-23 NOTE — PT/OT/SLP PROGRESS
"Speech Language Pathology Treatment          Forest Villanueva Jr.   MRN: [id number]     Diet recommendations: Solid Diet Level: Finely chopped meat  Liquid Diet Level: Nectar Thick Feed only when awake/alert, No straws, Small bites/sips, Alternating bites/sips, Remain upright 30 minutes post meal, Eliminate distractions, Assistance with meals and Assistance with thickening liquids    SLP Treatment Date: 02/23/17  Speech Start Time: 0735 /8:35  Speech Stop Time: 0744 /9:00  Speech Total (min): 9 min   /25 mi  +pt seen for two sessions today, total indira= 34 minutes  TREATMENT BILLABLE MINUTES:  Speech Therapy Individual 25, Treatment Swallowing Dysfunction 9 and Total Time 34    General Precautions: Standard, fall, aspiration, nectar thick        Subjective:  "I don't have my appetite"  He denies pain  He presents alert and cooperative    Objective:   Patient found in [facility] dining room with breakfast meal tray, nurse with patient in [facility] dining room and ST session discontinued. Patient then seen later service day in ST office then brought to room at end of session.     1. Patient will state compensatory strategies for oral clearance/safe swallow strategies and demonstrate use of strategies MIN [place] 2 strategies today I'ly, requires MIN A to recall up to 4 with meal tray.   2. Patient will tolerate Level II Dysphagia DIet (finely chopped meats) with NECTAR-thickened liquids w/o overt S/S aspiration, MIN A, to improve swallow. Patient with minimal PO intake with am tray today, nurse aware. He tolerates self-fed bites of finely chopped meats and semi-soft items (eggs/grits) w/o overt S?S aspiration and no anterior spillage. Patient tolerates self-fed cup sips nectar-thickened liquids x8 w/o overt S/S aspiration and no anterior spillage.  Patien uses tongue sweep independently to monitor R buccal cavity.    4. Patient will attend to items on R side of tray with MIN A for demonstrated use of compensatory strategies for " visiospatial skills  5. Patient will name 8 - 10 items/minute in a concrete category, MIN A, to improve word-finding and response time   Patient names items in concrete categories, presidents x4, sports x6, languages x4, MIN A within one minute.  6. Patient will demo clear speech strategies at the conversational level 90% of time or more, MIN A, to improve speech  Patient approximately 90% intelligible during conversational speech and during therapy tasks.  7. Patient will recall 3/3 related items for imm, and post 3 minute filled delay, MIN A, to improve memory  Patient recalls related items (pants, sweater, mittens), 3/3 immediately, and 1/3 MIN A given approx 10 minute filled delay. 3/3 recalled with MAX A.  Other: Patient will attend to items in visual field MIN A.  Patient names 10/13 items from FO17 provided MIN A.     Assessment:  Forest Villanueva Jr. is a 71 y.o. male with a SLP diagnosis of Dysphagia, Dysarthria and Cognitive-Linguistic Impairment. Today, he tolerates self-fed bites of Dysphagia Level II diet with NECTAR-thickened liquids w/o overt S/S aspiration.  Patient continues with decreased loudness in conversation. Patient with decreased naming and recall of related items, requiring MAX A to recall 3/3 items. Patient educated in compensatory strategies for recall and word fluency, Patient v/u and repeats strategies to SLP student clinician at [employer].    Discharge recommendations: Discharge Facility/Level Of Care Needs: home     Goals:   SLP Goals        Problem: SLP Goal    Goal Priority Disciplines Outcome   SLP Goal     SLP Ongoing (interventions implemented as appropriate)   Description:  Short Term Goals modified 2/23/2017:  1. Patient will state compensatory strategies for oral clearance/safe swallow strategies and demonstrate use of strategies MIN A  2. Patient will tolerate Level II Dysphagia DIet (finely chopped meats) with NECTAR-thickened liquids w/o overt S/S aspiration, MIN A, to improve  swallow  3. Patient will complete OMEs x20 ea, MIN A, to improve R lingual/labial coordination and strength  4. Patient will attend to items on R side of tray with MIN A for demonstrated use of compensatory strategies for visiospatial skills  5. Patient will name 8 - 10 items/minute in a concrete category, MIN A, to improve word-finding and response time   6. Patient will demo clear speech strategies at the conversational level 90% of time or more, MIN A, to improve speech  7. Patient will recall 3/3 related items for imm, and post 3 minute filled delay, MIN A, to improve memory  8. NEW: Patient will tolerate trials of thin liquids w/o overt S/S aspiration, MIN A for use of safe swallow strategies, to improve swallow   Long Term Goals:  1. Patient will tolerate least restrictive diet without S/S aspiration, and good oral clearance, Supervision  2. Educate Patient and family on POC and compensatory strategies for safe swallow   3. Patient will use functional memory strategies to recall events of the day and safely complete ADLs, with Supervision                    Plan: Continue POC and add goal 8 to continue to assess safety of thin liquids to determine feasibility of diet upgrade.   Patient to be seen Therapy Frequency: 5 x/week   Plan of Care Expires: 03/10/17  Plan of Care reviewed with: patient  SLP Follow-up?: Yes  SLP - Next Visit Date: 02/24/17       Jose Davenport, student clinician  VIOLETA Lew, CCC-SLP    I certify that I was present in the room directing the student in service delivery and guiding them using my skilled judgment. As the co-signing therapist I have reviewed the students documentation and am responsible for the treatment, assessment, and plan.     Nicolle Pulliam, CCC-SLP 2/23/2017

## 2017-02-23 NOTE — PLAN OF CARE
Problem: SLP Goal  Goal: SLP Goal  Short Term Goals modified 2/23/2017:  1. Patient will state compensatory strategies for oral clearance/safe swallow strategies and demonstrate use of strategies MIN A  2. Patient will tolerate Level II Dysphagia DIet (finely chopped meats) with NECTAR-thickened liquids w/o overt S/S aspiration, MIN A, to improve swallow  3. Patient will complete OMEs x20 ea, MIN A, to improve R lingual/labial coordination and strength  4. Patient will attend to items on R side of tray with MIN A for demonstrated use of compensatory strategies for visiospatial skills  5. Patient will name 8 - 10 items/minute in a concrete category, MIN A, to improve word-finding and response time   6. Patient will demo clear speech strategies at the conversational level 90% of time or more, MIN A, to improve speech  7. Patient will recall 3/3 related items for imm, and post 3 minute filled delay, MIN A, to improve memory  8. NEW: Patient will tolerate trials of thin liquids w/o overt S/S aspiration, MIN A for use of safe swallow strategies, to improve swallow   Long Term Goals:  1. Patient will tolerate least restrictive diet without S/S aspiration, and good oral clearance, Supervision  2. Educate Patient and family on POC and compensatory strategies for safe swallow   3. Patient will use functional memory strategies to recall events of the day and safely complete ADLs, with Supervision   Outcome: Ongoing (interventions implemented as appropriate)  Patient with ongoing Dysarthria, Dysphagia and Cognitive-linguistic Impairment with visiospatial deficits. Today he tolerates self-fed bites of Dysphagia Level II diet with NECTAR-thickened liquids w/o overt S/S aspiration.  Patient continues with decreased loudness in conversation. Patient with decreased naming and recall of related items, requiring MAX A to recall 3/3 items. Patient educated in compensatory strategies for recall and word fluency, Patient v/u and repeats  strategies to SLP student clinician at [facility].Note new goal #8 added to continue to assess safety of thin liquids to determine feasibility of diet upgrade.  ST to continue to follow. MILE Lew., CCC-SLP

## 2017-02-23 NOTE — PROGRESS NOTES
Progress Note  Physical Medicine & Rehab    Admit Date: 2/14/2017   LOS: 9 days     SUBJECTIVE:     Follow-up For:  Daily round     Review of Systems:  denies cp, sob or abdominal discomfort     OBJECTIVE:     Vital Signs (Most Recent)  Temp: 98 °F (36.7 °C) (02/22/17 2000)  Pulse: (!) 50 (02/23/17 0714)  Resp: 18 (02/23/17 0714)  BP: 132/71 (02/23/17 0714)  SpO2: 100 % (02/23/17 0714)    Vital Signs Range (Last 24H):  Temp:  [97.9 °F (36.6 °C)-98 °F (36.7 °C)]   Pulse:  [50-51]   Resp:  [17-18]   BP: (111-133)/(71-84)   SpO2:  [99 %-100 %]     I & O (Last 24H):  Intake/Output Summary (Last 24 hours) at 02/23/17 0755  Last data filed at 02/22/17 1818   Gross per 24 hour   Intake              890 ml   Output                0 ml   Net              890 ml     Physical Exam:  Calm, cooperative in no distress  Lung: CTA B  Heart: rrr no m  Abd: soft, NTND + BS  Ext: no edema  Skin: intact     Laboratory:  Recent Results (from the past 72 hour(s))   CBC auto differential    Collection Time: 02/21/17  6:05 AM   Result Value Ref Range    WBC 6.40 3.90 - 12.70 K/uL    RBC 5.15 4.60 - 6.20 M/uL    Hemoglobin 14.3 14.0 - 18.0 g/dL    Hematocrit 44.0 40.0 - 54.0 %    MCV 86 82 - 98 fL    MCH 27.7 27.0 - 31.0 pg    MCHC 32.4 32.0 - 36.0 %    RDW 14.9 (H) 11.5 - 14.5 %    Platelets 248 150 - 350 K/uL    MPV 10.3 9.2 - 12.9 fL    Gran # 2.6 1.8 - 7.7 K/uL    Lymph # 3.0 1.0 - 4.8 K/uL    Mono # 0.7 0.3 - 1.0 K/uL    Eos # 0.1 0.0 - 0.5 K/uL    Baso # 0.00 0.00 - 0.20 K/uL    Gran% 40.6 38.0 - 73.0 %    Lymph% 47.0 18.0 - 48.0 %    Mono% 10.6 4.0 - 15.0 %    Eosinophil% 1.3 0.0 - 8.0 %    Basophil% 0.5 0.0 - 1.9 %    Platelet Estimate Appears normal     Large/Giant Platelets Present     Differential Method Automated    Comprehensive metabolic panel    Collection Time: 02/21/17  6:05 AM   Result Value Ref Range    Sodium 142 136 - 145 mmol/L    Potassium 4.1 3.5 - 5.1 mmol/L    Chloride 107 95 - 110 mmol/L    CO2 28 23 - 29 mmol/L     Glucose 97 70 - 110 mg/dL    BUN, Bld 21 8 - 23 mg/dL    Creatinine 1.1 0.5 - 1.4 mg/dL    Calcium 9.5 8.7 - 10.5 mg/dL    Total Protein 6.8 6.0 - 8.4 g/dL    Albumin 3.4 (L) 3.5 - 5.2 g/dL    Total Bilirubin 0.5 0.1 - 1.0 mg/dL    Alkaline Phosphatase 67 55 - 135 U/L    AST 15 10 - 40 U/L    ALT 14 10 - 44 U/L    Anion Gap 7 (L) 8 - 16 mmol/L    eGFR if African American >60 >60 mL/min/1.73 m^2    eGFR if non African American >60 >60 mL/min/1.73 m^2       Diagnostic Results:  no new imaging     ASSESSMENT/PLAN:     Active Hospital Problems    Diagnosis  POA    CVA (cerebral vascular accident) [I63.9]  Yes      Resolved Hospital Problems    Diagnosis Date Resolved POA   No resolved problems to display.       CVA cont PT, OT, ST   Anticoagulation, cont asa & Plavix  DVT prophylaxis, cont sq Lovenox  HTN, vitals noted, cont current meds

## 2017-02-23 NOTE — PLAN OF CARE
Problem: Fall Risk (Adult)  Goal: Absence of Falls  Patient will demonstrate the desired outcomes by discharge/transition of care.   Outcome: Ongoing (interventions implemented as appropriate)  Patient free from falls, alarms in use.

## 2017-02-23 NOTE — PT/OT/SLP PROGRESS
Physical Therapy         Treatment        Forest Villanueva Jr.   MRN: [id number]     PT Received On: 17  Total Time (min): 75        Billable Minutes:  Gait [id number], Therapeutic Exercise 45 and Train/Wheelchair Management 10  Total Minutes: 75    Treatment Type: Treatment  PT/PTA: PTA     PTA Visit Number: 3       General Precautions: Standard, fall, aspiration, nectar thick, pacemaker  Orthopedic Precautions: Orthopedic Precautions : N/A   Braces: Braces: N/A         Subjective:  Communicated with nursing and OT Roseann prior to session.    Pt agreeable to therapy with no complaints  Pain Ratin/10              Pain Rating Post-Intervention: 0/10    Objective:  Patient found seated in w/c, with Patient found with:  (N/A)    Functional Mobility:  Bed Mobility: did not occur    Balance:   Static Stand: FAIR+: Takes MINIMAL challenges from all directions  Dynamic stand: FAIR+: Needs CLOSE SUPERVISION during gait and is able to right self with minor LOB    Transfer Training:  Sit to stand:Contact Guard Assistance with Rolling Walker and II bars .    Wheelchair Training:  Pt propelled Standard wheelchair x 200' feet on Level tile with  Bilateral lower extremity with Supervision or Set-up Assistance.     Gait Trainin' x 2 with  CGA/SBA using RW. Increased time required 2' slow pace.      Additional Treatment:  Seated BLE therex: HR/TR, ankle df with green tb, LAQ and marching with 2# B, hip abd and hs curls with green tb, hip add ball squeeze 3 x 10 reps each with a very slow pace    Standing BLE therex: calf raises, marching, hip abd/add, mini squats, toe taps on to airex x 20-25 reps each    Activity Tolerance:  Patient tolerated treatment well and Patient limited by fatigue    Patient left up in chair with all lines intact, chair alarm on and nursing present/notified notified.    Assessment:  Forest Villanueva Jr. is a 71 y.o. male with a medical diagnosis of <principal problem not specified>. He presents  with impaired functional mobility/independence 2' weakness, R hemiparesis, decreased balance/stability, impaired motor control/planning, decreased endurance/activity tolerance and decreased safety awareness. Pt continues to require increased time to complete all tasks 2' slow pace and delayed processing.       Rehab potential is good.      Activity tolerance: Good      GOALS:   Physical Therapy Goals        Problem: Physical Therapy Goal    Goal Priority Disciplines Outcome Goal Variances Interventions   Physical Therapy Goal     PT/OT, PT Ongoing (interventions implemented as appropriate)     Description:  Goals to be met by: 3/17/2017     Patient will increase functional independence with mobility by performin). Supine to sit with Modified [place]  2). Sit to supine with Modified [place]  3). Sit to stand transfer with Stand-by Assistance  4). Bed to chair transfer with Stand-by Assistance   5). Gait  x > 150 feet with Stand-by Assistance   6). Wheelchair propulsion x > 200 feet with Modified [place]               PLAN:    Patient to be seen daily  to address the above listed problems via gait training, therapeutic activities, therapeutic exercises, neuromuscular re-education, wheelchair management/training  Plan of Care expires: 17  Plan of Care reviewed with: patient         Arleen Bryant, PTA 2017

## 2017-02-24 PROBLEM — E44.1 MILD MALNUTRITION: Status: ACTIVE | Noted: 2017-02-24

## 2017-02-24 PROCEDURE — 97530 THERAPEUTIC ACTIVITIES: CPT

## 2017-02-24 PROCEDURE — 97542 WHEELCHAIR MNGMENT TRAINING: CPT

## 2017-02-24 PROCEDURE — 92526 ORAL FUNCTION THERAPY: CPT

## 2017-02-24 PROCEDURE — 63600175 PHARM REV CODE 636 W HCPCS: Performed by: PHYSICAL MEDICINE & REHABILITATION

## 2017-02-24 PROCEDURE — 97110 THERAPEUTIC EXERCISES: CPT

## 2017-02-24 PROCEDURE — 12800000 HC REHAB SEMI-PRIVATE ROOM

## 2017-02-24 PROCEDURE — 25000003 PHARM REV CODE 250: Performed by: PHYSICAL MEDICINE & REHABILITATION

## 2017-02-24 PROCEDURE — 92507 TX SP LANG VOICE COMM INDIV: CPT

## 2017-02-24 PROCEDURE — 97535 SELF CARE MNGMENT TRAINING: CPT

## 2017-02-24 PROCEDURE — 97116 GAIT TRAINING THERAPY: CPT

## 2017-02-24 PROCEDURE — 63700000 PHARM REV CODE 250 ALT 637 W/O HCPCS: Performed by: PHYSICAL MEDICINE & REHABILITATION

## 2017-02-24 PROCEDURE — 97802 MEDICAL NUTRITION INDIV IN: CPT

## 2017-02-24 RX ADMIN — LATANOPROST 1 DROP: 50 SOLUTION OPHTHALMIC at 09:02

## 2017-02-24 RX ADMIN — ASPIRIN 81 MG: 81 TABLET, COATED ORAL at 08:02

## 2017-02-24 RX ADMIN — DORZOLAMIDE HYDROCHLORIDE AND TIMOLOL MALEATE 1 DROP: 20; 5 SOLUTION/ DROPS OPHTHALMIC at 08:02

## 2017-02-24 RX ADMIN — DORZOLAMIDE HYDROCHLORIDE AND TIMOLOL MALEATE 1 DROP: 20; 5 SOLUTION/ DROPS OPHTHALMIC at 09:02

## 2017-02-24 RX ADMIN — DOCUSATE SODIUM 100 MG: 100 CAPSULE, LIQUID FILLED ORAL at 08:02

## 2017-02-24 RX ADMIN — CLOPIDOGREL BISULFATE 75 MG: 75 TABLET ORAL at 08:02

## 2017-02-24 RX ADMIN — ENOXAPARIN SODIUM 40 MG: 100 INJECTION SUBCUTANEOUS at 12:02

## 2017-02-24 RX ADMIN — BRIMONIDINE TARTRATE 1 DROP: 1 SOLUTION/ DROPS OPHTHALMIC at 09:02

## 2017-02-24 RX ADMIN — BRIMONIDINE TARTRATE 1 DROP: 1 SOLUTION/ DROPS OPHTHALMIC at 08:02

## 2017-02-24 NOTE — PROGRESS NOTES
[facility]  Adult Nutrition  Progress Note    SUMMARY     Recommendations    Recommendation/Intervention: 1) Continuer Cardiac diet with texture per SLP recs 2) Boost PLus with meals  Goals: 1) Meal intake 75% 2) Utilize Boost  Nutrition Goal Status: new  Communication of RD Recs:  (sticky note, second sign)    1. CVA (cerebral vascular accident)      History reviewed. No pertinent past medical history.    Continuum of Care Plan    Referral to Outpatient Services:  (Cardiac with texture per SLP, Boost prn)    Reason for Assessment    Reason for Assessment: RD follow-up   Interdisciplinary Rounds: did not attend   General Information Comments: Pt reports he has a good appetite, but doesn't eat a lot.  Meal intake 50% of last 3 documented meals.  Note pt reports his UBW is 170 lbs at an MD visit last year (unclear of when last year).  This shows a 16% wt loss in an undetermined amount of time.    Nutrition Prescription Ordered    Current Diet Order: Cardiac with texture per SLP rec  Nutrition Order Comments: dysphagia 2, nectar thick liquids     Evaluation of Received Nutrients/Fluid Intake   Energy Calories Required: not meeting needs   Protein Required: not meeting needs   Other Fluid (mL): 600 (per I&O)    Comments: Meal intake 50% of last 3 documented meals        Nutrition Risk Screen     Nutrition Risk Screen: no indicators present    Nutrition/Diet History    Patient Reported Diet/Restrictions/Preferences: general   Factors Affecting Nutritional Intake: decreased appetite, difficulty/impaired swallowing        Labs/Tests/Procedures/Meds    Diagnostic Test/Procedure Review: reviewed, pertinent swallow eval  Pertinent Labs Reviewed: reviewed, pertinent      BMP  Lab Results   Component Value Date     02/21/2017    K 4.1 02/21/2017     02/21/2017    CO2 28 02/21/2017    BUN 21 02/21/2017    CREATININE 1.1 02/21/2017    CALCIUM 9.5 02/21/2017    ANIONGAP 7 (L) 02/21/2017     ESTGFRAFRICA >60 02/21/2017    EGFRNONAA >60 02/21/2017     No results found for: CHOL  No results found for: HDL  No results found for: LDLCALC  No results found for: TRIG  No results found for: CHOLHDL    Pertinent Medications Reviewed: reviewed, pertinent      Scheduled Meds:   aspirin  81 mg Oral Daily    brimonidine 0.1%  1 drop Both Eyes BID    clopidogrel  75 mg Oral Daily    docusate sodium  100 mg Oral Daily    dorzolamide-timolol 2-0.5%  1 drop Both Eyes BID    enoxaparin  40 mg Subcutaneous Daily    latanoprost  1 drop Both Eyes QHS    lisinopril  2.5 mg Oral Daily    metoprolol tartrate  25 mg Oral BID     Continuous Infusions:   PRN Meds:.bisacodyl, lactulose    Physical Findings    Overall Physical Appearance: underweight   Oral/Mouth Cavity: teeth absent  Skin:  (Uvaldo score 17)    Anthropometrics    Height Method: Stated  Height (inches): 65.98 in  Weight Method: Bed Scale  Weight (kg): 64.5 kg  Ideal Body Weight (IBW), Male: 141.88 lb   % Ideal Body Weight, Male (lb): 100.23 lb   BMI (kg/m2): 22.96  BMI Grade:  (Underweight r/t BMI <23 in 65 yo)  Usual Body Weight (UBW), kg:  (Reports  lbs last year sometime at MD office, showing 16% wt loss in undetermined time frame)     Anthropometrics (Special Considerations)       Estimated/Assessed Needs    Weight Used For Calorie Calculations: 64.5 kg (142 lb 3.2 oz)   Height (cm): 167.6 cm   Energy Need Method: Kcal/kg    30 kcal/kg (kcal): 1935 and 35 kcal/kg (kcal): 2257.5 for wt gain  Weight Used For Protein Calculations: 64.5 kg (142 lb 3.2 oz)   1.0 gm Protein (gm): 64.63 and 1.2 gm Protein (gm): 77.56  Fluid Need Method:  (1 ml/kcal or per MD rec)     Monitor and Evaluation    Food and Nutrient Intake: energy intake  Food and Nutrient Adminstration: diet order   Anthropometric Measurements: weight, weight change  Biochemical Data, Medical Tests and Procedures: lipid profile, electrolyte and renal panel  Nutrition-Focused Physical  Findings: overall appearance, skin    Nutrition Risk    Level of Risk:  (2 x weekly)    Nutrition Follow-Up    RD Follow-up?: Yes    Assessment and Plan    Mild malnutrition  Nutrition Problem:   Mild Malnutrition    % Intake of Estimated Energy Needs: 25 - 50 %  % Meal Intake: 50%  Malnutrition Reason: Other: BMI <23 in 65 yo (22.9 BMI)  Moderate Weight Loss Acute Illness/Injury: Other: na  Moderate Weight Loss Social/Environmental: Other: na  Moderate Weight Loss Chronic Illness: Other: na    Etiology/Related to:   Limited intake AEB meal intake 50%    Treatment Strategy:   1) Continue cardiac diet 2) Add Boost Plus with meals    Nutrition Diagnosis Status:   New

## 2017-02-24 NOTE — PROGRESS NOTES
Progress Note  Physical Medicine & Rehab    Admit Date: 2/14/2017   LOS: 10 days     SUBJECTIVE:     Follow-up For:  Daily round     Review of Systems:  denies cp, sob or abdominal discomfort     OBJECTIVE:     Vital Signs (Most Recent)  Temp: 98.3 °F (36.8 °C) (02/24/17 0513)  Pulse: (!) 52 (02/24/17 0513)  Resp: 18 (02/24/17 0513)  BP: 124/89 (02/24/17 0513)  SpO2: 100 % (02/24/17 0513)    Vital Signs Range (Last 24H):  Temp:  [98 °F (36.7 °C)-98.3 °F (36.8 °C)]   Pulse:  [50-52]   Resp:  [18]   BP: (124-138)/(76-89)   SpO2:  [95 %-100 %]     I & O (Last 24H):  Intake/Output Summary (Last 24 hours) at 02/24/17 0828  Last data filed at 02/23/17 1825   Gross per 24 hour   Intake              600 ml   Output                0 ml   Net              600 ml     Physical Exam:  Calm, cooperative in no distress  Lung: CTA B  Heart: rrr no m  Abd: soft, NTND + BS  Ext: no edema  Skin: intact     Laboratory:  No results found for this or any previous visit (from the past 72 hour(s)).    Diagnostic Results:  no new imaging     ASSESSMENT/PLAN:     Active Hospital Problems    Diagnosis  POA    CVA (cerebral vascular accident) [I63.9]  Yes      Resolved Hospital Problems    Diagnosis Date Resolved POA   No resolved problems to display.       CVA cont PT, OT, ST   Anticoagulation, cont asa & Plavix  DVT prophylaxis, cont sq Lovenox  HTN, vitals noted, cont current meds

## 2017-02-24 NOTE — PT/OT/SLP PROGRESS
Speech Language Pathology Treatment          Forest Villanueva Jr.   MRN: [id number]     Diet recommendations: Solid Diet Level: Finely chopped meat  Liquid Diet Level: [place] Thick     SLP Treatment Date: 02/24/17  Speech Start Time: 1120     Speech Stop Time: 1150     Speech Total (min): 30 min       TREATMENT BILLABLE MINUTES:  Speech Therapy Individual 21 and Treatment Swallowing Dysfunction 9    General Precautions: Standard, fall, aspiration, nectar thick          Subjective:  Patient pleasant and cooperative for treatment.  Up in w/c in room.  Increased verbalization as compared to when last seen by this SLP.    Objective:   1. Patient will attend to items on R side of tray with MIN A for demonstrated use of compensatory strategies for visiospatial skills  2. Patient will name 8 - 10 items/minute in a concrete category, MIN A, to improve word-finding and response time   3. NEW: Patient will tolerate trials of thin liquids w/o overt S/S aspiration, MIN A for use of safe swallow strategies, to improve swallow   Assessment:  Forest Villanueva Jr. is a 71 y.o. male with a SLP diagnosis of Dysphagia, Dysarthria and Cognitive-Linguistic Impairment.  Patient named an average of 9 items in a given concrete category given extra time and cues for mental endurance.  Patient tolerated thin liquid trials with cough 1 of 4 trial sips.  Cough occurred on final and largest sip requiring several swallows to clear.  Patient attended to items during scanning task with MOD assist.  MOCA initiated as reassessment.    Discharge recommendations: Discharge Facility/Level Of Care Needs: home     Goals:   SLP Goals        Problem: SLP Goal    Goal Priority Disciplines Outcome   SLP Goal     SLP Ongoing (interventions implemented as appropriate)   Description:  Short Term Goals modified 2/23/2017:  1. Patient will state compensatory strategies for oral clearance/safe swallow strategies and demonstrate use of strategies MIN A  2. Patient will  tolerate Level II Dysphagia DIet (finely chopped meats) with NECTAR-thickened liquids w/o overt S/S aspiration, MIN A, to improve swallow  3. Patient will complete OMEs x20 ea, MIN A, to improve R lingual/labial coordination and strength  4. Patient will attend to items on R side of tray with MIN A for demonstrated use of compensatory strategies for visiospatial skills  5. Patient will name 8 - 10 items/minute in a concrete category, MIN A, to improve word-finding and response time   6. Patient will demo clear speech strategies at the conversational level 90% of time or more, MIN A, to improve speech  7. Patient will recall 3/3 related items for imm, and post 3 minute filled delay, MIN A, to improve memory  8. NEW: Patient will tolerate trials of thin liquids w/o overt S/S aspiration, MIN A for use of safe swallow strategies, to improve swallow   Long Term Goals:  1. Patient will tolerate least restrictive diet without S/S aspiration, and good oral clearance, Supervision  2. Educate Patient and family on POC and compensatory strategies for safe swallow   3. Patient will use functional memory strategies to recall events of the day and safely complete ADLs, with Supervision                    Plan:   Patient to be seen Therapy Frequency: 5 x/week   Plan of Care Expires: 03/10/17  Plan of Care reviewed with: patient  SLP Follow-up?: Yes  SLP - Next Visit Date: 02/24/17           Archana Olea CCC-SLP 2/24/2017

## 2017-02-24 NOTE — PT/OT/SLP PROGRESS
"Physical Therapy         Treatment        Forest Villanueva Jr.   MRN: [id number]     PT Received On: 17  Total Time (min): 75        Billable Minutes:  Gait [id number], Therapeutic Activity 15, Therapeutic Exercise 30 and Train/Wheelchair Management 10  Total Minutes: 75    Treatment Type: Treatment  PT/PTA: PTA     PTA Visit Number: 4       General Precautions: Standard, fall, aspiration, pacemaker, nectar thick  Orthopedic Precautions: Orthopedic Precautions : N/A   Braces: Braces: N/A         Subjective:  Communicated with nursing prior to session.    Pt agreeable to therapy with no complaints.  Pain Ratin/10              Pain Rating Post-Intervention: 0/10    Objective:  Patient found seated in w/c finishing OT session, with Patient found with:  (N/A)    Functional Mobility:  Bed Mobility: did not occur    Balance:   Static Stand: FAIR+: Takes MINIMAL challenges from all directions  Dynamic stand: FAIR+: Needs CLOSE SUPERVISION during gait and is able to right self with minor LOB    Transfer Training:  Sit to stand:Contact Guard Assistance with No Assistive Device and Rolling Walker .    Wheelchair Training:  Pt propelled Standard wheelchair x 200' feet on Level tile with  Bilateral lower extremity with Supervision or Set-up Assistance.     Gait Trainin' and 100' with CGA/SBA using RW. Pt with a slow,steady pace and small steps, requiring cues to correct.     Stair Training:  Pt ascended/descend 6 at 4" stair(s) with No Assistive Device with B handrail with Minimal Assistance and cues and demo required for proper technique and sequencing.       Additional Treatment:  Seated BLE therex: HR/TR, ankle df with blue tb, LAQ and marching with 2# B, hip abd and hs curls with blue tb, hip add ball squeeze 3 x 10 reps each with a very slow pace    Pt requried CGA for standing balance while in room using the urinal.     Activity Tolerance:  Patient tolerated treatment well and Patient limited by " fatigue    Patient left up in chair with all lines intact, chair alarm on and nursing present/ notified.    Assessment:  Forest Villanueva Jr. is a 71 y.o. male with a medical diagnosis of <principal problem not specified>. He presents with impaired functional mobility/independence 2' weakness, R hemiparesis, decreased balance/stability, impaired motor control/planning, decreased endurance/activity tolerance and decreased safety awareness. Pt continues to require increased time to complete all tasks 2' slow pace and delayed processing.       Rehab potential is good.      Activity tolerance: Good    GOALS:   Physical Therapy Goals        Problem: Physical Therapy Goal    Goal Priority Disciplines Outcome Goal Variances Interventions   Physical Therapy Goal     PT/OT, PT Ongoing (interventions implemented as appropriate)     Description:  Goals to be met by: 3/17/2017     Patient will increase functional independence with mobility by performin). Supine to sit with Modified [place]  2). Sit to supine with Modified [place]  3). Sit to stand transfer with Stand-by Assistance  4). Bed to chair transfer with Stand-by Assistance   5). Gait  x > 150 feet with Stand-by Assistance   6). Wheelchair propulsion x > 200 feet with Modified [place]               PLAN:    Patient to be seen daily  to address the above listed problems via gait training, therapeutic activities, therapeutic exercises, neuromuscular re-education, wheelchair management/training  Plan of Care expires: 17  Plan of Care reviewed with: patient         Arleen Bryant, PTA 2017

## 2017-02-24 NOTE — PLAN OF CARE
Problem: SLP Goal  Goal: SLP Goal  Short Term Goals modified 2/23/2017:  1. Patient will state compensatory strategies for oral clearance/safe swallow strategies and demonstrate use of strategies MIN A  2. Patient will tolerate Level II Dysphagia DIet (finely chopped meats) with NECTAR-thickened liquids w/o overt S/S aspiration, MIN A, to improve swallow  3. Patient will complete OMEs x20 ea, MIN A, to improve R lingual/labial coordination and strength  4. Patient will attend to items on R side of tray with MIN A for demonstrated use of compensatory strategies for visiospatial skills  5. Patient will name 8 - 10 items/minute in a concrete category, MIN A, to improve word-finding and response time   6. Patient will demo clear speech strategies at the conversational level 90% of time or more, MIN A, to improve speech  7. Patient will recall 3/3 related items for imm, and post 3 minute filled delay, MIN A, to improve memory  8. NEW: Patient will tolerate trials of thin liquids w/o overt S/S aspiration, MIN A for use of safe swallow strategies, to improve swallow   Long Term Goals:  1. Patient will tolerate least restrictive diet without S/S aspiration, and good oral clearance, Supervision  2. Educate Patient and family on POC and compensatory strategies for safe swallow   3. Patient will use functional memory strategies to recall events of the day and safely complete ADLs, with Supervision   Outcome: Ongoing (interventions implemented as appropriate)  Patient named an average of 9 items in a given concrete category given extra time and cues for mental endurance.  Patient tolerated thin liquid trials with cough 1 of 4 trial sips. Cough occurred on final and largest sip requiring several swallows to clear.  Patient attended to items during scanning task with MOD assist.  MOCA initiated as reassessment.

## 2017-02-24 NOTE — ASSESSMENT & PLAN NOTE
Nutrition Problem:   Mild Malnutrition    % Intake of Estimated Energy Needs: 25 - 50 %  % Meal Intake: 50%  Malnutrition Reason: Other: BMI <23 in 65 yo (22.9 BMI)  Moderate Weight Loss Acute Illness/Injury: Other: na  Moderate Weight Loss Social/Environmental: Other: na  Moderate Weight Loss Chronic Illness: Other: na    Etiology/Related to:   Limited intake AEB meal intake 50%    Treatment Strategy:   1) Continue cardiac diet 2) Add Boost Plus with meals    Nutrition Diagnosis Status:   New

## 2017-02-24 NOTE — PLAN OF CARE
Problem: Nutrition, Imbalanced: Inadequate Oral Intake (Adult)  Goal: Identify Related Risk Factors and Signs and Symptoms  Related risk factors and signs and symptoms are identified upon initiation of Human Response Clinical Practice Guideline (CPG)   Outcome: Ongoing (interventions implemented as appropriate)  Pt alert and oriented. Eats about 50% of meals. Nectar thick liquids. Aspiration precautions. Pt at nursing station with family

## 2017-02-24 NOTE — PT/OT/SLP PROGRESS
Occupational Therapy  Treatment    Forest Villanueva Jr.   MRN: [id number]   Admitting Diagnosis: CVA    OT Date of Treatment: 17   Total Time (min): 75 min      Billable Minutes:  Self Care/Home Management 20 and Therapeutic Exercise 55  Total Minutes: 75    General Precautions: Standard, fall, aspiration, pacemaker, nectar thick  Do you have any cultural, spiritual, Restorationism conflicts, given your current situation?: Spiritism    Subjective:  Communicated with nursing prior to session.  Cooperative.    Pain Ratin/10     Objective:   Patient was found sitting up in his wheelchair, dressed and ready.  Light grooming done at sink side with NASH supervising.  Patient was directed with extended wheelchair training, self propelling himself from his room to the therapy gym with guidance/escort.  Challenged patient do doing a table top activity to incorporate BUE and crossing mid line: Graded Clothes pins, all colors, grabbing, placing, and pinching done at various angles/sides.  Instructed patient with BUE with bilat melissa for 4 sets of 5 mins each with light resistance and rest breaks given between sets,  Patient was left seated in his wheelchair next to the nursing station to wait for PT staff.    Grooming:  Patient peformed hand washing with Supervision or Set-up Assistance at sitting at sink.  Patient performed face washing with Supervision or Set-up Assistance at sitting at sink.  Patient performed oral hygeine with Stand-by Assistance at sitting at sink.  Patient performe hair grooming with Stand-by Assistance at sitting at sink.        Patient left up in chair with chair alarm on and nursing/PT staff notified    ASSESSMENT:  Forest Villanueva Jr. is a 71 y.o. male with a medical diagnosis of CVA  and presents with good treatment tolerance.      GOALS:   Occupational Therapy Goals        Problem: Occupational Therapy Goal    Goal Priority Disciplines Outcome Interventions   Occupational Therapy Goal     OT, PT/OT  Ongoing (interventions implemented as appropriate)    Description:  Goals to be met by: 3/14/17     Patient will increase functional independence with ADLs by performing:    Feeding with Set-up Assistance.  UE Dressing with Set-up Assistance.  LE Dressing with Set-up Assistance.  Grooming while seated with Set-up Assistance.  Toileting from toilet with Set-up Assistance for hygiene and clothing management.   Bathing from  shower chair/bench with Set-up Assistance.  Shower transfer with Supervision.  Toilet transfer to toilet with Supervision.                Plan:  Cont POC.  Patient to be seen 6 x/week to address the above listed problems via self-care/home management, community/work re-entry, therapeutic activities, therapeutic exercises, therapeutic groups, neuromuscular re-education, cognitive retraining, sensory integration, wheelchair management/training  Plan of Care expires: 03/14/17  Plan of Care reviewed with: patient         Greg SEBASTIAN Baig  02/24/2017

## 2017-02-24 NOTE — PLAN OF CARE
Problem: Nutrition, Imbalanced: Inadequate Oral Intake (Adult)  Goal: Identify Related Risk Factors and Signs and Symptoms  Related risk factors and signs and symptoms are identified upon initiation of Human Response Clinical Practice Guideline (CPG)  Recommendation/Intervention: 1) Continuer Cardiac diet with texture per SLP recs 2) Boost PLus with meals  Goals: 1) Meal intake 75% 2) Utilize Boost  Nutrition Goal Status: new  Communication of RD Recs: (sticky note, second sign)

## 2017-02-24 NOTE — PLAN OF CARE
Problem: Patient Care Overview  Goal: Plan of Care Review  Outcome: Ongoing (interventions implemented as appropriate)  bp med held lopressor for low heart rate. 50 bpm.  Vs stable 132/77. No c/o pain. Has been dry on rounds. Encouraging to use urinal and to call staff for assist. Able to turn self in bed. Cont plan of care.

## 2017-02-25 PROCEDURE — 97530 THERAPEUTIC ACTIVITIES: CPT

## 2017-02-25 PROCEDURE — 25000003 PHARM REV CODE 250: Performed by: PHYSICAL MEDICINE & REHABILITATION

## 2017-02-25 PROCEDURE — 63700000 PHARM REV CODE 250 ALT 637 W/O HCPCS: Performed by: PHYSICAL MEDICINE & REHABILITATION

## 2017-02-25 PROCEDURE — 12800000 HC REHAB SEMI-PRIVATE ROOM

## 2017-02-25 PROCEDURE — 63600175 PHARM REV CODE 636 W HCPCS: Performed by: PHYSICAL MEDICINE & REHABILITATION

## 2017-02-25 RX ADMIN — BRIMONIDINE TARTRATE 1 DROP: 1 SOLUTION/ DROPS OPHTHALMIC at 09:02

## 2017-02-25 RX ADMIN — DOCUSATE SODIUM 100 MG: 100 CAPSULE, LIQUID FILLED ORAL at 09:02

## 2017-02-25 RX ADMIN — LATANOPROST 1 DROP: 50 SOLUTION OPHTHALMIC at 09:02

## 2017-02-25 RX ADMIN — DORZOLAMIDE HYDROCHLORIDE AND TIMOLOL MALEATE 1 DROP: 20; 5 SOLUTION/ DROPS OPHTHALMIC at 09:02

## 2017-02-25 RX ADMIN — ASPIRIN 81 MG: 81 TABLET, COATED ORAL at 09:02

## 2017-02-25 RX ADMIN — ENOXAPARIN SODIUM 40 MG: 100 INJECTION SUBCUTANEOUS at 12:02

## 2017-02-25 RX ADMIN — CLOPIDOGREL BISULFATE 75 MG: 75 TABLET ORAL at 09:02

## 2017-02-25 NOTE — PROGRESS NOTES
REHAB FOLLOWUP NOTE    CHIEF COMPLAINT:  New onset left-sided CVA with right-sided weakness and   dysarthria for inpatient rehab.    HISTORY OF PRESENT ILLNESS:  A 71-year-old gentleman with past medical history   significant for CVA, hypertension, experienced some right-sided weakness, did   have a neuro workup done consistent with a new onset left-sided CVA with right   hemiparesis.  The patient also with history of glaucoma, coronary artery   disease, status post CABG, pacemaker in the past and needing a battery change.    PHYSICAL EXAMINATION:  GENERAL:  The patient on exam today is alert.  VITAL SIGNS:  He is afebrile.  His blood pressure is 128/83.  LUNGS:  Clear to auscultation.  HEART:  With a regular rate and rhythm.  CHEST:  Chest wall pacer site clean and dry.  GENITOURINARY:  He has been continent of bladder and bowel.  EXTREMITIES:  With functional active to passive range of motion with a fair   motor strength throughout.  No edema or calf tenderness noted.    Will check his electrolytes on Monday.    IMPRESSION:  1.  New onset left-sided CVA with right hemiparesis.  2.  History of old CVA without any residual weakness, history of hypertension,   history of coronary artery disease with CABG in the past, history of pacemaker   implant.    RECOMMENDATIONS:  Continue with the current PT/OT, speech and nursing care.          /lauri [id number] ludwig(s)        GISELLE/  dd: 02/25/2017 09:45:03 (CST)  td: 02/25/2017 13:08:00 (CST)  Doc ID   #[id number]  Job ID #[id number]    CC:     [id number]

## 2017-02-25 NOTE — PT/OT/SLP PROGRESS
Physical Therapy         Treatment        Forest Villanueva Jr.   MRN: [id number]     PT Received On: 17  Total Time (min): 30        Billable Minutes:  Therapeutic Activity 30  Total Minutes: 30    Treatment Type: Treatment  PT/PTA: PT     PTA Visit Number: 0       General Precautions: Standard, fall  Orthopedic Precautions: Orthopedic Precautions : N/A        Subjective:  Pain Ratin/10              Pain Rating Post-Intervention: 0/10    Objective:  Patient found supine in bed.      Functional Mobility:  Bed Mobility:   Supine to sit: Contact Guard Assistance   Sit to supine: Contact Guard Assistance     Transfer Training:  Sit to stand:Contact Guard Assistance with Rolling Walker  x 4  Bed <> Chair:  Stand Pivot with Minimal Assistance with No Assistive Device to/from bed    Wheelchair Trainin' x 2 with SBA and cues    Gait Trainin' x 2 with RW with CG/SBA and cues         Activity Tolerance:  Patient tolerated treatment well    Patient left supine with call button in reach and bed alarm on.    Assessment:  Forest Villanueva Jr. is a 71 y.o. male     Rehab potential is good.    Activity tolerance: Good    GOALS:   Physical Therapy Goals        Problem: Physical Therapy Goal    Goal Priority Disciplines Outcome Goal Variances Interventions   Physical Therapy Goal     PT/OT, PT Ongoing (interventions implemented as appropriate)     Description:  Goals to be met by: 3/17/2017     Patient will increase functional independence with mobility by performin). Supine to sit with Modified [place]  2). Sit to supine with Modified [place]  3). Sit to stand transfer with Stand-by Assistance  4). Bed to chair transfer with Stand-by Assistance   5). Gait  x > 150 feet with Stand-by Assistance   6). Wheelchair propulsion x > 200 feet with Modified [place]               PLAN:    Patient to be seen daily  to address the above listed problems via gait training, therapeutic activities, therapeutic  exercises, neuromuscular re-education, wheelchair management/training  Plan of Care expires: 03/17/17  Plan of Care reviewed with: patient         Christ T Chapo, PT 2/25/2017

## 2017-02-25 NOTE — PLAN OF CARE
Problem: Patient Care Overview  Goal: Plan of Care Review  Outcome: Ongoing (interventions implemented as appropriate)  Patient awake/alert. No c/o pain this shift. Incontinent at times. Remains free from falls. Plan of care continued

## 2017-02-26 PROCEDURE — 25000003 PHARM REV CODE 250: Performed by: PHYSICAL MEDICINE & REHABILITATION

## 2017-02-26 PROCEDURE — 12800000 HC REHAB SEMI-PRIVATE ROOM

## 2017-02-26 PROCEDURE — 63700000 PHARM REV CODE 250 ALT 637 W/O HCPCS: Performed by: PHYSICAL MEDICINE & REHABILITATION

## 2017-02-26 PROCEDURE — 63600175 PHARM REV CODE 636 W HCPCS: Performed by: PHYSICAL MEDICINE & REHABILITATION

## 2017-02-26 PROCEDURE — 97530 THERAPEUTIC ACTIVITIES: CPT

## 2017-02-26 RX ADMIN — BRIMONIDINE TARTRATE 1 DROP: 1 SOLUTION/ DROPS OPHTHALMIC at 08:02

## 2017-02-26 RX ADMIN — DORZOLAMIDE HYDROCHLORIDE AND TIMOLOL MALEATE 1 DROP: 20; 5 SOLUTION/ DROPS OPHTHALMIC at 08:02

## 2017-02-26 RX ADMIN — DOCUSATE SODIUM 100 MG: 100 CAPSULE, LIQUID FILLED ORAL at 08:02

## 2017-02-26 RX ADMIN — LATANOPROST 1 DROP: 50 SOLUTION OPHTHALMIC at 08:02

## 2017-02-26 RX ADMIN — ENOXAPARIN SODIUM 40 MG: 100 INJECTION SUBCUTANEOUS at 02:02

## 2017-02-26 RX ADMIN — ASPIRIN 81 MG: 81 TABLET, COATED ORAL at 08:02

## 2017-02-26 RX ADMIN — CLOPIDOGREL BISULFATE 75 MG: 75 TABLET ORAL at 08:02

## 2017-02-26 NOTE — PT/OT/SLP PROGRESS
Physical Therapy         Treatment        Forest Villanueva Jr.   MRN: [id number]     PT Received On: 17  Total Time (min): 30        Billable Minutes:  Therapeutic Activity 30  Total Minutes: 30    Treatment Type: Treatment  PT/PTA: PT     PTA Visit Number: 0       General Precautions: Standard, fall  Orthopedic Precautions: Orthopedic Precautions : N/A        Subjective:  Pain Ratin/10              Pain Rating Post-Intervention: 0/10    Objective:  Patient found seated in w/c.      Functional Mobility:  Transfer Training:  Sit to stand:Contact Guard Assistance with Rolling Walker  x 2    Wheelchair Trainin' x 1, 100' x 1, (slow)with SBA and cues    Gait Trainin' (slow) with RW with CGA and cues    Additional Treatment:  Reassessed function and LE strength.    Activity Tolerance:  Patient tolerated treatment well    Patient left up in chair with at nurses station..    Assessment:  Forest Villanueva Jr. is a 71 y.o. male     Rehab potential is good.    Activity tolerance: Fair (slow, limited endurance, fatigues easily)      GOALS:   Physical Therapy Goals        Problem: Physical Therapy Goal    Goal Priority Disciplines Outcome Goal Variances Interventions   Physical Therapy Goal     PT/OT, PT Ongoing (interventions implemented as appropriate)     Description:  Goals to be met by: 3/17/2017     Patient will increase functional independence with mobility by performin). Supine to sit with Modified [place]  2). Sit to supine with Modified [place]  3). Sit to stand transfer with Stand-by Assistance  4). Bed to chair transfer with Stand-by Assistance   5). Gait  x > 150 feet with Stand-by Assistance   6). Wheelchair propulsion x > 200 feet with Modified [place]               PLAN:    Patient to be seen daily  to address the above listed problems via gait training, therapeutic activities, therapeutic exercises, neuromuscular re-education, wheelchair management/training  Plan of Care  expires: 03/17/17  Plan of Care reviewed with: patient         Chrits FELIX Chapo, PT 2/26/2017

## 2017-02-27 LAB
ANION GAP SERPL CALC-SCNC: 9 MMOL/L
BUN SERPL-MCNC: 20 MG/DL
CALCIUM SERPL-MCNC: 9.5 MG/DL
CHLORIDE SERPL-SCNC: 108 MMOL/L
CO2 SERPL-SCNC: 25 MMOL/L
CREAT SERPL-MCNC: 1.1 MG/DL
EST. GFR  (AFRICAN AMERICAN): >60 ML/MIN/1.73 M^2
EST. GFR  (NON AFRICAN AMERICAN): >60 ML/MIN/1.73 M^2
GLUCOSE SERPL-MCNC: 97 MG/DL
POTASSIUM SERPL-SCNC: 4.2 MMOL/L
SODIUM SERPL-SCNC: 142 MMOL/L

## 2017-02-27 PROCEDURE — 97535 SELF CARE MNGMENT TRAINING: CPT

## 2017-02-27 PROCEDURE — 97530 THERAPEUTIC ACTIVITIES: CPT

## 2017-02-27 PROCEDURE — 97110 THERAPEUTIC EXERCISES: CPT

## 2017-02-27 PROCEDURE — 63700000 PHARM REV CODE 250 ALT 637 W/O HCPCS: Performed by: PHYSICAL MEDICINE & REHABILITATION

## 2017-02-27 PROCEDURE — 97116 GAIT TRAINING THERAPY: CPT

## 2017-02-27 PROCEDURE — 12800000 HC REHAB SEMI-PRIVATE ROOM

## 2017-02-27 PROCEDURE — 97532 *HC SP COG SKL DEV EA 15 MIN: CPT

## 2017-02-27 PROCEDURE — 97542 WHEELCHAIR MNGMENT TRAINING: CPT

## 2017-02-27 PROCEDURE — 36415 COLL VENOUS BLD VENIPUNCTURE: CPT

## 2017-02-27 PROCEDURE — 80048 BASIC METABOLIC PNL TOTAL CA: CPT

## 2017-02-27 PROCEDURE — 63600175 PHARM REV CODE 636 W HCPCS: Performed by: PHYSICAL MEDICINE & REHABILITATION

## 2017-02-27 PROCEDURE — 25000003 PHARM REV CODE 250: Performed by: PHYSICAL MEDICINE & REHABILITATION

## 2017-02-27 RX ADMIN — DOCUSATE SODIUM 100 MG: 100 CAPSULE, LIQUID FILLED ORAL at 08:02

## 2017-02-27 RX ADMIN — CLOPIDOGREL BISULFATE 75 MG: 75 TABLET ORAL at 08:02

## 2017-02-27 RX ADMIN — ENOXAPARIN SODIUM 40 MG: 100 INJECTION SUBCUTANEOUS at 12:02

## 2017-02-27 RX ADMIN — LATANOPROST 1 DROP: 50 SOLUTION OPHTHALMIC at 09:02

## 2017-02-27 RX ADMIN — DORZOLAMIDE HYDROCHLORIDE AND TIMOLOL MALEATE 1 DROP: 20; 5 SOLUTION/ DROPS OPHTHALMIC at 09:02

## 2017-02-27 RX ADMIN — ASPIRIN 81 MG: 81 TABLET, COATED ORAL at 08:02

## 2017-02-27 RX ADMIN — DORZOLAMIDE HYDROCHLORIDE AND TIMOLOL MALEATE 1 DROP: 20; 5 SOLUTION/ DROPS OPHTHALMIC at 08:02

## 2017-02-27 RX ADMIN — BRIMONIDINE TARTRATE 1 DROP: 1 SOLUTION/ DROPS OPHTHALMIC at 09:02

## 2017-02-27 RX ADMIN — BRIMONIDINE TARTRATE 1 DROP: 1 SOLUTION/ DROPS OPHTHALMIC at 08:02

## 2017-02-27 NOTE — PT/OT/SLP PROGRESS
Physical Therapy         Treatment        Forest Villanueva Jr.   MRN: [id number]     PT Received On: 17  Total Time (min): 75        Billable Minutes:  Gait [id number], Therapeutic Activity 10, Therapeutic Exercise 45 and Train/Wheelchair Management 10  Total Minutes: 75    Treatment Type: Treatment  PT/PTA: PTA     PTA Visit Number: 1       General Precautions: Standard, fall, pacemaker  Orthopedic Precautions: Orthopedic Precautions : N/A   Braces: Braces: N/A         Subjective:  Communicated with nurse Chito prior to session.    Upon entering room, pt seemed upset with tears in his eyes. When asked pt what was wrong, he stated he had a bad lunch and was upset that he had to get a stomach shot during lunch. Nurse notified/aware.    Pain Ratin/10              Pain Rating Post-Intervention: 0/10    Objective:  Patient found supine in bed, with Patient found with: bed alarm    Functional Mobility:  Bed Mobility:   Supine to sit: Minimal Assistance   Sit to supine: Supervision or Set-up Assistance   Rolling: Supervision or Set-up Assistance   Scooting: Supervision or Set-up Assistance    Balance:   Static Stand: FAIR: Maintains without assist but unable to take challenges  Dynamic stand: FAIR: Needs CONTACT GUARD during gait    Transfer Training:  Sit to stand:Contact Guard Assistance with No Assistive Device, Rolling Walker and II bars x multiple trials through session  Bed <> Chair:  Stand Pivot with Contact Guard Assistance with No Assistive Device .    Wheelchair Training:  Pt propelled Standard wheelchair x 200 feet on Level tile with  Bilateral lower extremity with Supervision or Set-up Assistance. Increased time required 2' slow pace    Gait Trainin' with CGA using RW. Noted R LE fatigue and slight R knee buckling towards end of gait trail.    Additional Treatment:    Seated BLE therex: HR/TR, ankle df with blue tb, LAQ and marching with 2# B, hip abd and hs curls with blue tb, hip add ball  squeeze 3 x 10 reps each with a very slow pace     Standing on airex: calf raises, marching x 20 reps each    Side Stepping in II bars L/R 8' x 4  Tandem walking in II bars fwd/back 8' x 1     Activity Tolerance:  Patient tolerated treatment well and Patient limited by fatigue     Patient left supine with all lines intact, bed alarm on and nurse Chito notified.     Assessment:  Forest Villanueva Jr. is a 71 y.o. male with a medical diagnosis of <principal problem not specified>. He presents with impaired functional mobility/independence 2' weakness, R hemiparesis, decreased balance/stability, impaired motor control/planning, decreased endurance/activity tolerance and decreased safety awareness. Pt continues to require increased time to complete all tasks 2' slow pace and delayed processing. Pt tolerated all standing therex well.       Rehab potential is good.      Activity tolerance: Good      GOALS:   Physical Therapy Goals        Problem: Physical Therapy Goal    Goal Priority Disciplines Outcome Goal Variances Interventions   Physical Therapy Goal     PT/OT, PT Ongoing (interventions implemented as appropriate)     Description:  Goals to be met by: 3/17/2017     Patient will increase functional independence with mobility by performin). Supine to sit with Modified [place]  2). Sit to supine with Modified [place]  3). Sit to stand transfer with Stand-by Assistance  4). Bed to chair transfer with Stand-by Assistance   5). Gait  x > 150 feet with Stand-by Assistance   6). Wheelchair propulsion x > 200 feet with Modified [place]               PLAN:    Patient to be seen daily  to address the above listed problems via gait training, therapeutic activities, therapeutic exercises, neuromuscular re-education, wheelchair management/training  Plan of Care expires: 17  Plan of Care reviewed with: patient         Arleen Bryant, PTA 2017

## 2017-02-27 NOTE — PATIENT CARE CONFERENCE
Weekly Staffing Report      Date Admitted: 2/14/2017 :   Staffing Date: 2/27/2017     Patient Active Problem List   Diagnosis    CVA (cerebral vascular accident)    Mild malnutrition          Team Members Present:  Physician Team Member: Dr Garrido  Nursing Team Member: Moose Pickering RN  Case Management Team Member: Livier Coles CM/SW  PT Team Member: Christ Cowan PT  OT Team Member: Roseann Murphy OT  SLP Team Member: Swetha MURCIA    Nursing  Skin: Intact   Bowel: Continent  Bladder:continent  Last BM: 2-25-17  Diet: cardiac with boost & necter thick liquid  Appetite: good   Pain Level: 0/10    Physical Therapy  Supine to Sit:  contact guard  Sit to Stand: contact guard  Gait:  feet contact guard Rolling walker  Wheelchair Mobility: 200 feet standy by assistance  ROM: prom wfl  Stairs:6 four inch steps  minimal assist  Strength: right 4- to 4/5, left 4 to 4+/5    Occupational Therapy  Feeding: supervision  Grooming:supervision  UED: supervision  LED: minimal assist  Bathing:contact guard   Toileting:minimal assist  Toilet Transfer:  minimal assist  Tub Transfer:  minimal assist  Strength: left shoulder not tested due to pacemaker precaution , otherwise 3+. Right 3+ to 4-/5    Speech Therapy  Swallow: mild oral pharyngeal dysphasia   MMS: 11/30  Memory: moderate assist  Receptive Language: minimal assist  Expressive Language: minimal assist to sup   Problem solving: moderate assist            Summary of Progress:  Good     Barriers to Progress/Discharge:     Tolerates 3 hours of therapy: Yes    Comments:     Estimated Length of Stay: 3-8-17

## 2017-02-27 NOTE — PROGRESS NOTES
Progress Note  Physical Medicine & Rehab    Admit Date: 2/14/2017   LOS: 13 days     SUBJECTIVE:     Follow-up For:  Daily round     Review of Systems:  denies cp, sob or abdominla discomfort     OBJECTIVE:     Vital Signs (Most Recent)  Temp: 98 °F (36.7 °C) (02/27/17 0532)  Pulse: (!) 53 (02/27/17 0532)  Resp: 18 (02/27/17 0532)  BP: (!) 119/53 (02/27/17 0532)  SpO2: 99 % (02/27/17 0532)    Vital Signs Range (Last 24H):  Temp:  [98 °F (36.7 °C)]   Pulse:  [53-58]   Resp:  [18]   BP: (106-119)/(53-69)   SpO2:  [99 %]     I & O (Last 24H):  Intake/Output Summary (Last 24 hours) at 02/27/17 0816  Last data filed at 02/26/17 1800   Gross per 24 hour   Intake              960 ml   Output                0 ml   Net              960 ml     Physical Exam:  Calm, cooperative in no distress  Lung: CTA B  Heart: rrr no m  Abd: soft, NTND + BS  Ext: no edema  Skin: intact     Laboratory:  Recent Results (from the past 72 hour(s))   Basic metabolic panel    Collection Time: 02/27/17  5:50 AM   Result Value Ref Range    Sodium 142 136 - 145 mmol/L    Potassium 4.2 3.5 - 5.1 mmol/L    Chloride 108 95 - 110 mmol/L    CO2 25 23 - 29 mmol/L    Glucose 97 70 - 110 mg/dL    BUN, Bld 20 8 - 23 mg/dL    Creatinine 1.1 0.5 - 1.4 mg/dL    Calcium 9.5 8.7 - 10.5 mg/dL    Anion Gap 9 8 - 16 mmol/L    eGFR if African American >60 >60 mL/min/1.73 m^2    eGFR if non African American >60 >60 mL/min/1.73 m^2       Diagnostic Results:  no new imaging    ASSESSMENT/PLAN:     Active Hospital Problems    Diagnosis  POA    Mild malnutrition [E44.1]  Yes    CVA (cerebral vascular accident) [I63.9]  Yes      Resolved Hospital Problems    Diagnosis Date Resolved POA   No resolved problems to display.       CVA cont PT, OT, ST   Anticoagulation, cont asa & Plavix  DVT prophylaxis, cont sq Lovenox  HTN, vitals noted, cont current meds

## 2017-02-27 NOTE — PT/OT/SLP PROGRESS
Speech Language Pathology Treatment          Forest Villanueva Jr.   MRN: [id number]     Diet recommendations: Solid Diet Level: Finely chopped meat  Liquid Diet Level: Nectar Thick Assistance with meals, small bites/sips, alternate bites/sips, eliminate distractions    SLP Treatment Date: 02/27/17  Speech Start Time: 1420     Speech Stop Time: 1450     Speech Total (min): 30 min       TREATMENT BILLABLE MINUTES:  Speech Therapy Individual 30 and Total Time 30    General Precautions: Standard, aspiration, nectar thick, fall, vision impaired    Subjective:  Pt lethargic, but cooperative with ST.    Objective:   Patient found in bed in room. Patient found with: bed alarm HOB inclined to 90 degrees for therapy session.   Pt scored 11/30 on MOCA.     3. Patient will complete OMEs x20 ea, MIN A, to improve R lingual/labial coordination and strength  Pt completed labial and lingual strength and coordination exercises x 20 each, MIN A, with frequent encouragement.     5. Patient will name 8 - 10 items/minute in a concrete category, MIN A, to improve word-finding and response time   Pt named an average of 8 items across 3 concrete categories with MIN A.     6. Patient will demo clear speech strategies at the conversational level 90% of time or more, MIN A, to improve speech  Pt demonstrated clear speech 70% of the time at conversational level with Min A and setup of clear speech strategies.     Assessment:  Forest Villanueva Jr. is a 71 y.o. male with a SLP diagnosis of Oropharyngeal Dysphagia, Dysarthria and Cognitive-Linguistic Impairment. He presented with lethargy and low volume this service day. Pt scored 11/30 on the MOCA, which is consistent with his score from one week ago. Pt participated in therapy with fair effort and frequent redirection.     Diet recommendations:   Solid Diet Level: Finely chopped meat    Liquid Diet Level: Nectar Thick      Discharge recommendations: Discharge Facility/Level Of Care Needs: home      Goals:   SLP Goals        Problem: SLP Goal    Goal Priority Disciplines Outcome   SLP Goal     SLP Ongoing (interventions implemented as appropriate)   Description:  Short Term Goals modified 2/23/2017:  1. Patient will state compensatory strategies for oral clearance/safe swallow strategies and demonstrate use of strategies MIN A  2. Patient will tolerate Level II Dysphagia DIet (finely chopped meats) with NECTAR-thickened liquids w/o overt S/S aspiration, MIN A, to improve swallow  3. Patient will complete OMEs x20 ea, MIN A, to improve R lingual/labial coordination and strength  4. Patient will attend to items on R side of tray with MIN A for demonstrated use of compensatory strategies for visiospatial skills  5. Patient will name 8 - 10 items/minute in a concrete category, MIN A, to improve word-finding and response time   6. Patient will demo clear speech strategies at the conversational level 90% of time or more, MIN A, to improve speech  7. Patient will recall 3/3 related items for imm, and post 3 minute filled delay, MIN A, to improve memory  8. NEW: Patient will tolerate trials of thin liquids w/o overt S/S aspiration, MIN A for use of safe swallow strategies, to improve swallow   Long Term Goals:  1. Patient will tolerate least restrictive diet without S/S aspiration, and good oral clearance, Supervision  2. Educate Patient and family on POC and compensatory strategies for safe swallow   3. Patient will use functional memory strategies to recall events of the day and safely complete ADLs, with Supervision                    Plan: Continue POC  Patient to be seen Therapy Frequency: 5 x/week   Plan of Care Expires: 03/10/17  Plan of Care reviewed with: patient  SLP Follow-up?: Yes  SLP - Next Visit Date: 02/28/17       Leticia Woodward, Student Clinician    Swetha Clarke, MS, CCC/SLP  I certify that I was present in the room directing the student in service delivery and guiding them using my skilled  judgment. As the co-signing therapist I have reviewed the students documentation and am responsible for the treatment, assessment, and plan.         Swetha Clarke, CCC-SLP 2/27/2017

## 2017-02-27 NOTE — PROGRESS NOTES
Team conference attended and patient discussed. Spoke with patient to discuss treatment plan, goals and ELOS.  Spoke with sister to provide an update.   SW will follow and assist with discharge planning as needed.

## 2017-02-27 NOTE — PLAN OF CARE
Problem: Physical Therapy Goal  Goal: Physical Therapy Goal  Goals to be met by: 3/17/2017     Patient will increase functional independence with mobility by performin). Supine to sit with Modified [place]  2). Sit to supine with Modified [place]  3). Sit to stand transfer with Stand-by Assistance  4). Bed to chair transfer with Stand-by Assistance   5). Gait x > 150 feet with Stand-by Assistance   6). Wheelchair propulsion x > 200 feet with Modified [place]   Outcome: Ongoing (interventions implemented as appropriate)  PT for gait training, t/f training, w/c mobility and standing/seated BLE therex.

## 2017-02-27 NOTE — PT/OT/SLP PROGRESS
"Occupational Therapy  Treatment    Forest Villanueva Jr.   MRN: [id number]   Admitting Diagnosis: CVA    OT Date of Treatment: 17   Total Time (min): 75 min      Billable Minutes:  Self Care/Home Management 45, Therapeutic Activity 15 and Therapeutic Exercise 15  Total Minutes: 75    General Precautions: Standard, fall, pacemaker  Orthopedic Precautions: N/A  Braces: N/A    Do you have any cultural, spiritual, Taoism conflicts, given your current situation?: Mormon    Subjective:  Communicated with nurse prior to session.  "I'm so tired. I just want to relax today. I'm too tired to take a shower."     Pain Ratin/10              Pain Rating Post-Intervention: 0/10    Objective:  Patient found with:  (R sidelying sleeping with bed alarm on)    Functional Mobility:  Bed Mobility:   Supine to sit: Minimal Assistance    Transfer Training:   Sit to stand:Minimal Assistance with No Assistive Device verbal cues to adhere to pacemaker precautions  Bed <> Chair:  Stand Pivot with Minimal Assistance with No Assistive Device with verbal cues to adhere to pacemaker precautions    Grooming:  Set-up/Supervision to perform oral care, brush hair, and wash face    UE Dressing:  Supervision to don/doff t shirt and long sleeved pullover shirt from wheelchair level    LE Dressing:  Patient don/doffed socks with Supervision or Set-up Assistance, Patient don/doffed shoes with Supervision or Set-up Assistance, Patient don/doffed adult brief with Minimal Assistance and Patient don/doffed pants with Minimal Assistance to stand to manage clothing over hips    Toilet Training:  Use of urinal with Set up from bed level    Balance:   Static Sit: GOOD: Takes MODERATE challenges from all directions  Dynamic Sit:  GOOD: Maintains balance through MODERATE excursions of active trunk movement  Static Stand: FAIR+: Takes MINIMAL challenges from all directions    Additional Treatment:  -Wheelchair mobility > 50 ft with Supervision   -Graded " clothespins - (L) yellow/green resistance; (R) red, blue, black with Supervision to complete  -Removal/replacement of nuts/bolts and washers x 8 total alternating UE -completed with Min (A)    Patient left up in chair with call button in reach and nurse notified; seatbelt alarm intact; seated up in pt common area with multiple staff members present    ASSESSMENT:  Forest Villanueva Jr. is a 71 y.o. male with a medical diagnosis of CVA and presents with increased initiation of verbal communication during today's session. Patient should continue to benefit from skilled OT services per est POC to increase functional independence, mobility, and safety.    Rehab potential is good    Activity tolerance: Good    Discharge recommendations: home     Equipment recommendations:  (TBD; likely BSC and transfer tub bench)     GOALS:   Occupational Therapy Goals        Problem: Occupational Therapy Goal    Goal Priority Disciplines Outcome Interventions   Occupational Therapy Goal     OT, PT/OT Ongoing (interventions implemented as appropriate)    Description:  Goals to be met by: 3/14/17     Patient will increase functional independence with ADLs by performing:    Feeding with Set-up Assistance.  UE Dressing with Set-up Assistance.  LE Dressing with Set-up Assistance.  Grooming while seated with Set-up Assistance.  Toileting from toilet with Set-up Assistance for hygiene and clothing management.   Bathing from  shower chair/bench with Set-up Assistance.  Shower transfer with Supervision.  Toilet transfer to toilet with Supervision.                Plan:  Patient to be seen 6 x/week to address the above listed problems via self-care/home management, community/work re-entry, therapeutic activities, therapeutic exercises, therapeutic groups, neuromuscular re-education, cognitive retraining, sensory integration, wheelchair management/training  Plan of Care expires: 03/14/17  Plan of Care reviewed with: patient         Roseann Murphy  MORGAN GARRETT  02/27/2017

## 2017-02-27 NOTE — PLAN OF CARE
Problem: Fall Risk (Adult)  Goal: Absence of Falls  Patient will demonstrate the desired outcomes by discharge/transition of care.   Outcome: Ongoing (interventions implemented as appropriate)  Pt free from falls. Reminded to use call light .

## 2017-02-28 PROCEDURE — 97542 WHEELCHAIR MNGMENT TRAINING: CPT

## 2017-02-28 PROCEDURE — 97110 THERAPEUTIC EXERCISES: CPT

## 2017-02-28 PROCEDURE — 63700000 PHARM REV CODE 250 ALT 637 W/O HCPCS: Performed by: PHYSICAL MEDICINE & REHABILITATION

## 2017-02-28 PROCEDURE — 92507 TX SP LANG VOICE COMM INDIV: CPT

## 2017-02-28 PROCEDURE — 97535 SELF CARE MNGMENT TRAINING: CPT

## 2017-02-28 PROCEDURE — 12800000 HC REHAB SEMI-PRIVATE ROOM

## 2017-02-28 PROCEDURE — 63600175 PHARM REV CODE 636 W HCPCS: Performed by: PHYSICAL MEDICINE & REHABILITATION

## 2017-02-28 PROCEDURE — 97116 GAIT TRAINING THERAPY: CPT

## 2017-02-28 PROCEDURE — 25000003 PHARM REV CODE 250: Performed by: PHYSICAL MEDICINE & REHABILITATION

## 2017-02-28 PROCEDURE — 97532 *HC SP COG SKL DEV EA 15 MIN: CPT

## 2017-02-28 RX ADMIN — ENOXAPARIN SODIUM 40 MG: 100 INJECTION SUBCUTANEOUS at 02:02

## 2017-02-28 RX ADMIN — LISINOPRIL 2.5 MG: 2.5 TABLET ORAL at 10:02

## 2017-02-28 RX ADMIN — DORZOLAMIDE HYDROCHLORIDE AND TIMOLOL MALEATE 1 DROP: 20; 5 SOLUTION/ DROPS OPHTHALMIC at 09:02

## 2017-02-28 RX ADMIN — METOPROLOL TARTRATE 25 MG: 25 TABLET ORAL at 10:02

## 2017-02-28 RX ADMIN — CLOPIDOGREL BISULFATE 75 MG: 75 TABLET ORAL at 10:02

## 2017-02-28 RX ADMIN — DORZOLAMIDE HYDROCHLORIDE AND TIMOLOL MALEATE 1 DROP: 20; 5 SOLUTION/ DROPS OPHTHALMIC at 10:02

## 2017-02-28 RX ADMIN — DOCUSATE SODIUM 100 MG: 100 CAPSULE, LIQUID FILLED ORAL at 10:02

## 2017-02-28 RX ADMIN — LATANOPROST 1 DROP: 50 SOLUTION OPHTHALMIC at 09:02

## 2017-02-28 RX ADMIN — ASPIRIN 81 MG: 81 TABLET, COATED ORAL at 10:02

## 2017-02-28 RX ADMIN — BRIMONIDINE TARTRATE 1 DROP: 1 SOLUTION/ DROPS OPHTHALMIC at 09:02

## 2017-02-28 RX ADMIN — BRIMONIDINE TARTRATE 1 DROP: 1 SOLUTION/ DROPS OPHTHALMIC at 10:02

## 2017-02-28 NOTE — PLAN OF CARE
Problem: SLP Goal  Goal: SLP Goal  Short Term Goals modified 2/23/2017:  1. Patient will state compensatory strategies for oral clearance/safe swallow strategies and demonstrate use of strategies MIN A  2. Patient will tolerate Level II Dysphagia DIet (finely chopped meats) with NECTAR-thickened liquids w/o overt S/S aspiration, MIN A, to improve swallow  3. Patient will complete OMEs x20 ea, MIN A, to improve R lingual/labial coordination and strength  4. Patient will attend to items on R side of tray with MIN A for demonstrated use of compensatory strategies for visiospatial skills  5. Patient will name 8 - 10 items/minute in a concrete category, MIN A, to improve word-finding and response time   6. Patient will demo clear speech strategies at the conversational level 90% of time or more, MIN A, to improve speech  7. Patient will recall 3/3 related items for imm, and post 3 minute filled delay, MIN A, to improve memory  8. NEW: Patient will tolerate trials of thin liquids w/o overt S/S aspiration, MIN A for use of safe swallow strategies, to improve swallow   Long Term Goals:  1. Patient will tolerate least restrictive diet without S/S aspiration, and good oral clearance, Supervision  2. Educate Patient and family on POC and compensatory strategies for safe swallow   3. Patient will use functional memory strategies to recall events of the day and safely complete ADLs, with Supervision      Pt demonstrated fair effort today. He tolerated nectar thick liquids via cup t/o session with no overt s/s penetration/aspiraiton. He recalled 2/3 unrelated words after 3 minutes, independently; 3/3 with MIN A. He was able to recall same three unrelated words at end of session independently. Pt named 6, 5 and 9 items in concrete cateory across 3 attempts despite MOD A.      Swetha Clarke, MS, CCC/SLP 2/28/2017

## 2017-02-28 NOTE — PLAN OF CARE
Problem: Patient Care Overview  Goal: Plan of Care Review  Outcome: Ongoing (interventions implemented as appropriate)  Alert and oriented, quiet, flat affect, denies pain, healing incision w/steri strips to left chest wall, continent of B&B occasional incontinence of bladder

## 2017-02-28 NOTE — PLAN OF CARE
Problem: Physical Therapy Goal  Goal: Physical Therapy Goal  Goals to be met by: 3/17/2017     Patient will increase functional independence with mobility by performin). Supine to sit with Modified [place]  2). Sit to supine with Modified [place]  3). Sit to stand transfer with Stand-by Assistance  4). Bed to chair transfer with Stand-by Assistance   5). Gait x > 150 feet with Stand-by Assistance   6). Wheelchair propulsion x > 200 feet with Modified [place]   Outcome: Ongoing (interventions implemented as appropriate)  Patient easily fatigued with all activities.

## 2017-02-28 NOTE — PT/OT/SLP PROGRESS
Occupational Therapy  Treatment    Forest Villanueva Jr.   MRN: [id number]   Admitting Diagnosis: CVA    OT Date of Treatment: 17   Total Time (min): 60 min      Billable Minutes:  Self Care/Home Management 15 and Therapeutic Exercise 45  Total Minutes: 60    General Precautions: Standard, fall, aspiration, nectar thick, vision impaired    Do you have any cultural, spiritual, Restorationism conflicts, given your current situation?: Islam    Subjective:  Communicated with nursing prior to session.  Cooperative, but he stated feeling more fatigued today, then other days, slower moving today.    Pain Ratin/10     Objective:    Light in room grooming done with staff supervision and set up.  Directed patient with self propelling of his wheelchair to and from the main therapy gym.  Instructed patient with BUE exercising at table top: bilat melissa used for 4 sets of 5 mins each with 4lbs in box and several rest breaks needed by patient.    Grooming:  Patient peformed hand washing with Supervision or Set-up Assistance at sitting at sink.  Patient performed face washing with Supervision or Set-up Assistance at sitting at sink.  Patient performed oral hygeine with Activity did not occur at sitting at sink.  Patient performe hair grooming with Supervision or Set-up Assistance at sitting at sink.      Patient left up in chair with all lines intact, call button in reach, chair alarm on and nursing notified    ASSESSMENT:  Forest Villanueva Jr. is a 71 y.o. male with a medical diagnosis of CVA and presents with decreased treatment tolerance today due to increased fatigue.      GOALS:   Occupational Therapy Goals        Problem: Occupational Therapy Goal    Goal Priority Disciplines Outcome Interventions   Occupational Therapy Goal     OT, PT/OT Ongoing (interventions implemented as appropriate)    Description:  Goals to be met by: 3/14/17     Patient will increase functional independence with ADLs by performing:    Feeding with  Set-up Assistance.  UE Dressing with Set-up Assistance.  LE Dressing with Set-up Assistance.  Grooming while seated with Set-up Assistance.  Toileting from toilet with Set-up Assistance for hygiene and clothing management.   Bathing from  shower chair/bench with Set-up Assistance.  Shower transfer with Supervision.  Toilet transfer to toilet with Supervision.                Plan:  Cont POC.  Patient to be seen 6 x/week to address the above listed problems via self-care/home management, community/work re-entry, therapeutic activities, therapeutic exercises, therapeutic groups, neuromuscular re-education, cognitive retraining, sensory integration, wheelchair management/training  Plan of Care expires: 03/14/17  Plan of Care reviewed with: patient         Greg Jovanni, SEBASTIAN  02/28/2017

## 2017-02-28 NOTE — PLAN OF CARE
Problem: Patient Care Overview  Goal: Plan of Care Review  Outcome: Ongoing (interventions implemented as appropriate)  AAO x3 vss denies pain no acute distress

## 2017-02-28 NOTE — PT/OT/SLP PROGRESS
Physical Therapy         Treatment        Forest Villanueva Jr.   MRN: [id number]     PT Received On: 02/28/17  Total Time (min): 90        Billable Minutes:  Gait Training 15, Therapeutic Exercise 55 and Train/Wheelchair Management 20  Total Minutes: 90    Treatment Type: Treatment  PT/PTA: PTA     PTA Visit Number: 2       General Precautions: Standard, fall, pacemaker  Orthopedic Precautions: Orthopedic Precautions : N/A   Braces:           Subjective:  Communicated with nurse prior to session.                        Objective:  Patient found supine in bed     Functional Mobility:  Bed Mobility:   Supine to sit: Standby Assistance   Sit to supine: Activity did not occur   Rolling: Standby Assistance   Scooting: Standby Assistance    Balance:   Static Sit: FAIR+: Able to take MINIMAL challenges from all directions  Dynamic Sit:  FAIR+: Maintains balance through MINIMAL excursions of active trunk motion  Static Stand: FAIR: Maintains without assist but unable to take challenges  Dynamic stand: FAIR: Needs CONTACT GUARD during gait with RW    Transfer Training:  Sit to stand:Contact Guard Assistance with Rolling Walker x2  Toilet Transfer:  Pt Stand Pivot with Contact Guard Assistance with Grab bars x2    Wheelchair Training:  Pt propelled Standard wheelchair x [id number] feet on Level tile with  Bilateral lower extremity with Stand-by Assistance.     Gait Training:  Patient gait trained FWB/WBAT: bilateral lower extremity 2x100  feet on level tile with Rolling Walker with Contact Guard Assistance.  Pt with demonstarting a  swing-through gait with decreased delmar, increased time in double stance, decreased velocity of limb motion, decreased step length, decreased stride length, decreased swing-to-stance ratio, decreased toe-to-floor clearance and decreased weight-shifting ability.Impairments contributing to gait deviations include impaired balance and decreased strength    Additional Treatment:  SciFit Lv1 15' with UE's.  Sit ex 3x10 with 2# B: AP, LAQ, March, Hip abd, ball squeeze; Stand with RW x20: HR, march. Assisted to toilet, needed assistance for clean up.    Activity Tolerance:  Patient limited by fatigue    Patient left up in chair with call button in reach, chair alarm on and nurse notified.    Assessment:  Forest Villanueva Jr. is a 71 y.o. male with a medical diagnosis of <principal problem not specified>. He presents with needing VC to increase motivation.    Rehab potential is .    Activity tolerance: Fair    Discharge recommendations:       Equipment recommendations:       GOALS:   Physical Therapy Goals        Problem: Physical Therapy Goal    Goal Priority Disciplines Outcome Goal Variances Interventions   Physical Therapy Goal     PT/OT, PT Ongoing (interventions implemented as appropriate)     Description:  Goals to be met by: 3/17/2017     Patient will increase functional independence with mobility by performin). Supine to sit with Modified [place]  2). Sit to supine with Modified [place]  3). Sit to stand transfer with Stand-by Assistance  4). Bed to chair transfer with Stand-by Assistance   5). Gait  x > 150 feet with Stand-by Assistance   6). Wheelchair propulsion x > 200 feet with Modified [place]               PLAN:    Patient to be seen daily  to address the above listed problems via gait training, therapeutic activities, therapeutic exercises, neuromuscular re-education, wheelchair management/training  Plan of Care expires: 17  Plan of Care reviewed with: patient         Thais LUIS ANTONIO Landin, PTA 2017

## 2017-03-01 PROCEDURE — 97532 *HC SP COG SKL DEV EA 15 MIN: CPT

## 2017-03-01 PROCEDURE — 97110 THERAPEUTIC EXERCISES: CPT

## 2017-03-01 PROCEDURE — 97802 MEDICAL NUTRITION INDIV IN: CPT

## 2017-03-01 PROCEDURE — 97116 GAIT TRAINING THERAPY: CPT

## 2017-03-01 PROCEDURE — 97542 WHEELCHAIR MNGMENT TRAINING: CPT

## 2017-03-01 PROCEDURE — 25000003 PHARM REV CODE 250: Performed by: PHYSICAL MEDICINE & REHABILITATION

## 2017-03-01 PROCEDURE — 92526 ORAL FUNCTION THERAPY: CPT

## 2017-03-01 PROCEDURE — 63700000 PHARM REV CODE 250 ALT 637 W/O HCPCS: Performed by: PHYSICAL MEDICINE & REHABILITATION

## 2017-03-01 PROCEDURE — 97535 SELF CARE MNGMENT TRAINING: CPT

## 2017-03-01 PROCEDURE — 63600175 PHARM REV CODE 636 W HCPCS: Performed by: PHYSICAL MEDICINE & REHABILITATION

## 2017-03-01 PROCEDURE — 12800000 HC REHAB SEMI-PRIVATE ROOM

## 2017-03-01 RX ADMIN — DORZOLAMIDE HYDROCHLORIDE AND TIMOLOL MALEATE 1 DROP: 20; 5 SOLUTION/ DROPS OPHTHALMIC at 09:03

## 2017-03-01 RX ADMIN — BRIMONIDINE TARTRATE 1 DROP: 1 SOLUTION/ DROPS OPHTHALMIC at 09:03

## 2017-03-01 RX ADMIN — LATANOPROST 1 DROP: 50 SOLUTION OPHTHALMIC at 09:03

## 2017-03-01 RX ADMIN — DORZOLAMIDE HYDROCHLORIDE AND TIMOLOL MALEATE 1 DROP: 20; 5 SOLUTION/ DROPS OPHTHALMIC at 08:03

## 2017-03-01 RX ADMIN — ENOXAPARIN SODIUM 40 MG: 100 INJECTION SUBCUTANEOUS at 12:03

## 2017-03-01 RX ADMIN — METOPROLOL TARTRATE 25 MG: 25 TABLET ORAL at 12:03

## 2017-03-01 RX ADMIN — BRIMONIDINE TARTRATE 1 DROP: 1 SOLUTION/ DROPS OPHTHALMIC at 08:03

## 2017-03-01 RX ADMIN — DOCUSATE SODIUM 100 MG: 100 CAPSULE, LIQUID FILLED ORAL at 08:03

## 2017-03-01 RX ADMIN — ASPIRIN 81 MG: 81 TABLET, COATED ORAL at 08:03

## 2017-03-01 RX ADMIN — METOPROLOL TARTRATE 25 MG: 25 TABLET ORAL at 09:03

## 2017-03-01 RX ADMIN — CLOPIDOGREL BISULFATE 75 MG: 75 TABLET ORAL at 08:03

## 2017-03-01 NOTE — PLAN OF CARE
Problem: Patient Care Overview  Goal: Plan of Care Review  Outcome: Ongoing (interventions implemented as appropriate)  Awake, alert and oriented in no distress. Denies pain or discomfort. Free of falls this shift. Safety ongoing with alarms in use.

## 2017-03-01 NOTE — PROGRESS NOTES
Progress Note  Physical Medicine & Rehab    Admit Date: 2/14/2017   LOS: 14 days     SUBJECTIVE:     Follow-up For:  Daily round     Review of Systems:  denies cp, sob or abdominal discomfort     OBJECTIVE:     Vital Signs (Most Recent)  Temp: 97.6 °F (36.4 °C) (02/28/17 0617)  Pulse: (!) 55 (02/28/17 0617)  Resp: 18 (02/28/17 0617)  BP: 122/75 (02/28/17 0617)  SpO2: 99 % (02/28/17 0617)    Vital Signs Range (Last 24H):  Temp:  [97.6 °F (36.4 °C)]   Pulse:  [53-55]   Resp:  [18]   BP: (122-130)/(73-75)   SpO2:  [99 %]     I & O (Last 24H):  Intake/Output Summary (Last 24 hours) at 02/28/17 1853  Last data filed at 02/28/17 1800   Gross per 24 hour   Intake             1320 ml   Output             1000 ml   Net              320 ml     Physical Exam:  Calm, cooperative in no distress'  Lung: CTA B  Heart: rrr no m  Abd: soft, NTND + BS  Ext: no edema  Skin: intact     Laboratory:  Recent Results (from the past 72 hour(s))   Basic metabolic panel    Collection Time: 02/27/17  5:50 AM   Result Value Ref Range    Sodium 142 136 - 145 mmol/L    Potassium 4.2 3.5 - 5.1 mmol/L    Chloride 108 95 - 110 mmol/L    CO2 25 23 - 29 mmol/L    Glucose 97 70 - 110 mg/dL    BUN, Bld 20 8 - 23 mg/dL    Creatinine 1.1 0.5 - 1.4 mg/dL    Calcium 9.5 8.7 - 10.5 mg/dL    Anion Gap 9 8 - 16 mmol/L    eGFR if African American >60 >60 mL/min/1.73 m^2    eGFR if non African American >60 >60 mL/min/1.73 m^2       Diagnostic Results:  no new imaging     ASSESSMENT/PLAN:     Active Hospital Problems    Diagnosis  POA    Mild malnutrition [E44.1]  Yes    CVA (cerebral vascular accident) [I63.9]  Yes      Resolved Hospital Problems    Diagnosis Date Resolved POA   No resolved problems to display.       CVA cont PT, OT, ST  HTN, vitals noted, cont current meds  DVT prophylaxis, cont sq Lovenox    Anticoagulation, cont Plavix & asa

## 2017-03-01 NOTE — PROGRESS NOTES
Progress Note  Physical Medicine & Rehab    Admit Date: 2/14/2017   LOS: 15 days     SUBJECTIVE:     Follow-up For:  Daily round     Review of Systems:  denies cp, sob or abdominal discomfort     OBJECTIVE:     Vital Signs (Most Recent)  Temp: 97.9 °F (36.6 °C) (03/01/17 0556)  Pulse: (!) 50 (03/01/17 0556)  Resp: 16 (03/01/17 0556)  BP: 102/68 (03/01/17 0556)  SpO2: 98 % (03/01/17 0556)    Vital Signs Range (Last 24H):  Temp:  [97.9 °F (36.6 °C)-98.2 °F (36.8 °C)]   Pulse:  [50-51]   Resp:  [16-22]   BP: (102-148)/(67-79)   SpO2:  [97 %-98 %]     I & O (Last 24H):  Intake/Output Summary (Last 24 hours) at 03/01/17 0757  Last data filed at 02/28/17 1800   Gross per 24 hour   Intake              840 ml   Output              800 ml   Net               40 ml     Physical Exam:  Calm, cooperative in no distress  Lung: CTA B  Heart: rrr no m  Abd: soft, NTND + BS  Ext: no edema  Skin: intact     Laboratory:  Recent Results (from the past 72 hour(s))   Basic metabolic panel    Collection Time: 02/27/17  5:50 AM   Result Value Ref Range    Sodium 142 136 - 145 mmol/L    Potassium 4.2 3.5 - 5.1 mmol/L    Chloride 108 95 - 110 mmol/L    CO2 25 23 - 29 mmol/L    Glucose 97 70 - 110 mg/dL    BUN, Bld 20 8 - 23 mg/dL    Creatinine 1.1 0.5 - 1.4 mg/dL    Calcium 9.5 8.7 - 10.5 mg/dL    Anion Gap 9 8 - 16 mmol/L    eGFR if African American >60 >60 mL/min/1.73 m^2    eGFR if non African American >60 >60 mL/min/1.73 m^2       Diagnostic Results:  no new imaging     ASSESSMENT/PLAN:     Active Hospital Problems    Diagnosis  POA    Mild malnutrition [E44.1]  Yes    CVA (cerebral vascular accident) [I63.9]  Yes      Resolved Hospital Problems    Diagnosis Date Resolved POA   No resolved problems to display.       CVA cont PT, OT, ST   Anticoagulation, cont asa & Plavix  DVT prophylaxis, cont sq Lovenox  HTN:  vitals noted, cont current meds

## 2017-03-01 NOTE — PT/OT/SLP PROGRESS
Physical Therapy         Treatment        Forest Villanueva Jr.   MRN: [id number]     PT Received On: 17  Total Time (min): 75        Billable Minutes:  Gait [id number], Therapeutic Activity 0, Therapeutic Exercise 40 and Train/Wheelchair Management 10  Total Minutes: 75    Treatment Type: Treatment  PT/PTA: PTA     PTA Visit Number: 3       General Precautions: Standard, fall, aspiration, nectar thick, vision impaired  Orthopedic Precautions: Orthopedic Precautions : N/A   Braces: Braces: N/A         Subjective:  Communicated with nursing and OT Roseann prior to session.    Pt agreeable to session. Pt expressed that he was tired today.  Pain Ratin/10              Pain Rating Post-Intervention: 0/10    Objective:  Patient found seated in w/c, with Patient found with:  (N/A)    Functional Mobility:  Bed Mobility: did not occur    Balance:   Static Stand: FAIR: Maintains without assist but unable to take challenges  Dynamic stand: FAIR: Needs CONTACT GUARD during gait    Transfer Training:  Sit to stand:Contact Guard Assistance with Rolling Walker .    Wheelchair Training:  Pt propelled Standard wheelchair x 200' feet on Level tile with  Bilateral upper extremity and Bilateral lower extremity with Supervision or Set-up Assistance and increased time 2' slow pace.     Gait Trainin' and 152' with CGA using RW. Pt required cues for direction and to avoid obstacles/doorways. [place] knee cage used on R on 2nd gait trial to prevent excessive knee hyperextension.     Additional Treatment:  Seated BLE therex: HR/TR, ankle df with black tb, LAQ and marching with 2# B, hip abd and hs curls with black tb, hip add ball squeeze 3 x 10 reps each with a very slow pace    SciFit: BUE/LE lv 1 x 12'    Activity Tolerance:  Patient tolerated treatment well and Patient limited by fatigue    Patient left up in chair with chair alarm on and nursing and Speech Therapy notified.      Assessment:  Forest Villanueva Jr. is a 71 y.o.  male with a medical diagnosis of <principal problem not specified>. He presents with impaired functional mobility/independence 2' weakness, R hemiparesis, decreased balance/stability, impaired motor control/planning, decreased endurance/activity tolerance and decreased safety awareness. Pt continues to require increased time to complete all tasks 2' slow pace and delayed processing.   Pt with noted increased fatigue today    Rehab potential is good.      Activity tolerance: Good    GOALS:   Physical Therapy Goals        Problem: Physical Therapy Goal    Goal Priority Disciplines Outcome Goal Variances Interventions   Physical Therapy Goal     PT/OT, PT Ongoing (interventions implemented as appropriate)     Description:  Goals to be met by: 3/17/2017     Patient will increase functional independence with mobility by performin). Supine to sit with Modified [place]  2). Sit to supine with Modified [place]  3). Sit to stand transfer with Stand-by Assistance  4). Bed to chair transfer with Stand-by Assistance   5). Gait  x > 150 feet with Stand-by Assistance   6). Wheelchair propulsion x > 200 feet with Modified [place]               PLAN:    Patient to be seen daily  to address the above listed problems via gait training, therapeutic activities, therapeutic exercises, neuromuscular re-education, wheelchair management/training  Plan of Care expires: 17  Plan of Care reviewed with: patient         Arleen Bryant, PTA 3/1/2017

## 2017-03-01 NOTE — PT/OT/SLP PROGRESS
"Speech Language Pathology Treatment          Forest Villanueva Jr.   MRN: [id number]     Diet recommendations: Solid Diet Level: Finely chopped meat  Liquid Diet Level:  (Vines Free Water Protocol encouraged/) HOB to 90 degrees, Small bites/sips, 1 bite/sip at a time, Meds whole 1 at a time, Eliminate distractions, Assistance with meals and Assistance with thickening liquids    SLP Treatment Date: 03/01/17  Speech Start Time: 1430     Speech Stop Time: 1500     Speech Total (min): 30 min       TREATMENT BILLABLE MINUTES:  Speech Therapy Individual 22, Treatment Swallowing Dysfunction 8 and Total Time 30    General Precautions: Standard, aspiration, nectar thick, fall, vision impaired          Subjective:  Patient presents labile, crying, then states "It's fine, I'm ok."  Nurse Chito in room to assist with restroom at end of session, explains "he does occasionally cry."  Patient presents withdrawn, fatigued.     Objective:   Patient found in RHB hallway, rolling self to room, then seen in room in wheelchair with eyeglasses on. Patient in wheelchair at end of session with call light in reach and chair alarm intact.     1. Patient will state compensatory strategies for oral clearance/safe swallow strategies and demonstrate use of strategies MIN A.  Today he requires minimal verbal prompting to expand past 2 safe swallow strategies, moderate cueing to name up to 4.     4. Patient will attend to items on R side of tray with MIN A for demonstrated use of compensatory strategies for visiospatial skills.  Simple Puzzle/grid tasks x6 provided extra time and moderate verbal/visual cues from SLP today to complete tasks with 100% accuracy.     8. NEW: Patient will tolerate trials of thin liquids w/o overt S/S aspiration, MIN A for use of safe swallow strategies, to improve swallow. Sips of water x8 w/o overt S/S aspiration. SLP educated Patient on Vines Free Water Protocol and posted precautions in room.  Nurse notified of " recommendations for Vines protocol.     Assessment:  Forest Villanueva Jr. is a 71 y.o. male with a SLP diagnosis of Dysphagia, Cognitive-Linguistic Impairment and Visvo-Spatial Impairment. He presents labile today, crying at times t/o session. SLP provided encouragement and redirection 2/2 lability. He completes basic seek&find puzzle (6 items) provided moderate verbal/visual cues from SLP and extra time (22 minutes.)  SLP provided encouragement and redirection. He accepts 8 sips of water this service day. No w/o overt S/S aspiration noted with trials of thin liquids. SLP educated Patient on Vines Free Water Protocol and posted precautions in room.  Nurse Chito notified of recommendations for Vines protocol and she v/u.     Discharge recommendations: Discharge Facility/Level Of Care Needs: home with [facility].     Goals:   SLP Goals        Problem: SLP Goal    Goal Priority Disciplines Outcome   SLP Goal     SLP Ongoing (interventions implemented as appropriate)   Description:  Short Term Goals modified 2/23/2017:  1. Patient will state compensatory strategies for oral clearance/safe swallow strategies and demonstrate use of strategies MIN A  2. Patient will tolerate Level II Dysphagia DIet (finely chopped meats) with NECTAR-thickened liquids w/o overt S/S aspiration, MIN A, to improve swallow  3. Patient will complete OMEs x20 ea, MIN A, to improve R lingual/labial coordination and strength  4. Patient will attend to items on R side of tray with MIN A for demonstrated use of compensatory strategies for visiospatial skills  5. Patient will name 8 - 10 items/minute in a concrete category, MIN A, to improve word-finding and response time   6. Patient will demo clear speech strategies at the conversational level 90% of time or more, MIN A, to improve speech  7. Patient will recall 3/3 related items for imm, and post 3 minute filled delay, MIN A, to improve memory  8. NEW: Patient will tolerate trials of thin liquids  w/o overt S/S aspiration, MIN A for use of safe swallow strategies, to improve swallow   Long Term Goals:  1. Patient will tolerate least restrictive diet without S/S aspiration, and good oral clearance, Supervision  2. Educate Patient and family on POC and compensatory strategies for safe swallow   3. Patient will use functional memory strategies to recall events of the day and safely complete ADLs, with Supervision                    Plan: Continue per POC and initiate Vines Free water Protocol.  Precuations posted in room and reviewed with nurse.  Patient to be seen Therapy Frequency: 5 x/week   Plan of Care Expires: 03/08/17  Plan of Care reviewed with: patient  SLP Follow-up?: Yes  SLP - Next Visit Date: 03/02/17           Nicolle Pulliam CCC-SLP 3/1/2017

## 2017-03-01 NOTE — PLAN OF CARE
Problem: Pressure Ulcer Risk (Reed Scale) (Adult,Obstetrics,Pediatric)  Goal: Identify Related Risk Factors and Signs and Symptoms  Related risk factors and signs and symptoms are identified upon initiation of Human Response Clinical Practice Guideline (CPG)   Outcome: Ongoing (interventions implemented as appropriate)  Pt turns self in bed. Repositions in chair

## 2017-03-02 PROCEDURE — 63600175 PHARM REV CODE 636 W HCPCS: Performed by: PHYSICAL MEDICINE & REHABILITATION

## 2017-03-02 PROCEDURE — 97530 THERAPEUTIC ACTIVITIES: CPT

## 2017-03-02 PROCEDURE — 97532 *HC SP COG SKL DEV EA 15 MIN: CPT

## 2017-03-02 PROCEDURE — 97535 SELF CARE MNGMENT TRAINING: CPT

## 2017-03-02 PROCEDURE — 97110 THERAPEUTIC EXERCISES: CPT

## 2017-03-02 PROCEDURE — 25000003 PHARM REV CODE 250: Performed by: PHYSICAL MEDICINE & REHABILITATION

## 2017-03-02 PROCEDURE — 12800000 HC REHAB SEMI-PRIVATE ROOM

## 2017-03-02 PROCEDURE — 92507 TX SP LANG VOICE COMM INDIV: CPT

## 2017-03-02 PROCEDURE — 63700000 PHARM REV CODE 250 ALT 637 W/O HCPCS: Performed by: PHYSICAL MEDICINE & REHABILITATION

## 2017-03-02 PROCEDURE — 97116 GAIT TRAINING THERAPY: CPT

## 2017-03-02 PROCEDURE — 97542 WHEELCHAIR MNGMENT TRAINING: CPT

## 2017-03-02 RX ADMIN — BRIMONIDINE TARTRATE 1 DROP: 1 SOLUTION/ DROPS OPHTHALMIC at 09:03

## 2017-03-02 RX ADMIN — CLOPIDOGREL BISULFATE 75 MG: 75 TABLET ORAL at 09:03

## 2017-03-02 RX ADMIN — DORZOLAMIDE HYDROCHLORIDE AND TIMOLOL MALEATE 1 DROP: 20; 5 SOLUTION/ DROPS OPHTHALMIC at 09:03

## 2017-03-02 RX ADMIN — LATANOPROST 1 DROP: 50 SOLUTION OPHTHALMIC at 09:03

## 2017-03-02 RX ADMIN — DOCUSATE SODIUM 100 MG: 100 CAPSULE, LIQUID FILLED ORAL at 09:03

## 2017-03-02 RX ADMIN — ASPIRIN 81 MG: 81 TABLET, COATED ORAL at 09:03

## 2017-03-02 RX ADMIN — ENOXAPARIN SODIUM 40 MG: 100 INJECTION SUBCUTANEOUS at 01:03

## 2017-03-02 NOTE — PT/OT/SLP PROGRESS
Occupational Therapy  Treatment    Forest Villanueva Jr.   MRN: [id number]   Admitting Diagnosis: <principal problem not specified>    OT Date of Treatment: 17   Total Time (min): 45 min      Billable Minutes:  Therapeutic Activity 45  Total Minutes: 45    General Precautions: Standard, aspiration, fall, vision impaired, pacemaker, nectar thick  Orthopedic Precautions: N/A  Braces:      Do you have any cultural, spiritual, Jain conflicts, given your current situation?: Holiness    Subjective:  Communicated with nurse prior to session.    Pain Ratin/10              Pain Rating Post-Intervention: 0/10    Objective:       Functional Mobility:  Bed Mobility:   Supine to sit: Activity did not occur   Sit to supine: Activity did not occur   Rolling: Activity did not occur   Scooting: Activity did not occur        Additional Treatment:  Session began with AIDET. Session included fine motor activities. B hand activities. Able to manipulate and flip pegs from [place] pegboard, grasp and release nuts on bolts, able to tighten and also remove all from board.     Patient left in wheelchair with nurse notified    ASSESSMENT:  Forest Villanueva Jr. is a 71 y.o. male with a medical diagnosis of CVA.    Rehab potential is good    Activity tolerance: Good    Discharge recommendations: home     Equipment recommendations:  (TBD)     GOALS:   Occupational Therapy Goals        Problem: Occupational Therapy Goal    Goal Priority Disciplines Outcome Interventions   Occupational Therapy Goal     OT, PT/OT Ongoing (interventions implemented as appropriate)    Description:  Goals to be met by: 3/14/17     Patient will increase functional independence with ADLs by performing:    Feeding with Set-up Assistance.  UE Dressing with Set-up Assistance.  LE Dressing with Set-up Assistance.  Grooming while seated with Set-up Assistance.  Toileting from toilet with Set-up Assistance for hygiene and clothing management.   Bathing from  shower  chair/bench with Set-up Assistance.  Shower transfer with Supervision.  Toilet transfer to toilet with Supervision.                Plan:  Patient to be seen 6 x/week to address the above listed problems via self-care/home management, community/work re-entry, therapeutic activities, therapeutic exercises, neuromuscular re-education, wheelchair management/training  Plan of Care expires: 03/14/17  Plan of Care reviewed with: patient         MORGAN Delarosa  03/02/2017

## 2017-03-02 NOTE — ASSESSMENT & PLAN NOTE
Nutrition Problem:   Mild Malnutrition    % Intake of Estimated Energy Needs: 25 - 50 %  % Meal Intake: 50%  Malnutrition Reason: Other: BMI <23 in 67 yo (22.9 BMI)  Moderate Weight Loss Acute Illness/Injury: Other: na  Moderate Weight Loss Social/Environmental: Other: na  Moderate Weight Loss Chronic Illness: Other: na    Etiology/Related to:   Limited intake AEB meal intake 50%    Treatment Strategy:   1) Continue cardiac diet 2) Add Boost Plus with meals    Nutrition Diagnosis Status:   Progressing

## 2017-03-02 NOTE — PT/OT/SLP PROGRESS
"Speech Language Pathology Treatment          Forest Villanueva Jr.   MRN: [id number]     Diet recommendations:Solid Diet Level: Finely chopped meat Liquid Diet Level: (Vines Free Water Protocol encouraged/) HOB to 90 degrees, Small bites/sips, 1 bite/sip at a time, Meds whole 1 at a time, Eliminate distractions, Assistance with meals and Assistance with thickening liquids    SLP Treatment Date: 03/02/17  Speech Start Time: 1105     Speech Stop Time: 1137     Speech Total (min): 32 min       TREATMENT BILLABLE MINUTES:  Speech Therapy Individual 32 and Total Time 32    General Precautions: Standard, aspiration, fall, nectar thick, vision impaired          Subjective:  Pt lethargic, exhibits flat-affect and low volume. "I'm cold".     Objective:   Patient found in room in bed with bed alarm on. Pt seen for ST in room.       3. Patient will complete OMEs x20 ea, MIN A, to improve R lingual/labial coordination and strength  Pt completed lingual and labial OMEs x10 with fair effort using therapy mirror and frequent cues.     5. Patient will name 8 - 10 items/minute in a concrete category, MIN A, to improve word-finding and response time   Pt named 5-6 items in concrete categories with MOD A.     7. Patient will recall 3/3 related items for imm, and post 3 minute filled delay, MIN A, to improve memory  Pt recalled 3/3 unrelated items after 3 minute delay with Min A for use of memory strategies.     Assessment:  Forest Villanueva Jr. is a 71 y.o. male with a SLP diagnosis of Oropharyngeal Dysphagia, Dysarthria and Cognitive-Linguistic Impairment. He presented with fair effort in ST this service day with frequent encouragement. Pt met current goal for STM.    Diet recommendations:   Solid Diet Level:  (Dysphagia Diet Level 2)    Liquid Diet Level: Nectar Thick  HOB to 90 degrees, Small bites/sips, 1 bite/sip at a time, Meds whole 1 at a time, Eliminate distractions, Assistance with meals and Assistance with thickening " liquids    Discharge recommendations: Discharge Facility/Level Of Care Needs: home     Goals:   SLP Goals        Problem: SLP Goal    Goal Priority Disciplines Outcome   SLP Goal     SLP Ongoing (interventions implemented as appropriate)   Description:  Short Term Goals modified 2/23/2017:  1. Patient will state compensatory strategies for oral clearance/safe swallow strategies and demonstrate use of strategies MIN A  2. Patient will tolerate Level II Dysphagia DIet (finely chopped meats) with NECTAR-thickened liquids w/o overt S/S aspiration, MIN A, to improve swallow  3. Patient will complete OMEs x20 ea, MIN A, to improve R lingual/labial coordination and strength  4. Patient will attend to items on R side of tray with MIN A for demonstrated use of compensatory strategies for visiospatial skills  5. Patient will name 8 - 10 items/minute in a concrete category, MIN A, to improve word-finding and response time   6. Patient will demo clear speech strategies at the conversational level 90% of time or more, MIN A, to improve speech  7. Patient will recall 3/3 related items for imm, and post 3 minute filled delay, MIN A, to improve memory- MET      Patient will recall details of picture scenes after 5 minutes using learned memory strategies with MIN A  8. NEW: Patient will tolerate trials of thin liquids w/o overt S/S aspiration, MIN A for use of safe swallow strategies, to improve swallow   Long Term Goals:  1. Patient will tolerate least restrictive diet without S/S aspiration, and good oral clearance, Supervision  2. Educate Patient and family on POC and compensatory strategies for safe swallow   3. Patient will use functional memory strategies to recall events of the day and safely complete ADLs, with Supervision                     Plan: Continue POC  Patient to be seen Therapy Frequency: 5 x/week   Plan of Care Expires: 03/08/17  Plan of Care reviewed with: patient  SLP Follow-up?: Yes  SLP - Next Visit Date:  03/03/17      Leticia Woodward, Student Clinician    Swetha Clarke MS, CCC/SLP  I certify that I was present in the room directing the student in service delivery and guiding them using my skilled judgment. As the co-signing therapist I have reviewed the students documentation and am responsible for the treatment, assessment, and plan.       Swetha lCarke, CCC-SLP 3/2/2017

## 2017-03-02 NOTE — PLAN OF CARE
Problem: Occupational Therapy Goal  Goal: Occupational Therapy Goal  Goals to be met by: 3/14/17     Patient will increase functional independence with ADLs by performing:    Feeding with Set-up Assistance.  UE Dressing with Set-up Assistance.  LE Dressing with Set-up Assistance.  Grooming while seated with Set-up Assistance.  Toileting from toilet with Set-up Assistance for hygiene and clothing management.   Bathing from shower chair/bench with Set-up Assistance.  Shower transfer with Supervision.  Toilet transfer to toilet with Supervision.   Outcome: Ongoing (interventions implemented as appropriate)  Goals remain appropriate.  MORGAN Delarosa

## 2017-03-02 NOTE — PLAN OF CARE
Problem: Nutrition, Imbalanced: Inadequate Oral Intake (Adult)  Goal: Identify Related Risk Factors and Signs and Symptoms  Related risk factors and signs and symptoms are identified upon initiation of Human Response Clinical Practice Guideline (CPG)   Outcome: Ongoing (interventions implemented as appropriate)  Pt alert. Poor appetite. Drinks 100% boost. Encourage to eat at least 50 % meals

## 2017-03-02 NOTE — PROGRESS NOTES
[facility]  Adult Nutrition  Progress Note    SUMMARY     Recommendations    Recommendation/Intervention: 1) Continuer Cardiac diet with texture per SLP recs 2) Boost PLus with meals  Goals: 1) Meal intake 75% 2) Utilize Boost  Nutrition Goal Status: progressing towards goal, goal met  Communication of RD Recs:  (care plan)    1. CVA (cerebral vascular accident)      History reviewed. No pertinent past medical history.    Continuum of Care Plan    Referral to Outpatient Services:  (Cardiac with texture per SLP, Boost prn)    Reason for Assessment    Reason for Assessment: RD follow-up   Interdisciplinary Rounds: did not attend   General Information Comments: Pt reports he is using Boost Plus.   Showing wt gain from 65.1 kg to 67.4 kg in 7 days.    Nutrition Prescription Ordered    Current Diet Order: Cardiac with texture per SLP rec  Nutrition Order Comments: dysphagia 2, nectar thick liquids     Evaluation of Received Nutrients/Fluid Intake   Energy Calories Required: meeting needs   Protein Required: meeting needs  Other Fluid (mL): 600 (per I&O)    Comments: Meal intake varies from 25-75% plus pt report of using Boost Plus at least once daily.  Pt is gaining wt.  Tolerance: tolerating     Nutrition Risk Screen     Nutrition Risk Screen: no indicators present    Nutrition/Diet History    Patient Reported Diet/Restrictions/Preferences: general   Factors Affecting Nutritional Intake: decreased appetite, difficulty/impaired swallowing     Labs/Tests/Procedures/Meds    Diagnostic Test/Procedure Review: reviewed, pertinent   Pertinent Labs Reviewed: reviewed, pertinent      BMP  Lab Results   Component Value Date     02/27/2017    K 4.2 02/27/2017     02/27/2017    CO2 25 02/27/2017    BUN 20 02/27/2017    CREATININE 1.1 02/27/2017    CALCIUM 9.5 02/27/2017    ANIONGAP 9 02/27/2017    ESTGFRAFRICA >60 02/27/2017    EGFRNONAA >60 02/27/2017     Lab Results   Component Value Date     ALBUMIN 3.4 (L) 2017     No results found for: CHOL  No results found for: HDL  No results found for: LDLCALC  No results found for: TRIG  No results found for: CHOLHDL    Pertinent Medications Reviewed: reviewed, pertinent       Physical Findings    Overall Physical Appearance: underweight   Oral/Mouth Cavity: teeth absent  Skin:  (Reed score 17)    Anthropometrics    Height Method: Stated  Height (inches): 65.98 in  Weight Method: Bed Scale  Weight (kg): 67.4 kg (17)  Ideal Body Weight (IBW), Male: 141.88 lb   % Ideal Body Weight, Male (lb): 100.23 lb   BMI (kg/m2): 22.96  BMI Grade:  (Underweight r/t BMI <23 in 67 yo)  Usual Body Weight (UBW), k.1 kg (17)  % Usual Body Weight: 103.53     Anthropometrics (Special Considerations)      Estimated/Assessed Needs     Weight Used For Calorie Calculations: 64.5 kg (142 lb 3.2 oz)   Height (cm): 167.6 cm   Energy Need Method: Kcal/kg    30 kcal/kg (kcal): 1935 and 35 kcal/kg (kcal): 2257.5 for wt gain  Weight Used For Protein Calculations: 64.5 kg (142 lb 3.2 oz)   1.0 gm Protein (gm): 64.63 and 1.2 gm Protein (gm): 77.56  Fluid Need Method: (1 ml/kcal or per MD rec)     Monitor and Evaluation    Food and Nutrient Intake: energy intake  Food and Nutrient Adminstration: diet order   Anthropometric Measurements: weight, weight change  Biochemical Data, Medical Tests and Procedures: lipid profile, electrolyte and renal panel  Nutrition-Focused Physical Findings: overall appearance, skin    Nutrition Risk    Level of Risk:  (1 x weekly)    Nutrition Follow-Up    RD Follow-up?: Yes    Assessment and Plan    Mild malnutrition  Nutrition Problem:   Mild Malnutrition    % Intake of Estimated Energy Needs: 25 - 50 %  % Meal Intake: 50%  Malnutrition Reason: Other: BMI <23 in 67 yo (22.9 BMI)  Moderate Weight Loss Acute Illness/Injury: Other: na  Moderate Weight Loss Social/Environmental: Other: na  Moderate Weight Loss Chronic Illness: Other:  na    Etiology/Related to:   Limited intake AEB meal intake 50%    Treatment Strategy:   1) Continue cardiac diet 2) Add Boost Plus with meals    Nutrition Diagnosis Status:   Progressing

## 2017-03-02 NOTE — PLAN OF CARE
Problem: Nutrition, Imbalanced: Inadequate Oral Intake (Adult)  Goal: Identify Related Risk Factors and Signs and Symptoms  Related risk factors and signs and symptoms are identified upon initiation of Human Response Clinical Practice Guideline (CPG)   Recommendation/Intervention: 1) Continuer Cardiac diet with texture per SLP recs 2) Boost PLus with meals  Goals: 1) Meal intake 75% 2) Utilize Boost  Nutrition Goal Status: progressing towards goal, goal met  Communication of RD Recs: (care plan)

## 2017-03-02 NOTE — PT/OT/SLP PROGRESS
"Physical Therapy         Treatment        Forest Villanueva Jr.   MRN: [id number]     PT Received On: 03/02/17  Total Time (min): 75        Billable Minutes:  Gait [id number], Therapeutic Activity 25, Therapeutic Exercise 30 and Train/Wheelchair Management 10  Total Minutes: 75    Treatment Type: Treatment  PT/PTA: PTA     PTA Visit Number: 4       General Precautions: Standard, aspiration, fall, pacemaker, nectar thick, vision impaired  Orthopedic Precautions: Orthopedic Precautions : N/A   Braces: Braces: N/A         Subjective:  Communicated with nurse Dale prior to session.    Pt agreeable to therapy. Pt reporting at end of gait trial that he felt a little dizzy and was feeling warm. Pt also stating, "my head feels tight and warm." Vitals taken: 107/62 and 49 BPM. Nurse Chito present and aware.     Pain Rating:  (Pt initially reporting pain going down lateral shin. Pt then stated pain was in L foot. Pt did not numerically rate. Towards middle of session, pt stating he felt batter and had no pain.)  Location - Side: Left     Location:  (lower leg/foot)  Pain Addressed: Cessation of Activity, Distraction, Reposition, Nurse notified       Objective:  Patient found seated in w/c, with Patient found with:  (NA)    Functional Mobility:  Bed Mobility:   Supine to sit: Activity did not occur   Sit to supine: Supervision or Set-up Assistance   Rolling: Supervision or Set-up Assistance   Scooting: Supervision or Set-up Assistance    Balance:   Static Stand: FAIR: Maintains without assist but unable to take challenges  Dynamic stand: FAIR: Needs CONTACT GUARD during gait    Transfer Training:  Sit to stand:Contact Guard Assistance with No Assistive Device and Rolling Walker .  Bed <> Chair:  Stand Pivot with Contact Guard Assistance with No Assistive Device .  Toilet Transfer:  Pt Stand Pivot with Contact Guard Assistance with Grab bars cues for proper tech and safety    Wheelchair Training:  Pt propelled Standard wheelchair " x 200' feet on Level tile with  Bilateral lower extremity with Supervision or Set-up Assistance and increased time 2' slow pace.     Gait Trainin' with CGA using RW. At end of gait trial pt started to slow down pace and when asked what was wrong, pt hesitant to answer. Pt then stated he felt a little dizzy and was instructed to sit in chair.     Additional Treatment:  Seated BLE therex: HR/TR, ankle df with blue tb, LAQ and marching with 2# B, hip abd and hs curls with blue tb, hip add ball squeeze 3 x 10 reps each with a very slow pace     Seated BLE coordination, ROM, endurance act (using 2# B): kicking small ball back and forth with PTA x 4-5' and toe-tapping each LE to different color cones x 4-5'.     Pt required min A for doffing/donning pants and adult brief. Pt required SBA for toileting and hygiene task.     Activity Tolerance:  Patient limited by fatigue and Treatment limited secondary to medical complications (reported dizziness)    Patient left supine with call button in reach, bed alarm on and nurse Hilda and SARAHI Arguelles notified.    Assessment:  Forest Villanueva Jr. is a 71 y.o. male with a medical diagnosis of <principal problem not specified>. He presents with impaired functional mobility/independence 2' weakness, R hemiparesis, decreased balance/stability, impaired motor control/planning, decreased endurance/activity tolerance and decreased safety awareness. Pt continues to require increased time to complete all tasks 2' slow pace and delayed processing. Pt tolerated session well until end of session. Pt with reported dizziness and 'feeling warm', limiting gait distance and requiring pt to go BTB.     Rehab potential is good.      Activity tolerance: Good/Fair    GOALS:   Physical Therapy Goals        Problem: Physical Therapy Goal    Goal Priority Disciplines Outcome Goal Variances Interventions   Physical Therapy Goal     PT/OT, PT Ongoing (interventions implemented as appropriate)      Description:  Goals to be met by: 3/17/2017     Patient will increase functional independence with mobility by performin). Supine to sit with Modified [place]  2). Sit to supine with Modified [place]  3). Sit to stand transfer with Stand-by Assistance  4). Bed to chair transfer with Stand-by Assistance   5). Gait  x > 150 feet with Stand-by Assistance   6). Wheelchair propulsion x > 200 feet with Modified [place]               PLAN:    Patient to be seen daily  to address the above listed problems via gait training, therapeutic activities, therapeutic exercises, neuromuscular re-education, wheelchair management/training  Plan of Care expires: 17  Plan of Care reviewed with: patient         Arleen Bryant, PTA 3/2/2017

## 2017-03-02 NOTE — PROGRESS NOTES
Progress Note  Physical Medicine & Rehab    Admit Date: 2/14/2017   LOS: 16 days     SUBJECTIVE:     Follow-up For:  Daily round     Review of Systems:  denies cp, sob or abdominal discomfort     OBJECTIVE:     Vital Signs (Most Recent)  Temp: 97.5 °F (36.4 °C) (03/02/17 0458)  Pulse: (!) 50 (03/02/17 0458)  Resp: 18 (03/02/17 0458)  BP: 124/75 (03/02/17 0458)  SpO2: 99 % (03/02/17 0458)    Vital Signs Range (Last 24H):  Temp:  [97.5 °F (36.4 °C)-98.2 °F (36.8 °C)]   Pulse:  [50-53]   Resp:  [16-18]   BP: (122-132)/(57-76)   SpO2:  [95 %-99 %]     I & O (Last 24H):  Intake/Output Summary (Last 24 hours) at 03/02/17 0804  Last data filed at 03/01/17 1748   Gross per 24 hour   Intake             1680 ml   Output              200 ml   Net             1480 ml     Physical Exam:  Calm, cooperative in no distress  Lung: CTA B  Heart: rrr no m  Abd: soft, NTND + BS  Ext: no edema  Skin: intact     Laboratory:  No results found for this or any previous visit (from the past 72 hour(s)).    Diagnostic Results:  no new imaging     ASSESSMENT/PLAN:     Active Hospital Problems    Diagnosis  POA    Mild malnutrition [E44.1]  Yes    CVA (cerebral vascular accident) [I63.9]  Yes      Resolved Hospital Problems    Diagnosis Date Resolved POA   No resolved problems to display.       CVA cont PT, OT, ST   Anticoagulation, cont asa & Plavix  DVT prophylaxis, cont sq Lovenox  HTN, vitals noted, cont current meds

## 2017-03-03 PROCEDURE — 12800000 HC REHAB SEMI-PRIVATE ROOM

## 2017-03-03 PROCEDURE — 25000003 PHARM REV CODE 250: Performed by: PHYSICAL MEDICINE & REHABILITATION

## 2017-03-03 PROCEDURE — 97530 THERAPEUTIC ACTIVITIES: CPT

## 2017-03-03 PROCEDURE — 97532 *HC SP COG SKL DEV EA 15 MIN: CPT

## 2017-03-03 PROCEDURE — 97116 GAIT TRAINING THERAPY: CPT

## 2017-03-03 PROCEDURE — 97535 SELF CARE MNGMENT TRAINING: CPT

## 2017-03-03 PROCEDURE — 63700000 PHARM REV CODE 250 ALT 637 W/O HCPCS: Performed by: PHYSICAL MEDICINE & REHABILITATION

## 2017-03-03 PROCEDURE — 63600175 PHARM REV CODE 636 W HCPCS: Performed by: PHYSICAL MEDICINE & REHABILITATION

## 2017-03-03 PROCEDURE — 97542 WHEELCHAIR MNGMENT TRAINING: CPT

## 2017-03-03 PROCEDURE — 97110 THERAPEUTIC EXERCISES: CPT

## 2017-03-03 RX ADMIN — BRIMONIDINE TARTRATE 1 DROP: 1 SOLUTION/ DROPS OPHTHALMIC at 09:03

## 2017-03-03 RX ADMIN — ENOXAPARIN SODIUM 40 MG: 100 INJECTION SUBCUTANEOUS at 01:03

## 2017-03-03 RX ADMIN — CLOPIDOGREL BISULFATE 75 MG: 75 TABLET ORAL at 09:03

## 2017-03-03 RX ADMIN — DORZOLAMIDE HYDROCHLORIDE AND TIMOLOL MALEATE 1 DROP: 20; 5 SOLUTION/ DROPS OPHTHALMIC at 09:03

## 2017-03-03 RX ADMIN — LATANOPROST 1 DROP: 50 SOLUTION OPHTHALMIC at 09:03

## 2017-03-03 RX ADMIN — ASPIRIN 81 MG: 81 TABLET, COATED ORAL at 09:03

## 2017-03-03 RX ADMIN — DOCUSATE SODIUM 100 MG: 100 CAPSULE, LIQUID FILLED ORAL at 09:03

## 2017-03-03 NOTE — PLAN OF CARE
Problem: Occupational Therapy Goal  Goal: Occupational Therapy Goal  Goals to be met by: 3/14/17     Patient will increase functional independence with ADLs by performing:    Feeding with Set-up Assistance.  UE Dressing with Set-up Assistance.  LE Dressing with Set-up Assistance.  Grooming while seated with Set-up Assistance.  Toileting from toilet with Set-up Assistance for hygiene and clothing management.   Bathing from shower chair/bench with Set-up Assistance.  Shower transfer with Supervision.  Toilet transfer to toilet with Supervision.   Outcome: Ongoing (interventions implemented as appropriate)  OT tx for self care and therapeutic activity

## 2017-03-03 NOTE — PT/OT/SLP PROGRESS
Speech Language Pathology Treatment          Forest Villanueva Jr.   MRN: [id number]     Diet recommendations: Solid Diet Level:  (Dysphagia Diet Level 2)  Liquid Diet Level: [place] Thick     SLP Treatment Date: 03/03/17  Speech Start Time: 1240     Speech Stop Time: 1310     Speech Total (min): 30 min       TREATMENT BILLABLE MINUTES:  Speech Therapy Individual 30    General Precautions: Standard, aspiration, fall, nectar thick, vision impaired          Subjective:  Patient up in w/c for tx.  Pleasant and cooperative.  Flat affect.    Objective:     Assessment:  Forest Villanueva Jr. is a 71 y.o. male with a SLP diagnosis of Dysphagia, Dysarthria and Cognitive-Linguistic Impairment.  Patient completed simple visual scanning task with SB assist.  He recalled 3/3 related words after a 3 minute filled delay with MOD assist and cues to utilize rehearsal as a strategy.    Discharge recommendations: Discharge Facility/Level Of Care Needs: home     Goals:   SLP Goals        Problem: SLP Goal    Goal Priority Disciplines Outcome   SLP Goal     SLP Ongoing (interventions implemented as appropriate)   Description:  Short Term Goals modified 2/23/2017:  1. Patient will state compensatory strategies for oral clearance/safe swallow strategies and demonstrate use of strategies MIN A  2. Patient will tolerate Level II Dysphagia DIet (finely chopped meats) with NECTAR-thickened liquids w/o overt S/S aspiration, MIN A, to improve swallow  3. Patient will complete OMEs x20 ea, MIN A, to improve R lingual/labial coordination and strength  4. Patient will attend to items on R side of tray with MIN A for demonstrated use of compensatory strategies for visiospatial skills  5. Patient will name 8 - 10 items/minute in a concrete category, MIN A, to improve word-finding and response time   6. Patient will demo clear speech strategies at the conversational level 90% of time or more, MIN A, to improve speech  7. Patient will recall 3/3 related items  for imm, and post 3 minute filled delay, MIN A, to improve memory- MET      Patient will recall details of picture scenes after 5 minutes using learned memory strategies with MIN A  8. NEW: Patient will tolerate trials of thin liquids w/o overt S/S aspiration, MIN A for use of safe swallow strategies, to improve swallow   Long Term Goals:  1. Patient will tolerate least restrictive diet without S/S aspiration, and good oral clearance, Supervision  2. Educate Patient and family on POC and compensatory strategies for safe swallow   3. Patient will use functional memory strategies to recall events of the day and safely complete ADLs, with Supervision                     Plan:   Patient to be seen Therapy Frequency: 5 x/week   Plan of Care Expires: 03/08/17  Plan of Care reviewed with: patient  SLP Follow-up?: Yes  SLP - Next Visit Date: 03/03/17           Archana Olea CCC-SLP 3/3/2017

## 2017-03-03 NOTE — PROGRESS NOTES
Progress Note  Physical Medicine & Rehab    Admit Date: 2/14/2017   LOS: 17 days     SUBJECTIVE:     Follow-up For:  Daily round     Review of Systems:  denies cp, sob or abdominal discomfort     OBJECTIVE:     Vital Signs (Most Recent)  Temp: 97.5 °F (36.4 °C) (03/03/17 0458)  Pulse: (!) 50 (03/03/17 0458)  Resp: 18 (03/03/17 0458)  BP: (!) 144/77 (03/03/17 0458)  SpO2: 100 % (03/03/17 0458)    Vital Signs Range (Last 24H):  Temp:  [97.4 °F (36.3 °C)-98.5 °F (36.9 °C)]   Pulse:  [50]   Resp:  [18]   BP: ()/(68-77)   SpO2:  [99 %-100 %]     I & O (Last 24H):  Intake/Output Summary (Last 24 hours) at 03/03/17 0919  Last data filed at 03/03/17 0800   Gross per 24 hour   Intake             1040 ml   Output              500 ml   Net              540 ml     Physical Exam:  Calm, cooperative in no distress  Lung: CTA B  Heart: rrr no m  Abd: soft, NTND + BS  Ext: no edema  Skin: intact     Laboratory:  No results found for this or any previous visit (from the past 72 hour(s)).    Diagnostic Results:  no new imaging     ASSESSMENT/PLAN:     Active Hospital Problems    Diagnosis  POA    Mild malnutrition [E44.1]  Yes    CVA (cerebral vascular accident) [I63.9]  Yes      Resolved Hospital Problems    Diagnosis Date Resolved POA   No resolved problems to display.       CVA cont PT, OT, ST   Anticoagulation, cont asa & Plavix  DVT prophylaxis, cont sq Lovenox  HTN, vitals noted, cont current meds

## 2017-03-03 NOTE — PLAN OF CARE
Problem: Patient Care Overview  Goal: Plan of Care Review  Outcome: Ongoing (interventions implemented as appropriate)  Pt alert and oriented. Cont of bladder but does have spills. Contact guard with transfers. POC reviewed

## 2017-03-03 NOTE — PT/OT/SLP PROGRESS
Physical Therapy         Treatment        Forest Villanueva Jr.   MRN: [id number]     PT Received On: 03/03/17  Total Time (min): 70        Billable Minutes:  Gait [id number], Therapeutic Exercise 40 and Train/Wheelchair Management 15  Total Minutes: 70    Treatment Type: Treatment  PT/PTA: PTA     PTA Visit Number: 3       General Precautions: Standard, fall  Orthopedic Precautions: Orthopedic Precautions : N/A   Braces:           Subjective:  Communicated with DEMOND Dale prior to session.Patient agreed to participate with physical therapy.                        Objective:   Patient found with: bed alarm. Patient agreed to participate with physical therapy.    Functional Mobility:  Bed Mobility:   Supine to sit: Moderate Assistance   Sit to supine: Minimal Assistance   Rolling: Minimal Assistance   Scooting: Minimal Assistance    Balance:   Static Sit: FAIR-: Maintains without assist but inconsistent   Dynamic Sit:  FAIR+: Maintains balance through MINIMAL excursions of active trunk motion  Static Stand: POOR: Needs MODERATE assist to maintain  Dynamic stand: FAIR: Needs CONTACT GUARD during gait    Transfer Training:  Sit to stand:Contact Guard Assistance and Minimal Assistance with Rolling Walker with VC  Bed <> Chair:  Stand Pivot with Contact Guard Assistance and with VC with Rolling Walker .  Chair <> Mat: Stand Pivot with Contact Guard Assistance with  Rolling Walker and VC    Wheelchair Training:  Pt propelled Standard wheelchair x 200 feet on Level tile with  Bilateral upper extremity and Bilateral lower extremity with Moderate Assistance.  Patient requires continual VC and CGA to propel WC.    Gait Training:  Patient gait trained FWB/WBAT: bilateral lower extremity 180' x 2  feet on level tile with Rolling Walker with Contact Guard Assistance.  Pt with demonstarting a  2-point gait with decreased delmar, decreased velocity of limb motion, decreased step length, decreased swing-to-stance ratio and decreased  toe-to-floor clearance.Impairments contributing to gait deviations include impaired balance, impaired coordination, impaired postural control and decreased strength      Additional Treatment:  Seated exercises: 2# B 2 x 10 ea: LAQ, marching, hip ab/add, AP  Sci-Fit L-1 x 15 minutes UE and LE    Activity Tolerance:  Patient limited by fatigue    Patient left supine with call button in reach, bed alarm on and RN Moose notified.    Assessment:  Forest Villanueva Jr. is a 71 y.o. male with a medical diagnosis of <principal problem not specified>. He presents with CVA, R edwardo, cardiac pacemaker. Patient has low energy and fatigues easily during exercise requiring many rest periods.    Rehab potential is fair.    Activity tolerance: Fair    Discharge recommendations:       Equipment recommendations:       GOALS:   Physical Therapy Goals        Problem: Physical Therapy Goal    Goal Priority Disciplines Outcome Goal Variances Interventions   Physical Therapy Goal     PT/OT, PT Ongoing (interventions implemented as appropriate)     Description:  Goals to be met by: 3/17/2017     Patient will increase functional independence with mobility by performin). Supine to sit with Modified [place]  2). Sit to supine with Modified [place]  3). Sit to stand transfer with Stand-by Assistance  4). Bed to chair transfer with Stand-by Assistance   5). Gait  x > 150 feet with Stand-by Assistance   6). Wheelchair propulsion x > 200 feet with Modified [place]               PLAN:    Patient to be seen daily  to address the above listed problems via gait training, therapeutic activities, therapeutic exercises, neuromuscular re-education, wheelchair management/training  Plan of Care expires: 17  Plan of Care reviewed with: patient         Patricia RENE Shafer, PTA 3/3/2017

## 2017-03-03 NOTE — PT/OT/SLP PROGRESS
Occupational Therapy  Treatment    Forest Villanueva Jr.   MRN: [id number]   Admitting Diagnosis: <principal problem not specified>    OT Date of Treatment: 17   Total Time (min): 35 min      Billable Minutes:  Self Care/Home Management 20 and Therapeutic Activity 15  Total Minutes: 35    General Precautions: Standard, aspiration, fall, nectar thick, vision impaired  Orthopedic Precautions: N/A  Braces: N/A    Do you have any cultural, spiritual, Yarsani conflicts, given your current situation?: Uatsdin    Subjective:  Communicated with nurse prior to session.    Pain Ratin/10                   Objective:  Patient found with:  (Supine in bed.  He reported that he was wet upon OT arrival.)    Functional Mobility:  Bed Mobility:   Supine to sit: SBA with verbal instruction for sequence      Rolling: SBA with verbal instruction for sequence   Scooting: SBA    Transfer Training:   Bed <> Chair:  Stand Pivot with Contact Guard Assistance with OTR assist per gait belt .  Toilet Transfer:  Pt Stand Pivot with Contact Guard Assistance with Grab bars and OTR assist per gait belt .    Grooming:  Pt declined grooming as he stated he brushed his teeth in the morning.  LE Dressing:  Messi    Toilet Training:  CGA    Balance:   Static Sit: supervision  Dynamic Sit:  SBA to CGA  Static Stand: Messi to CGA  Dynamic stand: Messi to CGA  Verbal instructions for dynamic movements.    Additional Treatment:  Pt instructed in safety precautions in the bathroom. Pt instructed to call for assist as soon as he experiences and urge for the bathroom.  Pt able to propel self primarily with LEs from room to gym with verbal instructions to CGA for steering.  Pt instructed in sequences of movement and hand placement for performance of transfers.    Patient left up in chair with therapist present for continued OT tx. present    ASSESSMENT:  Forest Villanueva Jr. was able to follow all instructions provided to him for self care and functional  mobility.  He completed LE dressing without use of equipment.      Rehab potential is good    Activity tolerance: Good    Discharge recommendations: home     Equipment recommendations:  (TBD)     GOALS:   Occupational Therapy Goals        Problem: Occupational Therapy Goal    Goal Priority Disciplines Outcome Interventions   Occupational Therapy Goal     OT, PT/OT Ongoing (interventions implemented as appropriate)    Description:  Goals to be met by: 3/14/17     Patient will increase functional independence with ADLs by performing:    Feeding with Set-up Assistance.  UE Dressing with Set-up Assistance.  LE Dressing with Set-up Assistance.  Grooming while seated with Set-up Assistance.  Toileting from toilet with Set-up Assistance for hygiene and clothing management.   Bathing from  shower chair/bench with Set-up Assistance.  Shower transfer with Supervision.  Toilet transfer to toilet with Supervision.                Plan:  Patient to be seen 6 x/week to address the above listed problems via self-care/home management, community/work re-entry, therapeutic activities, therapeutic exercises, therapeutic groups, neuromuscular re-education, wheelchair management/training  Plan of Care expires: 03/14/17  Plan of Care reviewed with: patient         Wendy Steele, OT  03/02/2017

## 2017-03-03 NOTE — PLAN OF CARE
Problem: Physical Therapy Goal  Goal: Physical Therapy Goal  Goals to be met by: 3/17/2017     Patient will increase functional independence with mobility by performin). Supine to sit with Modified [place]  2). Sit to supine with Modified [place]  3). Sit to stand transfer with Stand-by Assistance  4). Bed to chair transfer with Stand-by Assistance   5). Gait x > 150 feet with Stand-by Assistance   6). Wheelchair propulsion x > 200 feet with Modified [place]   Outcome: Ongoing (interventions implemented as appropriate)  Patient participated in gait training, therapeutic exercise and activities to improve functional mobility, improve ADL's and promote safe ambulation to decrease fall risk.

## 2017-03-03 NOTE — PT/OT/SLP PROGRESS
Physical Therapy         Treatment        Forest Villanueva Jr.   MRN: [id number]     PT Received On: 17  Total Time (min): 15        Billable Minutes:  Therapeutic Activity 15  Total Minutes: 15    Treatment Type: Treatment  PT/PTA: PT     PTA Visit Number: 0       General Precautions: Standard, fall  Orthopedic Precautions: Orthopedic Precautions : N/A        Subjective:  Pain Ratin/10              Pain Rating Post-Intervention: 0/10    Objective:  Patient found seated in w/c, in NAD.      Functional Mobility:  Bed Mobility:   Supine to sit: Standby Assistance   Sit to supine: Standby Assistance x 2 reps    Transfer Training:  Sit to stand:Stand-by Assistance with No Assistive Device  x 2  Bed <> Chair:  Stand Pivot with Stand-by Assistance with No Assistive Device      Gait Trainin' x 1 with RW with CG/SBA and cues      Additional Treatment:  Reassessed function and LE strength.    Activity Tolerance:  Patient tolerated treatment well    Patient left supine with call button in reach and bed alarm on.    Assessment:  Forest Villanueva Jr. is a 71 y.o. male     Rehab potential is good.    Activity tolerance: Good    GOALS:   Physical Therapy Goals        Problem: Physical Therapy Goal    Goal Priority Disciplines Outcome Goal Variances Interventions   Physical Therapy Goal     PT/OT, PT Ongoing (interventions implemented as appropriate)     Description:  Goals to be met by: 3/17/2017     Patient will increase functional independence with mobility by performin). Supine to sit with Modified [place]  2). Sit to supine with Modified [place]  3). Sit to stand transfer with Stand-by Assistance  4). Bed to chair transfer with Stand-by Assistance   5). Gait  x > 150 feet with Stand-by Assistance   6). Wheelchair propulsion x > 200 feet with Modified [place]               PLAN:    Patient to be seen daily  to address the above listed problems via gait training, therapeutic activities,  therapeutic exercises, neuromuscular re-education, wheelchair management/training  Plan of Care expires: 03/17/17  Plan of Care reviewed with: patient         Christ T Chapo, PT 3/3/2017

## 2017-03-03 NOTE — PLAN OF CARE
Problem: Patient Care Overview  Goal: Plan of Care Review  Outcome: Ongoing (interventions implemented as appropriate)  Lopressor held due to low heart rate. 50 beats a minute. bp 127/68. No c/o pain. Urinal in reach and bed alarm on. Eye gtt applied to alma eyes. Sleeping on rounds. Cont plan of care.

## 2017-03-03 NOTE — PLAN OF CARE
Problem: SLP Goal  Goal: SLP Goal  Short Term Goals modified 2/23/2017:  1. Patient will state compensatory strategies for oral clearance/safe swallow strategies and demonstrate use of strategies MIN A  2. Patient will tolerate Level II Dysphagia DIet (finely chopped meats) with NECTAR-thickened liquids w/o overt S/S aspiration, MIN A, to improve swallow  3. Patient will complete OMEs x20 ea, MIN A, to improve R lingual/labial coordination and strength  4. Patient will attend to items on R side of tray with MIN A for demonstrated use of compensatory strategies for visiospatial skills  5. Patient will name 8 - 10 items/minute in a concrete category, MIN A, to improve word-finding and response time   6. Patient will demo clear speech strategies at the conversational level 90% of time or more, MIN A, to improve speech  7. Patient will recall 3/3 related items for imm, and post 3 minute filled delay, MIN A, to improve memory- MET  Patient will recall details of picture scenes after 5 minutes using learned memory strategies with MIN A  8. NEW: Patient will tolerate trials of thin liquids w/o overt S/S aspiration, MIN A for use of safe swallow strategies, to improve swallow   Long Term Goals:  1. Patient will tolerate least restrictive diet without S/S aspiration, and good oral clearance, Supervision  2. Educate Patient and family on POC and compensatory strategies for safe swallow   3. Patient will use functional memory strategies to recall events of the day and safely complete ADLs, with Supervision   Outcome: Ongoing (interventions implemented as appropriate)  Patient completed simple visual scanning task with SB assist.  He recalled 3/3 related words after a 3 minute filled delay with MOD assist and cues to utilize rehearsal as a strategy.

## 2017-03-04 PROCEDURE — 63600175 PHARM REV CODE 636 W HCPCS: Performed by: PHYSICAL MEDICINE & REHABILITATION

## 2017-03-04 PROCEDURE — 12800000 HC REHAB SEMI-PRIVATE ROOM

## 2017-03-04 PROCEDURE — 97530 THERAPEUTIC ACTIVITIES: CPT

## 2017-03-04 PROCEDURE — 97110 THERAPEUTIC EXERCISES: CPT

## 2017-03-04 PROCEDURE — 25000003 PHARM REV CODE 250: Performed by: PHYSICAL MEDICINE & REHABILITATION

## 2017-03-04 PROCEDURE — 63700000 PHARM REV CODE 250 ALT 637 W/O HCPCS: Performed by: PHYSICAL MEDICINE & REHABILITATION

## 2017-03-04 RX ADMIN — ASPIRIN 81 MG: 81 TABLET, COATED ORAL at 10:03

## 2017-03-04 RX ADMIN — ENOXAPARIN SODIUM 40 MG: 100 INJECTION SUBCUTANEOUS at 11:03

## 2017-03-04 RX ADMIN — DORZOLAMIDE HYDROCHLORIDE AND TIMOLOL MALEATE 1 DROP: 20; 5 SOLUTION/ DROPS OPHTHALMIC at 10:03

## 2017-03-04 RX ADMIN — DOCUSATE SODIUM 100 MG: 100 CAPSULE, LIQUID FILLED ORAL at 10:03

## 2017-03-04 RX ADMIN — CLOPIDOGREL BISULFATE 75 MG: 75 TABLET ORAL at 10:03

## 2017-03-04 RX ADMIN — DORZOLAMIDE HYDROCHLORIDE AND TIMOLOL MALEATE 1 DROP: 20; 5 SOLUTION/ DROPS OPHTHALMIC at 09:03

## 2017-03-04 RX ADMIN — BRIMONIDINE TARTRATE 1 DROP: 1 SOLUTION/ DROPS OPHTHALMIC at 10:03

## 2017-03-04 RX ADMIN — LATANOPROST 1 DROP: 50 SOLUTION OPHTHALMIC at 09:03

## 2017-03-04 RX ADMIN — BRIMONIDINE TARTRATE 1 DROP: 1 SOLUTION/ DROPS OPHTHALMIC at 09:03

## 2017-03-04 NOTE — PROGRESS NOTES
"REHAB FOLLOWUP NOTE    CHIEF COMPLAINT:  New onset left-sided CVA with right-sided weakness and   dysarthria for inpatient rehab.    HISTORY OF PRESENT ILLNESS:  A 71-year-old gentleman with past medical history   significant for CVA, hypertension, experienced some right-sided weakness, did   have a neuro workup done consistent with a new onset left-sided CVA with right   hemiparesis.  The patient also with history of glaucoma, coronary artery   disease, status post CABG, pacemaker in the past and needing a battery change.    PHYSICAL EXAMINATION:    VITAL SIGNS:   Temp: 97.3 °F (36.3 °C) (03/04/17 0458)  Pulse: (!) 52 (03/04/17 1008)  Resp: 18 (03/04/17 0458)  BP: 107/76 (03/04/17 1008)  SpO2: 99 % (03/04/17 0458)  Weight: 67.4 kg (148 lb 8 oz) (02/27/17 0532)  Height: 5' 6" (167.6 cm) (02/14/17 1915)    Lab Results   Component Value Date    WBC 6.40 02/21/2017    HGB 14.3 02/21/2017    HCT 44.0 02/21/2017     02/21/2017    ALT 14 02/21/2017    AST 15 02/21/2017     02/27/2017    K 4.2 02/27/2017     02/27/2017    CREATININE 1.1 02/27/2017    BUN 20 02/27/2017    CO2 25 02/27/2017       GENERAL:  The patient on exam today is alert.  LUNGS:  Clear to auscultation.  HEART:  With a regular rate and rhythm.  CHEST:  Chest wall pacer site clean and dry.  GENITOURINARY:  He has been continent of bladder and bowel.  EXTREMITIES:  With functional active to passive range of motion with a fair   motor strength throughout.  No edema or calf tenderness noted.      IMPRESSION:  1.  New onset left-sided CVA with right hemiparesis.  2.  History of old CVA without any residual weakness, history of hypertension,   history of coronary artery disease with CABG in the past, history of pacemaker   implant.    RECOMMENDATIONS:  Continue with the current PT/OT, speech and nursing care.            "

## 2017-03-04 NOTE — PLAN OF CARE
Problem: Patient Care Overview  Goal: Plan of Care Review  Outcome: Ongoing (interventions implemented as appropriate)  Alert and oriented, continent of B&B this shift, quiet calm and cooperative, denies pain, steri strips intact to L chest wal pacemaker site

## 2017-03-04 NOTE — PLAN OF CARE
Problem: Physical Therapy Goal  Goal: Physical Therapy Goal  Goals to be met by: 3/17/2017     Patient will increase functional independence with mobility by performin). Supine to sit with Modified [place]  2). Sit to supine with Modified [place]  3). Sit to stand transfer with Stand-by Assistance  4). Bed to chair transfer with Stand-by Assistance   5). Gait x > 150 feet with Stand-by Assistance   6). Wheelchair propulsion x > 200 feet with Modified [place]   Outcome: Ongoing (interventions implemented as appropriate)  Bed mobility with SBA and VC's for technique, bed to chair t/f with CGA, WC 200ft with SBA for technique

## 2017-03-04 NOTE — PT/OT/SLP PROGRESS
Physical Therapy         Treatment        Forest Villanueva Jr.   MRN: [id number]     PT Received On: 17  Total Time (min): 26        Billable Minutes:  Therapeutic Activity 18 and Therapeutic Exercise 6  Total Minutes: 26    Treatment Type: Treatment  PT/PTA: PTA     PTA Visit Number: 1       General Precautions: Standard, fall  Orthopedic Precautions: Orthopedic Precautions : N/A   Braces:           Subjective:  Communicated with patient and nursing  prior to session.    Pain Ratin/10              Pain Rating Post-Intervention: 0/10    Objective:  Patient found L s/l in bed, curled up with blankets covering him completely, with Patient found with: bed alarm    Functional Mobility:  Bed Mobility:   Supine to sit: Contact Guard Assistance   Sit to supine: Activity did not occur   Rolling: Contact Guard Assistance   Scooting: Activity did not occur    Balance:   Static Sit: FAIR: Maintains without assist, but unable to take any challenges   Dynamic Sit:  FAIR: Cannot move trunk without losing balance  Static Stand: POOR: Needs MODERATE assist to maintain  Dynamic stand: FAIR: Needs CONTACT GUARD during gait    Transfer Training:  Sit to stand:Contact Guard Assistance with oushing up from EOB 1x    Wheelchair Training:  Pt propelled Standard wheelchair x 200 feet on Level tile with  Bilateral upper extremity and Bilateral lower extremity with Stand-by Assistance. With VC's for technique.    Gait Training:    Stair Training:      Additional Treatment:  SciFit 5' L2    Patient then reported BM on self and propelled self back to room, Nurse Sandrita notified of patients BM and he needed to be cleaned up    Activity Tolerance:  Treatment limited by BM on self without informing PTA he needed to go to the restroom    Patient left up in chair with call button in reach and chair alarm on.    Assessment:  Forest Villanueva Jr. is a 71 y.o. male with a medical diagnosis of <principal problem not specified>. He presents with  weakness. req'd assist to transfer, maintain balance, and increase endurance.    Rehab potential is fair.    Activity tolerance: Fair    Discharge recommendations:       Equipment recommendations:       GOALS:   Physical Therapy Goals        Problem: Physical Therapy Goal    Goal Priority Disciplines Outcome Goal Variances Interventions   Physical Therapy Goal     PT/OT, PT Ongoing (interventions implemented as appropriate)     Description:  Goals to be met by: 3/17/2017     Patient will increase functional independence with mobility by performin). Supine to sit with Modified [place]  2). Sit to supine with Modified [place]  3). Sit to stand transfer with Stand-by Assistance  4). Bed to chair transfer with Stand-by Assistance   5). Gait  x > 150 feet with Stand-by Assistance   6). Wheelchair propulsion x > 200 feet with Modified [place]               PLAN:    Patient to be seen daily  to address the above listed problems via gait training, therapeutic activities, therapeutic exercises  Plan of Care expires: 17  Plan of Care reviewed with: patient         Patricia Dawsoney, PTA 3/4/2017

## 2017-03-05 PROCEDURE — 63700000 PHARM REV CODE 250 ALT 637 W/O HCPCS: Performed by: PHYSICAL MEDICINE & REHABILITATION

## 2017-03-05 PROCEDURE — 25000003 PHARM REV CODE 250: Performed by: PHYSICAL MEDICINE & REHABILITATION

## 2017-03-05 PROCEDURE — 63600175 PHARM REV CODE 636 W HCPCS: Performed by: PHYSICAL MEDICINE & REHABILITATION

## 2017-03-05 PROCEDURE — 12800000 HC REHAB SEMI-PRIVATE ROOM

## 2017-03-05 RX ADMIN — ENOXAPARIN SODIUM 40 MG: 100 INJECTION SUBCUTANEOUS at 11:03

## 2017-03-05 RX ADMIN — BRIMONIDINE TARTRATE 1 DROP: 1 SOLUTION/ DROPS OPHTHALMIC at 09:03

## 2017-03-05 RX ADMIN — ASPIRIN 81 MG: 81 TABLET, COATED ORAL at 09:03

## 2017-03-05 RX ADMIN — LATANOPROST 1 DROP: 50 SOLUTION OPHTHALMIC at 09:03

## 2017-03-05 RX ADMIN — CLOPIDOGREL BISULFATE 75 MG: 75 TABLET ORAL at 09:03

## 2017-03-05 RX ADMIN — DORZOLAMIDE HYDROCHLORIDE AND TIMOLOL MALEATE 1 DROP: 20; 5 SOLUTION/ DROPS OPHTHALMIC at 09:03

## 2017-03-05 RX ADMIN — METOPROLOL TARTRATE 25 MG: 25 TABLET ORAL at 09:03

## 2017-03-05 RX ADMIN — DOCUSATE SODIUM 100 MG: 100 CAPSULE, LIQUID FILLED ORAL at 09:03

## 2017-03-05 NOTE — PLAN OF CARE
Problem: Fall Risk (Adult)  Goal: Identify Related Risk Factors and Signs and Symptoms  Related risk factors and signs and symptoms are identified upon initiation of Human Response Clinical Practice Guideline (CPG)   Outcome: Ongoing (interventions implemented as appropriate)  Patient instructed to call for assistance prior to getting OOB/chair. Fall prevention protocol in effect.

## 2017-03-05 NOTE — PLAN OF CARE
Problem: Fall Risk (Adult)  Goal: Absence of Falls  Patient will demonstrate the desired outcomes by discharge/transition of care.   Outcome: Ongoing (interventions implemented as appropriate)  Patient with no falls this admit.

## 2017-03-05 NOTE — PLAN OF CARE
Problem: Patient Care Overview  Goal: Plan of Care Review  Outcome: Ongoing (interventions implemented as appropriate)  Patient AOx3, continues with slow verbal responses, incontinent episodes when OOB. Denies c/o pain. No distress noted.

## 2017-03-05 NOTE — PLAN OF CARE
Problem: Pressure Ulcer Risk (Reed Scale) (Adult,Obstetrics,Pediatric)  Goal: Skin Integrity  Patient will demonstrate the desired outcomes by discharge/transition of care.   Outcome: Ongoing (interventions implemented as appropriate)  Skin remains intact.

## 2017-03-06 LAB
ALBUMIN SERPL BCP-MCNC: 3.5 G/DL
ALP SERPL-CCNC: 65 U/L
ALT SERPL W/O P-5'-P-CCNC: 33 U/L
ANION GAP SERPL CALC-SCNC: 8 MMOL/L
ANISOCYTOSIS BLD QL SMEAR: SLIGHT
AST SERPL-CCNC: 23 U/L
BASOPHILS # BLD AUTO: 0 K/UL
BASOPHILS NFR BLD: 0.6 %
BILIRUB SERPL-MCNC: 0.3 MG/DL
BUN SERPL-MCNC: 25 MG/DL
CALCIUM SERPL-MCNC: 9.8 MG/DL
CHLORIDE SERPL-SCNC: 108 MMOL/L
CO2 SERPL-SCNC: 24 MMOL/L
CREAT SERPL-MCNC: 1.2 MG/DL
DIFFERENTIAL METHOD: ABNORMAL
EOSINOPHIL # BLD AUTO: 0.1 K/UL
EOSINOPHIL NFR BLD: 1.8 %
ERYTHROCYTE [DISTWIDTH] IN BLOOD BY AUTOMATED COUNT: 15 %
EST. GFR  (AFRICAN AMERICAN): >60 ML/MIN/1.73 M^2
EST. GFR  (NON AFRICAN AMERICAN): >60 ML/MIN/1.73 M^2
GIANT PLATELETS BLD QL SMEAR: PRESENT
GLUCOSE SERPL-MCNC: 96 MG/DL
HCT VFR BLD AUTO: 45.6 %
HGB BLD-MCNC: 14.7 G/DL
LYMPHOCYTES # BLD AUTO: 2.6 K/UL
LYMPHOCYTES NFR BLD: 46.4 %
MCH RBC QN AUTO: 27.5 PG
MCHC RBC AUTO-ENTMCNC: 32.2 %
MCV RBC AUTO: 85 FL
MONOCYTES # BLD AUTO: 0.5 K/UL
MONOCYTES NFR BLD: 8.2 %
NEUTROPHILS # BLD AUTO: 2.4 K/UL
NEUTROPHILS NFR BLD: 43 %
PLATELET # BLD AUTO: 215 K/UL
PLATELET BLD QL SMEAR: ABNORMAL
PMV BLD AUTO: 10.9 FL
POTASSIUM SERPL-SCNC: 4.5 MMOL/L
PROT SERPL-MCNC: 6.9 G/DL
RBC # BLD AUTO: 5.35 M/UL
SODIUM SERPL-SCNC: 140 MMOL/L
WBC # BLD AUTO: 5.6 K/UL

## 2017-03-06 PROCEDURE — 97110 THERAPEUTIC EXERCISES: CPT

## 2017-03-06 PROCEDURE — 92526 ORAL FUNCTION THERAPY: CPT

## 2017-03-06 PROCEDURE — 36415 COLL VENOUS BLD VENIPUNCTURE: CPT

## 2017-03-06 PROCEDURE — 97532 *HC SP COG SKL DEV EA 15 MIN: CPT

## 2017-03-06 PROCEDURE — 63700000 PHARM REV CODE 250 ALT 637 W/O HCPCS: Performed by: PHYSICAL MEDICINE & REHABILITATION

## 2017-03-06 PROCEDURE — 63600175 PHARM REV CODE 636 W HCPCS: Performed by: PHYSICAL MEDICINE & REHABILITATION

## 2017-03-06 PROCEDURE — 85025 COMPLETE CBC W/AUTO DIFF WBC: CPT

## 2017-03-06 PROCEDURE — 12800000 HC REHAB SEMI-PRIVATE ROOM

## 2017-03-06 PROCEDURE — 97530 THERAPEUTIC ACTIVITIES: CPT

## 2017-03-06 PROCEDURE — 97535 SELF CARE MNGMENT TRAINING: CPT

## 2017-03-06 PROCEDURE — 25000003 PHARM REV CODE 250: Performed by: PHYSICAL MEDICINE & REHABILITATION

## 2017-03-06 PROCEDURE — 80053 COMPREHEN METABOLIC PANEL: CPT

## 2017-03-06 PROCEDURE — 97116 GAIT TRAINING THERAPY: CPT

## 2017-03-06 PROCEDURE — 97542 WHEELCHAIR MNGMENT TRAINING: CPT

## 2017-03-06 RX ADMIN — ASPIRIN 81 MG: 81 TABLET, COATED ORAL at 09:03

## 2017-03-06 RX ADMIN — BRIMONIDINE TARTRATE 1 DROP: 1 SOLUTION/ DROPS OPHTHALMIC at 10:03

## 2017-03-06 RX ADMIN — ENOXAPARIN SODIUM 40 MG: 100 INJECTION SUBCUTANEOUS at 12:03

## 2017-03-06 RX ADMIN — LACTULOSE 20 G: 10 SOLUTION ORAL at 09:03

## 2017-03-06 RX ADMIN — CLOPIDOGREL BISULFATE 75 MG: 75 TABLET ORAL at 09:03

## 2017-03-06 RX ADMIN — DORZOLAMIDE HYDROCHLORIDE AND TIMOLOL MALEATE 1 DROP: 20; 5 SOLUTION/ DROPS OPHTHALMIC at 09:03

## 2017-03-06 RX ADMIN — DOCUSATE SODIUM 100 MG: 100 CAPSULE, LIQUID FILLED ORAL at 09:03

## 2017-03-06 RX ADMIN — LATANOPROST 1 DROP: 50 SOLUTION OPHTHALMIC at 10:03

## 2017-03-06 RX ADMIN — BRIMONIDINE TARTRATE 1 DROP: 1 SOLUTION/ DROPS OPHTHALMIC at 09:03

## 2017-03-06 RX ADMIN — DORZOLAMIDE HYDROCHLORIDE AND TIMOLOL MALEATE 1 DROP: 20; 5 SOLUTION/ DROPS OPHTHALMIC at 10:03

## 2017-03-06 NOTE — PLAN OF CARE
Problem: Self-Care Deficit (Adult,Obstetrics,Pediatric)  Goal: Identify Related Risk Factors and Signs and Symptoms  Related risk factors and signs and symptoms are identified upon initiation of Human Response Clinical Practice Guideline (CPG)   Outcome: Ongoing (interventions implemented as appropriate)  Patient needing assistance with ADL's due to weakness and mental slowness since CVA.

## 2017-03-06 NOTE — PLAN OF CARE
Problem: Mobility, Physical Impaired (Adult)  Goal: Identify Related Risk Factors and Signs and Symptoms  Related risk factors and signs and symptoms are identified upon initiation of Human Response Clinical Practice Guideline (CPG)   Outcome: Ongoing (interventions implemented as appropriate)  Patient with generalized weakness, unsteadiness, slowness to respond since CVA. Progressing with PT/OT/ST

## 2017-03-06 NOTE — PLAN OF CARE
Problem: Patient Care Overview  Goal: Plan of Care Review  Outcome: Ongoing (interventions implemented as appropriate)  Patient with flat affect and slow to respond to questions. Eating 50 to 75% meals with boost at all meals. Using urinal while in bed, but incontinent when up in chair. 1 person assist to transfer.

## 2017-03-06 NOTE — PLAN OF CARE
Problem: Pressure Ulcer Risk (Reed Scale) (Adult,Obstetrics,Pediatric)  Goal: Skin Integrity  Patient will demonstrate the desired outcomes by discharge/transition of care.   Outcome: Ongoing (interventions implemented as appropriate)  Skin intact at present.

## 2017-03-06 NOTE — PLAN OF CARE
Problem: Patient Care Overview  Goal: Plan of Care Review  Outcome: Ongoing (interventions implemented as appropriate)  Patient alert and oriented. Assist x 1 for transfers. Patient with flat affect and slow to respond to questions. Fair appetite. Patient prefers to be in bed rather than up in chair or in therapy during day.

## 2017-03-06 NOTE — PLAN OF CARE
Problem: Nutrition, Imbalanced: Inadequate Oral Intake (Adult)  Goal: Identify Related Risk Factors and Signs and Symptoms  Related risk factors and signs and symptoms are identified upon initiation of Human Response Clinical Practice Guideline (CPG)   Outcome: Ongoing (interventions implemented as appropriate)  Patient with decreased appetite, but taking boost with each meal.

## 2017-03-06 NOTE — PLAN OF CARE
Problem: Fall Risk (Adult)  Goal: Identify Related Risk Factors and Signs and Symptoms  Related risk factors and signs and symptoms are identified upon initiation of Human Response Clinical Practice Guideline (CPG)   Outcome: Ongoing (interventions implemented as appropriate)  Patient with risk factors. Patient aware of fall prevention policy and to call for assistance prior to getting OOB/chair. Safety alarms/belts in use.

## 2017-03-06 NOTE — PLAN OF CARE
Problem: Fall Risk (Adult)  Goal: Absence of Falls  Patient will demonstrate the desired outcomes by discharge/transition of care.   Outcome: Ongoing (interventions implemented as appropriate)  Patient with no falls this admit

## 2017-03-06 NOTE — PLAN OF CARE
Problem: Mobility, Physical Impaired (Adult)  Goal: Enhanced Functionality Ability  Patient will demonstrate the desired outcomes by discharge/transition of care.   Outcome: Ongoing (interventions implemented as appropriate)  Patient with generalized weakness and continued fatigue. Progressing with PT/OT/ST therapies.

## 2017-03-06 NOTE — PLAN OF CARE
Problem: SLP Goal  Goal: SLP Goal  Short Term Goals modified 2/23/2017:  1. Patient will state compensatory strategies for oral clearance/safe swallow strategies and demonstrate use of strategies MIN A  2. Patient will tolerate Level II Dysphagia DIet (finely chopped meats) with NECTAR-thickened liquids w/o overt S/S aspiration, MIN A, to improve swallow  3. Patient will complete OMEs x20 ea, MIN A, to improve R lingual/labial coordination and strength  4. Patient will attend to items on R side of tray with MIN A for demonstrated use of compensatory strategies for visiospatial skills  5. Patient will name 8 - 10 items/minute in a concrete category, MIN A, to improve word-finding and response time   6. Patient will demo clear speech strategies at the conversational level 90% of time or more, MIN A, to improve speech  7. Patient will recall 3/3 related items for imm, and post 3 minute filled delay, MIN A, to improve memory- MET  Patient will recall details of picture scenes after 5 minutes using learned memory strategies with MIN A  8. NEW: Patient will tolerate trials of thin liquids w/o overt S/S aspiration, MIN A for use of safe swallow strategies, to improve swallow   Long Term Goals:  1. Patient will tolerate least restrictive diet without S/S aspiration, and good oral clearance, Supervision  2. Educate Patient and family on POC and compensatory strategies for safe swallow   3. Patient will use functional memory strategies to recall events of the day and safely complete ADLs, with Supervision   Outcome: Ongoing (interventions implemented as appropriate)  1. N/a  2. Pt tolerated Thin liquids today w/o s/s aspiration. Upgraded diet to thin liquids. See Goal 8.  3. N/a  4. N/a  5. On MOCA - pt able to name 2 items in given category  6. Pt needed mod A throughout session for increased volume  7. Pt unable to recall any items from list of five with filled delay  8. MET this day. Continue monitoring for  consistency

## 2017-03-06 NOTE — PLAN OF CARE
Problem: Physical Therapy Goal  Goal: Physical Therapy Goal  Goals to be met by: 3/17/2017     Patient will increase functional independence with mobility by performin). Supine to sit with Modified [place]  2). Sit to supine with Modified [place]  3). Sit to stand transfer with Stand-by Assistance  4). Bed to chair transfer with Stand-by Assistance   5). Gait x > 150 feet with Stand-by Assistance   6). Wheelchair propulsion x > 200 feet with Modified [place]   Outcome: Ongoing (interventions implemented as appropriate)  PT for gait training, w/c mobility and BLE therex in standing and sitting.

## 2017-03-06 NOTE — PROGRESS NOTES
Progress Note  Physical Medicine & Rehab    Admit Date: 2/14/2017   LOS: 20 days     SUBJECTIVE:     Follow-up For:  Daily round     Review of Systems:  denies cp, sob or abdominal discomfort     OBJECTIVE:     Vital Signs (Most Recent)  Temp: (!) 48.2 °F (9 °C) (03/06/17 0700)  Pulse: 60 (03/06/17 0508)  Resp: 18 (03/06/17 0508)  BP: (!) 141/82 (03/06/17 0508)  SpO2: 99 % (03/06/17 0508)    Vital Signs Range (Last 24H):  Temp:  [48.2 °F (9 °C)-98 °F (36.7 °C)]   Pulse:  [50-60]   Resp:  [18]   BP: (106-141)/(42-82)   SpO2:  [99 %]     I & O (Last 24H):  Intake/Output Summary (Last 24 hours) at 03/06/17 0746  Last data filed at 03/06/17 0656   Gross per 24 hour   Intake              720 ml   Output              500 ml   Net              220 ml     Physical Exam:  Calm, cooperative in no distress  Lung: CTA B  Heart: rrr no m  Abd: soft, NTND + BS  EXT: no edema  Skin: intact   Laboratory:  Recent Results (from the past 72 hour(s))   Comprehensive metabolic panel    Collection Time: 03/06/17  6:16 AM   Result Value Ref Range    Sodium 140 136 - 145 mmol/L    Potassium 4.5 3.5 - 5.1 mmol/L    Chloride 108 95 - 110 mmol/L    CO2 24 23 - 29 mmol/L    Glucose 96 70 - 110 mg/dL    BUN, Bld 25 (H) 8 - 23 mg/dL    Creatinine 1.2 0.5 - 1.4 mg/dL    Calcium 9.8 8.7 - 10.5 mg/dL    Total Protein 6.9 6.0 - 8.4 g/dL    Albumin 3.5 3.5 - 5.2 g/dL    Total Bilirubin 0.3 0.1 - 1.0 mg/dL    Alkaline Phosphatase 65 55 - 135 U/L    AST 23 10 - 40 U/L    ALT 33 10 - 44 U/L    Anion Gap 8 8 - 16 mmol/L    eGFR if African American >60 >60 mL/min/1.73 m^2    eGFR if non African American >60 >60 mL/min/1.73 m^2       Diagnostic Results:  no new imaging     ASSESSMENT/PLAN:     Active Hospital Problems    Diagnosis  POA    Mild malnutrition [E44.1]  Yes    CVA (cerebral vascular accident) [I63.9]  Yes      Resolved Hospital Problems    Diagnosis Date Resolved POA   No resolved problems to display.       CVA cont PT, OT, ST    Anticoagulation, cont asa & Plavix  DVT prophylaxis, cont sq Lovenox  HTN: vitals noted, cont current meds  CBC report pending

## 2017-03-06 NOTE — PT/OT/SLP PROGRESS
Occupational Therapy  Treatment    Forest Villanueva Jr.   MRN: [id number]   Admitting Diagnosis: CVA    OT Date of Treatment: 17   Total Time (min): 75 min      Billable Minutes:  Self Care/Home Management 45 and Therapeutic Activity 30  Total Minutes: 75    General Precautions: Standard, fall, pacemaker, nectar thick  Orthopedic Precautions: N/A  Braces: N/A    Do you have any cultural, spiritual, Nondenominational conflicts, given your current situation?: Yazdanism    Subjective:  Communicated with nurseSandrita, prior to session.    Pain Ratin/10     Pain Rating Post-Intervention: 0/10    Objective:  Patient found with:  (up in wheelchair in room with chair alarm on and nurse present)    Functional Mobility:  Transfer Training:   Sit to stand:Minimal Assistance with Grab bars once standing with verbal instruction for proper hand placement and safety awareness  Tub bench transfer Stand Pivot with Minimal Assistance with Grab bars once standing with step by step verbal instruction for proper hand placement and safety awareness    Grooming:  Supervision sitting sink side to comb hair, brush teeth, and wash hands    Bathing:  Patient performed bathing with Contact Guard Assistance with grab bar, Handheld shower head and tub bench at Shower.  >>pacemaker incision covered and kept dry throughout bathing tasks    UE Dressing:  Patient performed UE Dressing with Set-up Assistance to don/doff t-shirt and Min (A) to don/doff zip up jacket as pull over with no AE at Wheelchair.    LE Dressing:  Patient don/doffed socks with Supervision or Set-up Assistance, Patient don/doffed shoes with Supervision or Set-up Assistance, Patient don/doffed adult brief with Contact Guard Assistance and Patient don/doffed pants with Contact Guard Assistance - standing with L UE support of grab bar    Balance:   Static Sit: GOOD+: Takes MAXIMAL challenges from all directions.    Dynamic Sit:  GOOD: Maintains balance through MODERATE excursions of  active trunk movement  Static Stand: GOOD-: Takes MODERATE challenges from all directions inconsistently  Dynamic stand: FAIR: Needs CONTACT GUARD during gait    Additional Treatment:  - Wheelchair mobility > 50 ft with Supervision  - Yellow thera-putty for 10 item removal to facilitate bilateral digit coordination, digit strengthening with extra time    Patient left up in chair with chair alarm on, nurse notified and seated up in wheelchair in pt common area within 10ft nurses station    ASSESSMENT:  Forest Villanueva Jr. is a 71 y.o. male with a medical diagnosis of CVA and presents with overall Min (A) to perform self care tasks and functional transfers. Patient with delayed responses and requires step by step instruction for proper hand placement during functional transfers as well as decreased initiation of functional tasks during today's session. Nurse notified. Patient should continue to benefit from skilled OT services per est POC to increase functional independence, mobility, and safety.    Rehab potential is good    Activity tolerance: Good    Discharge recommendations: home     Equipment recommendations:  (TBD; likely BSC and transfer tub bench)     GOALS:   Occupational Therapy Goals        Problem: Occupational Therapy Goal    Goal Priority Disciplines Outcome Interventions   Occupational Therapy Goal     OT, PT/OT Ongoing (interventions implemented as appropriate)    Description:  Goals to be met by: 3/14/17     Patient will increase functional independence with ADLs by performing:    Feeding with Set-up Assistance.  UE Dressing with Set-up Assistance.  LE Dressing with Set-up Assistance.  Grooming while seated with Set-up Assistance.  Toileting from toilet with Set-up Assistance for hygiene and clothing management.   Bathing from  shower chair/bench with Set-up Assistance.  Shower transfer with Supervision.  Toilet transfer to toilet with Supervision.                Plan:  Patient to be seen 6 x/week to  address the above listed problems via self-care/home management, community/work re-entry, therapeutic activities, therapeutic exercises, therapeutic groups, neuromuscular re-education, wheelchair management/training  Plan of Care expires: 03/14/17  Plan of Care reviewed with: patient         Roseann Jeffrey, OT  03/06/2017

## 2017-03-06 NOTE — PT/OT/SLP PROGRESS
Physical Therapy         Treatment        Forest Villanueva Jr.   MRN: [id number]     PT Received On: 17  Total Time (min): 75        Billable Minutes:  Gait [id number], Therapeutic Activity 0, Therapeutic Exercise 50 and Train/Wheelchair Management 15  Total Minutes: 75    Treatment Type: Treatment  PT/PTA: PTA     PTA Visit Number: 2       General Precautions: Standard, fall, pacemaker, nectar thick  Orthopedic Precautions: Orthopedic Precautions : N/A   Braces: Braces: N/A         Subjective:  Communicated with nursing prior to session.    Pt agreeable to session. Pt initially stating that he did not want to participate in gait training. Pt agreeable after encouragement/coaxing.   Pain Ratin/10              Pain Rating Post-Intervention: 0/10    Objective:  Patient found seated in w/c, with Patient found with:  (N/A)    Functional Mobility:  Bed Mobility: did not occur    Balance:   Static Stand: FAIR: Maintains without assist but unable to take challenges  Dynamic stand: FAIR+: Needs CLOSE SUPERVISION during gait and is able to right self with minor LOB    Transfer Training:  Sit to stand:Contact Guard Assistance with Rolling Walker .    Wheelchair Training:  Pt propelled Standard wheelchair x 2 x 150' feet on Level tile with  Bilateral lower extremity with Supervision or Set-up Assistance and cues for obstacle avoidance.     Gait Trainin' with CGA/SBA using RW. Slight knee buckling 2' fatigue noted towards end of gait trial. Increased time required 2' slow pace.    Additional Treatment:  Seated BLE therex: HR/TR, ankle df with blue tb, LAQ and marching with 2# B, hip abd and hs curls with blue tb, hip add ball squeeze 3 x 10 reps each with a very slow pace    Standing BLE therex: calf raises, toe raises 3 x 10 reps each    SciFit BLE only: lv 2 x 12'    Activity Tolerance:  Patient tolerated treatment well    Patient left up in chair with all lines intact, chair alarm on and nursing present  notified.    Assessment:  Forest Villanueva Jr. is a 71 y.o. male with a medical diagnosis of <principal problem not specified>. He presents with impaired functional mobility/independence 2' weakness, R hemiparesis, decreased balance/stability, impaired motor control/planning, decreased endurance/activity tolerance and decreased safety awareness. Pt continues to require increased time to complete all tasks 2' slow pace and delayed processing.      Rehab potential is fair.      Activity tolerance: Good/Fair    GOALS:   Physical Therapy Goals        Problem: Physical Therapy Goal    Goal Priority Disciplines Outcome Goal Variances Interventions   Physical Therapy Goal     PT/OT, PT Ongoing (interventions implemented as appropriate)     Description:  Goals to be met by: 3/17/2017     Patient will increase functional independence with mobility by performin). Supine to sit with Modified [place]  2). Sit to supine with Modified [place]  3). Sit to stand transfer with Stand-by Assistance  4). Bed to chair transfer with Stand-by Assistance   5). Gait  x > 150 feet with Stand-by Assistance   6). Wheelchair propulsion x > 200 feet with Modified [place]               PLAN:    Patient to be seen daily  to address the above listed problems via gait training, therapeutic activities, therapeutic exercises  Plan of Care expires: 17  Plan of Care reviewed with: patient         Arleen Bryant, PTA 3/6/2017

## 2017-03-06 NOTE — PT/OT/SLP PROGRESS
Speech Language Pathology Treatment          Forest Villanueva Jr.   MRN: [id number]     Diet recommendations: Solid Diet Level: Other (see comments) (dysphagia diet 2)  Liquid Diet Level: Thin Feed only when awake/alert, No straws, Small bites/sips, Alternating bites/sips and Assistance with meals    SLP Treatment Date: 17  Speech Start Time: 1330     Speech Stop Time: 1400     Speech Total (min): 30 min       TREATMENT BILLABLE MINUTES:  Speech Therapy Individual 20 and Treatment Swallowing Dysfunction 10    General Precautions: Standard, fall, pacemaker          Subjective:  Pt was resting upon SLP arrival to room. He agreed to therapy but needed mod-max prompting to participate. Pt appeared distant and near tears at times.     Objective:   Patient found in bed, with  rails up, call button at his side.   Pt progress assessed this day with bedside evaluation of swallow with thin liquids and with MOCA.     Pain Ratin/10   1. Patient will state compensatory strategies for oral clearance/safe swallow strategies and demonstrate use of strategies MIN A  2. Patient will tolerate Level II Dysphagia DIet (finely chopped meats) with NECTAR-thickened liquids w/o overt S/S aspiration, MIN A, to improve swallow  3. Patient will complete OMEs x20 ea, MIN A, to improve R lingual/labial coordination and strength  4. Patient will attend to items on R side of tray with MIN A for demonstrated use of compensatory strategies for visiospatial skills  5. Patient will name 8 - 10 items/minute in a concrete category, MIN A, to improve word-finding and response time   6. Patient will demo clear speech strategies at the conversational level 90% of time or more, MIN A, to improve speech  7. Patient will recall 3/3 related items for imm, and post 3 minute filled delay, MIN A, to improve memory- MET      Patient will recall details of picture scenes after 5 minutes using learned memory strategies with MIN A  8. NEW: Patient will tolerate  trials of thin liquids w/o overt S/S aspiration, MIN A for use of safe swallow strategies, to improve swallow      Pain Rating Post-Intervention: 0/10    Assessment:  Forest Villanueva Jr. is a 71 y.o. male with a SLP diagnosis of Dysphagia, Dysarthria and Cognitive-Linguistic Impairment. He presented with the following progress:  Pt performance on the MOCA was as follows: 11/30 No change in overall score from previous assessment. Areas of improvement indicated in:  Naming task, attention task, sentence repetition. Pt performance went down in immediate recall attention task; however, pt was able to recall all numbers just not in correct order.  Pt was offered water during therapy. He refused at first because he was complaining of having to use the restroom excessively so far today. When pt asked to take just a few sips to determine safe swallow, he complied and was able to drink liquid w/o overt s/s aspiration.  Recommend upgrade to thin liquids.    Diet recommendations:   Solid Diet Level: Other (see comments) (dysphagia diet 2)    Liquid Diet Level: Thin  Feed only when awake/alert, No straws, Small bites/sips, Alternating bites/sips, 1 bite/sip at a time, Avoid talking while eating and Assistance with meals    Discharge recommendations: Discharge Facility/Level Of Care Needs: home     Goals:   SLP Goals        Problem: SLP Goal    Goal Priority Disciplines Outcome   SLP Goal     SLP Ongoing (interventions implemented as appropriate)   Description:  Short Term Goals modified 2/23/2017:  1. Patient will state compensatory strategies for oral clearance/safe swallow strategies and demonstrate use of strategies MIN A  2. Patient will tolerate Level II Dysphagia DIet (finely chopped meats) with NECTAR-thickened liquids w/o overt S/S aspiration, MIN A, to improve swallow  3. Patient will complete OMEs x20 ea, MIN A, to improve R lingual/labial coordination and strength  4. Patient will attend to items on R side of tray  with MIN A for demonstrated use of compensatory strategies for visiospatial skills  5. Patient will name 8 - 10 items/minute in a concrete category, MIN A, to improve word-finding and response time   6. Patient will demo clear speech strategies at the conversational level 90% of time or more, MIN A, to improve speech  7. Patient will recall 3/3 related items for imm, and post 3 minute filled delay, MIN A, to improve memory- MET      Patient will recall details of picture scenes after 5 minutes using learned memory strategies with MIN A  8. NEW: Patient will tolerate trials of thin liquids w/o overt S/S aspiration, MIN A for use of safe swallow strategies, to improve swallow   Long Term Goals:  1. Patient will tolerate least restrictive diet without S/S aspiration, and good oral clearance, Supervision  2. Educate Patient and family on POC and compensatory strategies for safe swallow   3. Patient will use functional memory strategies to recall events of the day and safely complete ADLs, with Supervision                     Plan:   Patient to be seen Therapy Frequency: 5 x/week   Plan of Care Expires: 03/08/17  Plan of Care reviewed with: patient  SLP Follow-up?: Yes  SLP - Next Visit Date: 03/07/17           Coni Ingram CCC-SLP 3/6/2017

## 2017-03-06 NOTE — PLAN OF CARE
Problem: Fall Risk (Adult)  Goal: Identify Related Risk Factors and Signs and Symptoms  Related risk factors and signs and symptoms are identified upon initiation of Human Response Clinical Practice Guideline (CPG)   Outcome: Ongoing (interventions implemented as appropriate)  Patient with risk factors. Patient aware to call prior to getting OOB/chair. Safety belt/bed alarms in use.

## 2017-03-06 NOTE — PLAN OF CARE
Problem: Stroke (Ischemic) (Adult)  Goal: Signs and Symptoms of Listed Potential Problems Will be Absent, Minimized or Managed (Stroke)  Signs and symptoms of listed potential problems will be absent, minimized or managed by discharge/transition of care (reference Stroke (Ischemic) (Adult) CPG).   Outcome: Ongoing (interventions implemented as appropriate)  Patient continues with s/s of CVA such as generalized weakness, incontinence, slow to respond to verbal questions and flat affect.

## 2017-03-06 NOTE — PLAN OF CARE
Problem: Patient Care Overview  Goal: Plan of Care Review  Outcome: Ongoing (interventions implemented as appropriate)  Alert and oriented, quiet slow verbal responses, denies pain, skin intact except to Pacemaker site steri strips intact SEBASTIAN, continent this shift of Bladder

## 2017-03-06 NOTE — PLAN OF CARE
Problem: Self-Care Deficit (Adult,Obstetrics,Pediatric)  Goal: Improved Ability to Perform BADL and IADL  Patient will demonstrate the desired outcomes by discharge/transition of care.   Outcome: Ongoing (interventions implemented as appropriate)  Patient continues to need assistance with ADL's due to weakness and mental slowness but showing progress with therapy

## 2017-03-06 NOTE — PLAN OF CARE
Problem: Pressure Ulcer Risk (Reed Scale) (Adult,Obstetrics,Pediatric)  Goal: Identify Related Risk Factors and Signs and Symptoms  Related risk factors and signs and symptoms are identified upon initiation of Human Response Clinical Practice Guideline (CPG)   Outcome: Ongoing (interventions implemented as appropriate)  Patient at risk for skin breakdown but able to move himself around in bed and ajust himself in bed. Skin intact

## 2017-03-07 PROCEDURE — 12800000 HC REHAB SEMI-PRIVATE ROOM

## 2017-03-07 PROCEDURE — 97542 WHEELCHAIR MNGMENT TRAINING: CPT

## 2017-03-07 PROCEDURE — 25000003 PHARM REV CODE 250: Performed by: PHYSICAL MEDICINE & REHABILITATION

## 2017-03-07 PROCEDURE — 97535 SELF CARE MNGMENT TRAINING: CPT

## 2017-03-07 PROCEDURE — 63600175 PHARM REV CODE 636 W HCPCS: Performed by: PHYSICAL MEDICINE & REHABILITATION

## 2017-03-07 PROCEDURE — 97802 MEDICAL NUTRITION INDIV IN: CPT

## 2017-03-07 PROCEDURE — 97110 THERAPEUTIC EXERCISES: CPT

## 2017-03-07 PROCEDURE — 97116 GAIT TRAINING THERAPY: CPT

## 2017-03-07 PROCEDURE — 63700000 PHARM REV CODE 250 ALT 637 W/O HCPCS: Performed by: PHYSICAL MEDICINE & REHABILITATION

## 2017-03-07 PROCEDURE — 92526 ORAL FUNCTION THERAPY: CPT

## 2017-03-07 PROCEDURE — 97530 THERAPEUTIC ACTIVITIES: CPT

## 2017-03-07 RX ADMIN — DORZOLAMIDE HYDROCHLORIDE AND TIMOLOL MALEATE 1 DROP: 20; 5 SOLUTION/ DROPS OPHTHALMIC at 09:03

## 2017-03-07 RX ADMIN — BRIMONIDINE TARTRATE 1 DROP: 1 SOLUTION/ DROPS OPHTHALMIC at 09:03

## 2017-03-07 RX ADMIN — METOPROLOL TARTRATE 25 MG: 25 TABLET ORAL at 09:03

## 2017-03-07 RX ADMIN — ENOXAPARIN SODIUM 40 MG: 100 INJECTION SUBCUTANEOUS at 12:03

## 2017-03-07 RX ADMIN — DOCUSATE SODIUM 100 MG: 100 CAPSULE, LIQUID FILLED ORAL at 09:03

## 2017-03-07 RX ADMIN — LATANOPROST 1 DROP: 50 SOLUTION OPHTHALMIC at 09:03

## 2017-03-07 RX ADMIN — ASPIRIN 81 MG: 81 TABLET, COATED ORAL at 09:03

## 2017-03-07 RX ADMIN — CLOPIDOGREL BISULFATE 75 MG: 75 TABLET ORAL at 09:03

## 2017-03-07 NOTE — PLAN OF CARE
Problem: SLP Goal  Goal: SLP Goal  Short Term Goals modified 3/7/2017:  1. Patient will state compensatory strategies for oral clearance/safe swallow strategies and demonstrate use of strategies MIN A  2. Upgraded 3/7/2017: Patient will tolerate Level II Dysphagia DIet (finely chopped meats) with thin liquids w/o overt S/S aspiration, MIN A, to improve swallow  3. Patient will complete OMEs x20 ea, MIN A, to improve R lingual/labial coordination and strength  4. Patient will attend to items on R side of tray with MIN A for demonstrated use of compensatory strategies for visiospatial skills  5. Patient will name 8 - 10 items/minute in a concrete category, MIN A, to improve word-finding and response time   6. Patient will demo clear speech strategies at the conversational level 90% of time or more, MIN A, to improve speech  7. Patient will recall 3/3 related items for imm, and post 3 minute filled delay, MIN A, to improve memory- MET  Patient will recall details of picture scenes after 5 minutes using learned memory strategies with MIN A  8. NEW: Patient will tolerate trials of thin liquids w/o overt S/S aspiration, MIN A for use of safe swallow strategies, to improve swallow   Long Term Goals:  1. Patient will tolerate least restrictive diet without S/S aspiration, and good oral clearance, Supervision  2. Educate Patient and family on POC and compensatory strategies for safe swallow   3. Patient will use functional memory strategies to recall events of the day and safely complete ADLs, with Supervision   Outcome: Ongoing (interventions implemented as appropriate)  Patient alert with increased spontaneous interaction this service day. SLP noted Patient's diet orders had not been modified per diet upgrade recommended day prior and reviewed with nurse Lentz. Nurse v/u patient on thin liquids and that he will modify diet order. STG#2 modified to reflect diet upgrade. Patient seen with self-fed, cup sips thin liquidsx 6  w/o overt S/S aspiration. He recalls safe swallow precautions following initial cue for set-up. Patient educated on ongoing safety precautions per anticipated d/c home including but not limited to assistance with medications/time/money management tasks and use of compensatory strategies for STM. Patient v/u recommendations. ST to continue to follow to improve safety awareness and carryover of compensatory strategies for swallowing and STM. MILE Lew., CCC-SLP

## 2017-03-07 NOTE — PT/OT/SLP PROGRESS
"Physical Therapy         Treatment        Forest Villanueva Jr.   MRN: [id number]     PT Received On: 17  Total Time (min): 75        Billable Minutes:  Gait [id number], Therapeutic Exercise 45 and Train/Wheelchair Management 15  Total Minutes: 75    Treatment Type: Treatment  PT/PTA: PTA     PTA Visit Number: 3       General Precautions: Standard, fall, pacemaker  Orthopedic Precautions: Orthopedic Precautions : N/A   Braces: Braces: N/A         Subjective:  Communicated with nurse Lentz prior to session.    Pt stating, "I'm ready." (in regards to therapy.) During session, pt began to state that he didn't want to do any further activity and wanted to go lay down in his bed. Pt required encouragement and coaxing throughout session to complete.   Pain Ratin/10              Pain Rating Post-Intervention: 0/10    Objective:  Patient found seated in w/c, with Patient found with:  (N/A)    Functional Mobility:  Bed Mobility: did not occur    Balance:   Static Stand: FAIR: Maintains without assist but unable to take challenges  Dynamic stand: FAIR: Needs CONTACT GUARD during gait    Transfer Training:  Sit to stand:Contact Guard Assistance with Rolling Walker .    Wheelchair Training:  Pt propelled Standard wheelchair x 200' feet on Level tile with  Bilateral lower extremity with Supervision or Set-up Assistance and increased time and encouragement requried to pedal .     Gait Trainin' with CGA/SBA using RW. Very slow pace noted and encouragement required to walk further distance.     Additional Treatment:  Seated BLE therex: HR/TR, ankle df with blue tb, LAQ and marching with 2# B, hip abd and hs curls with blue tb, hip add ball squeeze 3 x 10 reps each with a very slow pace     Standing BLE therex: marching, calf raises and toe raises x 20-25 reps each    SciFit BLE only: lv 2 x 12'    Activity Tolerance:  Patient tolerated treatment well and Patient limited by fatigue    Patient left up in chair with all " lines intact, chair alarm on and nurse Tor notified.      Assessment:  Forest Villanueva Jr. is a 71 y.o. male with a medical diagnosis of <principal problem not specified>. He presents with impaired functional mobility/independence 2' weakness, R hemiparesis, decreased balance/stability, impaired motor control/planning, decreased endurance/activity tolerance and decreased safety awareness. Pt continues to require increased time to complete all tasks 2' slow pace and delayed processing. Increased time and encouragement allotted for session, as pt wanted to quit session to go lay down in bed.        Rehab potential is fair.      Activity tolerance: Good/    GOALS:   Physical Therapy Goals        Problem: Physical Therapy Goal    Goal Priority Disciplines Outcome Goal Variances Interventions   Physical Therapy Goal     PT/OT, PT Ongoing (interventions implemented as appropriate)     Description:  Goals to be met by: 3/17/2017     Patient will increase functional independence with mobility by performin). Supine to sit with Modified [place]  2). Sit to supine with Modified [place]  3). Sit to stand transfer with Stand-by Assistance  4). Bed to chair transfer with Stand-by Assistance   5). Gait  x > 150 feet with Stand-by Assistance   6). Wheelchair propulsion x > 200 feet with Modified Crowley               PLAN:    Patient to be seen daily  to address the above listed problems via gait training, therapeutic activities, therapeutic exercises  Plan of Care expires: 17  Plan of Care reviewed with: patient         Arleen Bryant, PTA 3/7/2017

## 2017-03-07 NOTE — PT/OT/SLP PROGRESS
Occupational Therapy  Treatment    Forest Villanueva Jr.   MRN: [id number]   Admitting Diagnosis: CVA    OT Date of Treatment: 17   Total Time (min): 75 min      Billable Minutes:  Therapeutic Activity 50 and Therapeutic Exercise 25  Total Minutes: 75    General Precautions: Standard, fall, pacemaker  Orthopedic Precautions:    Braces: N/A    Do you have any cultural, spiritual, Moravian conflicts, given your current situation?: [place]    Subjective:  Communicated with nursing prior to session. Patient cooperative, but is slow moving, requires extended time to complete planned task.  Pain Ratin/10    Objective:    Patient was found in his room seated in a wheelchair watching TV. Patient was educated / instructed on extended wheelchair training extending from inside building to out front primarily using BLE and min assist at times from staff. Route taken challenged patient to propel his wheelchair up and down small rises / handicap access lanes, use push button / automatic doors, and passes on uneven surfaces with bumps. Patient also completed BUE exercisng with the biat melissa at table top for 4 sets of 5 mins each with no added weight       Patient left supine with call button in reach, bed alarm on and nursing notified    ASSESSMENT:  Forest Villanueva Jr. is a 71 y.o. male with a medical diagnosis of CVA and presents with decreased self care abilities.    GOALS:   Occupational Therapy Goals        Problem: Occupational Therapy Goal    Goal Priority Disciplines Outcome Interventions   Occupational Therapy Goal     OT, PT/OT Ongoing (interventions implemented as appropriate)    Description:  Goals to be met by: 3/14/17     Patient will increase functional independence with ADLs by performing:    Feeding with Set-up Assistance.  UE Dressing with Set-up Assistance.  LE Dressing with Set-up Assistance.  Grooming while seated with Set-up Assistance.  Toileting from toilet with Set-up Assistance for hygiene and  clothing management.   Bathing from  shower chair/bench with Set-up Assistance.  Shower transfer with Supervision.  Toilet transfer to toilet with Supervision.                Plan:  Cont POC.  Patient to be seen 6 x/week to address the above listed problems via self-care/home management, community/work re-entry, therapeutic activities, therapeutic exercises, therapeutic groups, neuromuscular re-education, wheelchair management/training  Plan of Care expires: 03/14/17  Plan of Care reviewed with: patient         SEBASTIAN Hill  03/07/2017

## 2017-03-07 NOTE — PLAN OF CARE
Problem: Nutrition, Imbalanced: Inadequate Oral Intake (Adult)  Goal: Identify Related Risk Factors and Signs and Symptoms  Related risk factors and signs and symptoms are identified upon initiation of Human Response Clinical Practice Guideline (CPG)   Recommendation/Intervention: 1) Continuer Cardiac diet with texture per SLP recs 2) Boost PLus with meals  Goals: 1) Meal intake 75% 2) Utilize Boost  Nutrition Goal Status: progressing towards goal  Communication of RD Recs: (discussed in rounds)

## 2017-03-07 NOTE — PLAN OF CARE
Problem: Patient Care Overview  Goal: Plan of Care Review  Outcome: Ongoing (interventions implemented as appropriate)  Awake, alert and oriented in no distress. Denies pain or discomfort. Understands and is compliant with goals. Free of falls this shift. Safety ongoing with alarms in use.

## 2017-03-07 NOTE — PLAN OF CARE
Problem: Patient Care Overview  Goal: Plan of Care Review  Outcome: Ongoing (interventions implemented as appropriate)  AAOx3 vss denies pain no acute distress noted tolerating therapy well

## 2017-03-07 NOTE — PROGRESS NOTES
Progress Note  Physical Medicine & Rehab    Admit Date: 2/14/2017   LOS: 21 days     SUBJECTIVE:     Follow-up For:  Daily round     Review of Systems:  denies cp, sob or abdomnial discomfort     OBJECTIVE:     Vital Signs (Most Recent)  Temp: 97.6 °F (36.4 °C) (03/07/17 0557)  Pulse: (!) 50 (03/07/17 0557)  Resp: 18 (03/07/17 0557)  BP: 99/67 (03/07/17 0557)  SpO2: 98 % (03/07/17 0557)    Vital Signs Range (Last 24H):  Temp:  [97.5 °F (36.4 °C)-97.9 °F (36.6 °C)]   Pulse:  [50]   Resp:  [18-20]   BP: ()/(54-70)   SpO2:  [97 %-99 %]     I & O (Last 24H):  Intake/Output Summary (Last 24 hours) at 03/07/17 0738  Last data filed at 03/06/17 1300   Gross per 24 hour   Intake              240 ml   Output              100 ml   Net              140 ml     Physical Exam:  Calm, cooperative in no distress  Lung: CTA B  Heart: rrr no m  Abd: soft, NTND + BS  Ext: no edema  Skin: intact     Laboratory:  Recent Results (from the past 72 hour(s))   CBC auto differential    Collection Time: 03/06/17  6:16 AM   Result Value Ref Range    WBC 5.60 3.90 - 12.70 K/uL    RBC 5.35 4.60 - 6.20 M/uL    Hemoglobin 14.7 14.0 - 18.0 g/dL    Hematocrit 45.6 40.0 - 54.0 %    MCV 85 82 - 98 fL    MCH 27.5 27.0 - 31.0 pg    MCHC 32.2 32.0 - 36.0 %    RDW 15.0 (H) 11.5 - 14.5 %    Platelets 215 150 - 350 K/uL    MPV 10.9 9.2 - 12.9 fL    Gran # 2.4 1.8 - 7.7 K/uL    Lymph # 2.6 1.0 - 4.8 K/uL    Mono # 0.5 0.3 - 1.0 K/uL    Eos # 0.1 0.0 - 0.5 K/uL    Baso # 0.00 0.00 - 0.20 K/uL    Gran% 43.0 38.0 - 73.0 %    Lymph% 46.4 18.0 - 48.0 %    Mono% 8.2 4.0 - 15.0 %    Eosinophil% 1.8 0.0 - 8.0 %    Basophil% 0.6 0.0 - 1.9 %    Platelet Estimate Appears normal     Aniso Slight     Large/Giant Platelets Present     Differential Method Automated    Comprehensive metabolic panel    Collection Time: 03/06/17  6:16 AM   Result Value Ref Range    Sodium 140 136 - 145 mmol/L    Potassium 4.5 3.5 - 5.1 mmol/L    Chloride 108 95 - 110 mmol/L    CO2 24 23  - 29 mmol/L    Glucose 96 70 - 110 mg/dL    BUN, Bld 25 (H) 8 - 23 mg/dL    Creatinine 1.2 0.5 - 1.4 mg/dL    Calcium 9.8 8.7 - 10.5 mg/dL    Total Protein 6.9 6.0 - 8.4 g/dL    Albumin 3.5 3.5 - 5.2 g/dL    Total Bilirubin 0.3 0.1 - 1.0 mg/dL    Alkaline Phosphatase 65 55 - 135 U/L    AST 23 10 - 40 U/L    ALT 33 10 - 44 U/L    Anion Gap 8 8 - 16 mmol/L    eGFR if African American >60 >60 mL/min/1.73 m^2    eGFR if non African American >60 >60 mL/min/1.73 m^2       Diagnostic Results:  no new imaging     ASSESSMENT/PLAN:     Active Hospital Problems    Diagnosis  POA    Mild malnutrition [E44.1]  Yes    CVA (cerebral vascular accident) [I63.9]  Yes      Resolved Hospital Problems    Diagnosis Date Resolved POA   No resolved problems to display.       CVA cont PT, OT, ST   Anticoagulation, cont asa & Plavix  DVT prophylaxis, cont sq Lovenox  HTN, vitals noted, cont current meds

## 2017-03-07 NOTE — PT/OT/SLP PROGRESS
"Speech Language Pathology Treatment          Forest Villanueva Jr.   MRN: [id number]     Diet recommendations: Solid Diet Level: Finely chopped meat  Liquid Diet Level: Thin Feed only when awake/alert, Small bites/sips, Alternating bites/sips, Check for pocketing/oral residue, Meds whole 1 at a time, Eliminate distractions and Avoid talking while eating    SLP Treatment Date: 03/07/17  Speech Start Time: 0840     Speech Stop Time: 0905     Speech Total (min): 25 min       TREATMENT BILLABLE MINUTES:  Speech Therapy Individual 15, Treatment Swallowing Dysfunction 10 and Total Time 25    General Precautions: Standard, fall, pacemaker          Subjective:  Patient says, "I was drinking pop ok. I can't eat crunchy things though."  He denies pain.  He presents calm and with increased eye contact today.     Objective:   Patient found in room in restroom with CNA. SLP initiated session following restrooming. Patient seen in room, positioned in wc next to bed and with call light in reach.   SLP student Clinician in room t/o session under Supervision from SLP VIOLETA Lew, CCC-SLP  1. Patient will state compensatory strategies for oral clearance/safe swallow strategies and demonstrate use of strategies MIN A. 3+ strategies with Supervision for set-up this service day.  2. Patient will tolerate Level II Dysphagia DIet (finely chopped meats) with NECTAR-thickened liquids w/o overt S/S aspiration, MIN A, to improve swallow. Today he accepted 6, self-fed cup sips apple juice w/o overt S/S aspiration.   6. Patient will demo clear speech strategies at the conversational level 90% of time or more, MIN A, to improve speech.  Ongoing mildly reduced intensity; however patient uses strategies 90% of time per minimal verbal/visual cues from SLP t/o conversational tasks this service day.     Assessment:  Forest Villanueva Jr. is a 71 y.o. male with a SLP diagnosis of Oral Dysphagia and Cognitive-Linguistic Impairment. He Patient " alert with increased spontaneous interaction this service day. SLP noted Patient's diet orders had not been modified per diet upgrade recommended day prior and reviewed with nurse Lentz. Nurse v/u patient on thin liquids and that he will modify diet order. Patient seen with self-fed, cup sips thin liquidsx 6 w/o overt S/S aspiration. He recalls afe swallow precautions following initial cue for set-up. Patient educated on ongoing safety precautions per anticipated d/c home including but not limited to assistance with medications/time/money management tasks and use of compensatory strategies for STM. Patient v/u recommendations. ST to continue to follow to improve safety awareness and carryover of compensatory strategies for swallowing and STM.    Discharge recommendations: Discharge Facility/Level Of Care Needs: home     Goals: modify #2 per diet upgrade and continue with POC.   SLP Goals        Problem: SLP Goal    Goal Priority Disciplines Outcome   SLP Goal     SLP Ongoing (interventions implemented as appropriate)   Description:  Short Term Goals modified 2/23/2017:  1. Patient will state compensatory strategies for oral clearance/safe swallow strategies and demonstrate use of strategies MIN A  2. Patient will tolerate Level II Dysphagia DIet (finely chopped meats) with NECTAR-thickened liquids w/o overt S/S aspiration, MIN A, to improve swallow  3. Patient will complete OMEs x20 ea, MIN A, to improve R lingual/labial coordination and strength  4. Patient will attend to items on R side of tray with MIN A for demonstrated use of compensatory strategies for visiospatial skills  5. Patient will name 8 - 10 items/minute in a concrete category, MIN A, to improve word-finding and response time   6. Patient will demo clear speech strategies at the conversational level 90% of time or more, MIN A, to improve speech  7. Patient will recall 3/3 related items for imm, and post 3 minute filled delay, MIN A, to improve memory-  MET      Patient will recall details of picture scenes after 5 minutes using learned memory strategies with MIN A  8. NEW: Patient will tolerate trials of thin liquids w/o overt S/S aspiration, MIN A for use of safe swallow strategies, to improve swallow   Long Term Goals:  1. Patient will tolerate least restrictive diet without S/S aspiration, and good oral clearance, Supervision  2. Educate Patient and family on POC and compensatory strategies for safe swallow   3. Patient will use functional memory strategies to recall events of the day and safely complete ADLs, with Supervision                     Plan: Modify STG#2 and Continue per POC.   Patient to be seen Therapy Frequency: 5 x/week   Plan of Care Expires:  (d/c date postponed in staffing this am)  Plan of Care reviewed with: patient  SLP Follow-up?: Yes  SLP - Next Visit Date: 03/10/17           Nicolle Pulliam CCC-SLP 3/7/2017

## 2017-03-07 NOTE — PROGRESS NOTES
Team conference attended and patient discussed.  Spoke with patient to discuss treatment plan and ELOS.  Spoke with patient's sister to provide an update and discuss discharge plans and schedule family training.  SW will follow and assist with discharge planning as needed.

## 2017-03-07 NOTE — DISCHARGE INSTRUCTIONS
Wheelchair and rolling walker for home use ordered from [facility] in [place].  Their phone # is [phone].    Referral made to [facility] in [place] [facility] for PT.  Their phone # is [phone]. Your first appt will be Monday, 3/13/17 @ 1:00.  Please arrive 45 minutes early and go to the out-patient registration desk.       Monitor patient heart rate, and do not give Metoprolol 25 if heart rate less than 60 and notify MD for further instructions.   Monitor patient blood pressure and do not give Lisinopril 2.5mg if less than 110/70 and notify MD for further instructions.

## 2017-03-07 NOTE — PROGRESS NOTES
[facility]  Adult Nutrition  Progress Note    SUMMARY     Recommendations    Recommendation/Intervention: 1) Continuer Cardiac diet with texture per SLP recs 2) Boost PLus with meals  Goals: 1) Meal intake 75% 2) Utilize Boost  Nutrition Goal Status: progressing towards goal  Communication of RD Recs:  (discussed in rounds)    1. CVA (cerebral vascular accident)      History reviewed. No pertinent past medical history.    Continuum of Care Plan    Referral to Outpatient Services:  (Cardiac with texture per SLP, Boost prn)    Reason for Assessment    Reason for Assessment: RD follow-up   Interdisciplinary Rounds: attended   General Information Comments: Pt eating fair, wt is stable.     Nutrition Prescription Ordered    Current Diet Order: Cardiac with texture per SLP rec  Nutrition Order Comments: dysphagia 2, thin liquids per SLP report in round   Oral Nutrition Supplement: Boost PLus, chocolate, with meals     Evaluation of Received Nutrients/Fluid Intake        Energy Calories Required: meeting needs   Protein Required: meeting needs   Other Fluid (mL): 600 (per I&O)    Comments: Meal intake varies from 25-75% plus pt report of using Boost Plus at least once daily.  Wt is stable.  Tolerance: tolerating     Nutrition Risk Screen     Nutrition Risk Screen: no indicators present    Nutrition/Diet History    Patient Reported Diet/Restrictions/Preferences: general   Factors Affecting Nutritional Intake: decreased appetite, difficulty/impaired swallowing     Labs/Tests/Procedures/Meds    Diagnostic Test/Procedure Review: reviewed, pertinent  Pertinent Labs Reviewed: reviewed, pertinent      Lab Results   Component Value Date    ALBUMIN 3.5 03/06/2017     No results found for: CRP  No results found for: CHOL  No results found for: HDL  No results found for: LDLCALC  No results found for: TRIG  No results found for: CHOLHDL  BMP  Lab Results   Component Value Date     03/06/2017    K 4.5  2017     2017    CO2 24 2017    BUN 25 (H) 2017    CREATININE 1.2 2017    CALCIUM 9.8 2017    ANIONGAP 8 2017    ESTGFRAFRICA >60 2017    EGFRNONAA >60 2017     Pertinent Medications Reviewed: reviewed, pertinent      Scheduled Meds:   aspirin  81 mg Oral Daily    brimonidine 0.1%  1 drop Both Eyes BID    clopidogrel  75 mg Oral Daily    docusate sodium  100 mg Oral Daily    dorzolamide-timolol 2-0.5%  1 drop Both Eyes BID    enoxaparin  40 mg Subcutaneous Daily    latanoprost  1 drop Both Eyes QHS    lisinopril  2.5 mg Oral Daily    metoprolol tartrate  25 mg Oral BID     Continuous Infusions:   PRN Meds:.bisacodyl, lactulose    Physical Findings    Overall Physical Appearance: underweight   Oral/Mouth Cavity: teeth absent  Skin:  (skin intact)    Anthropometrics    Height Method: Stated  Height (inches): 65.98 in  Weight Method: Bed Scale  Weight (kg): 67.4 kg (updated 3/7/17)  Ideal Body Weight (IBW), Male: 141.88 lb   % Ideal Body Weight, Male (lb): 104.73 lb   BMI (kg/m2): 23.99  Updated 3/7/17  Initial BMI was 22  BMI Grade:  (Underweight r/t BMI <23 in 66 yo)  Usual Body Weight (UBW), k.1 kg (17)  % Usual Body Weight: 103.53     Anthropometrics (Special Considerations)     Estimated/Assessed Needs      Weight Used For Calorie Calculations: 64.5 kg (142 lb 3.2 oz)   Height (cm): 167.6 cm  Energy Need Method: Kcal/kg  30 kcal/kg (kcal): 1935 and 35 kcal/kg (kcal): 2257.5 for wt gain  Weight Used For Protein Calculations: 64.5 kg (142 lb 3.2 oz)  1.0 gm Protein (gm): 64.63 and 1.2 gm Protein (gm): 77.56  Fluid Need Method: (1 ml/kcal or per MD rec)       Monitor and Evaluation    Food and Nutrient Intake: energy intake  Food and Nutrient Adminstration: diet order   Anthropometric Measurements: weight, weight change  Biochemical Data, Medical Tests and Procedures: lipid profile, electrolyte and renal panel  Nutrition-Focused Physical  Findings: overall appearance, skin    Nutrition Risk    Level of Risk:  (1 x weekly)    Nutrition Follow-Up    RD Follow-up?: Yes    Assessment and Plan    Mild malnutrition  Nutrition Problem:   Mild Malnutrition    % Intake of Estimated Energy Needs: 25 - 50 %  % Meal Intake: 50%  Malnutrition Reason: Other: BMI <23 in 67 yo (22.9 BMI)  Moderate Weight Loss Acute Illness/Injury: Other: na  Moderate Weight Loss Social/Environmental: Other: na  Moderate Weight Loss Chronic Illness: Other: na    Etiology/Related to:   Limited intake AEB meal intake 50%    Treatment Strategy:   1) Continue cardiac diet 2) Add Boost Plus with meals    Nutrition Diagnosis Status:   Progressing

## 2017-03-07 NOTE — PATIENT CARE CONFERENCE
Weekly Staffing Report      Date Admitted: 2/14/2017 :   Staffing Date: 3/7/2017     Patient Active Problem List   Diagnosis    CVA (cerebral vascular accident)    Mild malnutrition          Team Members Present:  Physician Team Member: Dr Garrido  Nursing Team Member: Moose Pickering RN  Case Management Team Member: Livier Coles CM/SW  PT Team Member: Christ Cowan PT  OT Team Member: Roseann Murphy OT  SLP Team Member: Nicolle MURCIA    Nursing  Skin: Intact  Bowel: Continent  Bladder:continent 20% incont   Last BM: 3-6-17  Diet: low fat low chol   Appetite:fair   Pain Level: 0/10    Physical Therapy  Supine to Sit:  standy by assistance  Sit to Stand: standy by assistance  Gait: 100-125 feet standy by assistance Rolling walker  Wheelchair Mobility: 200 feet standy by assistance  ROM: prom wfl   Stairs:6 four inch steps  contact guard  Strength: right 4- to 4/5, left 4 to 4+/5    Occupational Therapy  Feeding: supervision  Grooming:supervision  UED: supervision  LED: standy by assistance  Bathing:contact guard   Toileting:contact guard  Toilet Transfer:  contact guard  Tub Transfer:  contact guard  Strength: left 3- to 3+/5, right 4- to 4/5    Speech Therapy  Swallow: sup due to oral dysphasia   MMS: 11/30  Memory: moderate assist  Receptive Language: minimal assist to sup  Expressive Language: minimal assist  Problem solving: moderate assist to min a             Summary of Progress:  Good     Barriers to Progress/Discharge:     Tolerates 3 hours of therapy: Yes    Comments:     Estimated Length of Stay: 3-10-17

## 2017-03-08 PROCEDURE — 63700000 PHARM REV CODE 250 ALT 637 W/O HCPCS: Performed by: PHYSICAL MEDICINE & REHABILITATION

## 2017-03-08 PROCEDURE — 63600175 PHARM REV CODE 636 W HCPCS: Performed by: PHYSICAL MEDICINE & REHABILITATION

## 2017-03-08 PROCEDURE — 92507 TX SP LANG VOICE COMM INDIV: CPT

## 2017-03-08 PROCEDURE — 97530 THERAPEUTIC ACTIVITIES: CPT

## 2017-03-08 PROCEDURE — 25000003 PHARM REV CODE 250: Performed by: PHYSICAL MEDICINE & REHABILITATION

## 2017-03-08 PROCEDURE — 12800000 HC REHAB SEMI-PRIVATE ROOM

## 2017-03-08 PROCEDURE — 97532 *HC SP COG SKL DEV EA 15 MIN: CPT

## 2017-03-08 PROCEDURE — 97535 SELF CARE MNGMENT TRAINING: CPT

## 2017-03-08 RX ADMIN — ENOXAPARIN SODIUM 40 MG: 100 INJECTION SUBCUTANEOUS at 01:03

## 2017-03-08 RX ADMIN — DORZOLAMIDE HYDROCHLORIDE AND TIMOLOL MALEATE 1 DROP: 20; 5 SOLUTION/ DROPS OPHTHALMIC at 08:03

## 2017-03-08 RX ADMIN — METOPROLOL TARTRATE 25 MG: 25 TABLET ORAL at 09:03

## 2017-03-08 RX ADMIN — DORZOLAMIDE HYDROCHLORIDE AND TIMOLOL MALEATE 1 DROP: 20; 5 SOLUTION/ DROPS OPHTHALMIC at 09:03

## 2017-03-08 RX ADMIN — ASPIRIN 81 MG: 81 TABLET, COATED ORAL at 08:03

## 2017-03-08 RX ADMIN — BRIMONIDINE TARTRATE 1 DROP: 1 SOLUTION/ DROPS OPHTHALMIC at 08:03

## 2017-03-08 RX ADMIN — LATANOPROST 1 DROP: 50 SOLUTION OPHTHALMIC at 09:03

## 2017-03-08 RX ADMIN — DOCUSATE SODIUM 100 MG: 100 CAPSULE, LIQUID FILLED ORAL at 08:03

## 2017-03-08 RX ADMIN — BRIMONIDINE TARTRATE 1 DROP: 1 SOLUTION/ DROPS OPHTHALMIC at 09:03

## 2017-03-08 RX ADMIN — CLOPIDOGREL BISULFATE 75 MG: 75 TABLET ORAL at 08:03

## 2017-03-08 NOTE — PLAN OF CARE
Problem: Stroke (Ischemic) (Adult)  Goal: Signs and Symptoms of Listed Potential Problems Will be Absent, Minimized or Managed (Stroke)  Signs and symptoms of listed potential problems will be absent, minimized or managed by discharge/transition of care (reference Stroke (Ischemic) (Adult) CPG).   Outcome: Ongoing (interventions implemented as appropriate)  Observe for s/s of stroke symptoms

## 2017-03-08 NOTE — PLAN OF CARE
Problem: Patient Care Overview  Goal: Plan of Care Review  Outcome: Ongoing (interventions implemented as appropriate)  Slept well, pain controlled, no complaints of pain or discomfort. No overnight issues or incident thus far. Safety maintained.

## 2017-03-08 NOTE — PLAN OF CARE
Problem: Fall Risk (Adult)  Goal: Absence of Falls  Patient will demonstrate the desired outcomes by discharge/transition of care.   Outcome: Ongoing (interventions implemented as appropriate)  Monitor for injuries and falls. Maintain bed and chair alarms.

## 2017-03-08 NOTE — PLAN OF CARE
Problem: SLP Goal  Goal: SLP Goal  Short Term Goals modified 3/7/2017:  1. Patient will state compensatory strategies for oral clearance/safe swallow strategies and demonstrate use of strategies MIN A  2. Upgraded 3/7/2017: Patient will tolerate Level II Dysphagia DIet (finely chopped meats) with thin liquids w/o overt S/S aspiration, MIN A, to improve swallow  3. Patient will complete OMEs x20 ea, MIN A, to improve R lingual/labial coordination and strength  4. Patient will attend to items on R side of tray with MIN A for demonstrated use of compensatory strategies for visiospatial skills  5. Patient will name 8 - 10 items/minute in a concrete category, MIN A, to improve word-finding and response time   6. Patient will demo clear speech strategies at the conversational level 90% of time or more, MIN A, to improve speech  7. Patient will recall 3/3 related items for imm, and post 3 minute filled delay, MIN A, to improve memory- MET  Patient will recall details of picture scenes after 5 minutes using learned memory strategies with MIN A  8. NEW: Patient will tolerate trials of thin liquids w/o overt S/S aspiration, MIN A for use of safe swallow strategies, to improve swallow   Long Term Goals:  1. Patient will tolerate least restrictive diet without S/S aspiration, and good oral clearance, Supervision  2. Educate Patient and family on POC and compensatory strategies for safe swallow   3. Patient will use functional memory strategies to recall events of the day and safely complete ADLs, with Supervision   Outcome: Ongoing (interventions implemented as appropriate)  Patient completed OMEs x20 ea with MIN A and use of mirror as feedback.  Patient named an average of 7 items in a concrete category with MOD A and decreased topic maintenance.  Clear speech strategies reviewed with Patient, Patient utilized CSS at conversational level 50% of time with MIN A.

## 2017-03-08 NOTE — PT/OT/SLP PROGRESS
Occupational Therapy  Treatment    Forest Villanueva Jr.   MRN: [id number]   Admitting Diagnosis:     OT Date of Treatment: 17   Total Time (min): 25 min (pt refusing remainder of OT session)      Billable Minutes:  Self Care/Home Management 15 and Therapeutic Activity 10  Total Minutes: 25    General Precautions: Standard, fall, pacemaker  Orthopedic Precautions: N/A  Braces: N/A    Do you have any cultural, spiritual, Jewish conflicts, given your current situation?: Zoroastrian    Subjective:  Communicated with nurse prior to session.    Pain Ratin/10     Pain Rating Post-Intervention: 0/10    Objective:  Patient found with: bed alarm (R sidelying sleeping)    Functional Mobility:  Bed Mobility: Pt refusing to perform any EOB or OOB activity d/t sleepiness    Grooming:  Set-up and SBA to perform grooming tasks with HOB elevated; pt requiring extended time and verbal cues to complete tasks    Additional Treatment:  -pt refusing to participate in any more of OT session d/t sleepiness     Patient left HOB elevated with all lines intact, call button in reach, bed alarm on and [place] angel notified    ASSESSMENT:  Forest Villanueva Jr. is a 71 y.o. male with a medical diagnosis of CVA and presents with increased fatigue/sleepiness today; pt refusing to complete OT session secondary to fatigue. [place] notified and acknowledged.     Rehab potential is good    Activity tolerance: Good    Discharge recommendations: home     Equipment recommendations:  (TBD; likely BSC and transfer tub bench)     GOALS:   Occupational Therapy Goals        Problem: Occupational Therapy Goal    Goal Priority Disciplines Outcome Interventions   Occupational Therapy Goal     OT, PT/OT Ongoing (interventions implemented as appropriate)    Description:  Goals to be met by: 3/14/17     Patient will increase functional independence with ADLs by performing:    Feeding with Set-up Assistance.  UE Dressing with Set-up Assistance.  LE Dressing  with Set-up Assistance.  Grooming while seated with Set-up Assistance.  Toileting from toilet with Set-up Assistance for hygiene and clothing management.   Bathing from  shower chair/bench with Set-up Assistance.  Shower transfer with Supervision.  Toilet transfer to toilet with Supervision.                Plan:  Patient to be seen 6 x/week to address the above listed problems via self-care/home management, community/work re-entry, therapeutic activities, therapeutic exercises, therapeutic groups, neuromuscular re-education, wheelchair management/training  Plan of Care expires: 03/14/17  Plan of Care reviewed with: patient         Roseann GERRY Murphy, MORGAN  03/08/2017

## 2017-03-08 NOTE — PLAN OF CARE
Problem: Stroke (Ischemic) (Adult)  Intervention: Manage Hypertension/Promote Hemodynamic Stability  Blood pressure and heart rate monitored. Bradycardia observed, but remains hemodynamically stable.

## 2017-03-08 NOTE — PLAN OF CARE
Problem: Mobility, Physical Impaired (Adult)  Intervention: Modify Environment to Minimize Fall Risk  No incident of injury or falls observed so far.

## 2017-03-08 NOTE — PLAN OF CARE
Problem: Pressure Ulcer Risk (Reed Scale) (Adult,Obstetrics,Pediatric)  Intervention: Turn/Reposition Often  Patient turns self frequently and independently.No observed skin breakdowns

## 2017-03-08 NOTE — PLAN OF CARE
Problem: Fall Risk (Adult)  Intervention: Reduce Risk/Promote Restraint Free Environment  Alarms utilized as indicated. No incident of injuries or falls observed.

## 2017-03-08 NOTE — PROGRESS NOTES
Progress Note  Physical Medicine & Rehab    Admit Date: 2/14/2017   LOS: 22 days     SUBJECTIVE:     Follow-up For:  Daily round     Review of Systems:  denies cp, sob or abdominal discomfort     OBJECTIVE:     Vital Signs (Most Recent)  Temp: 97.3 °F (36.3 °C) (03/08/17 0500)  Pulse: (!) 50 (03/08/17 0500)  Resp: 18 (03/08/17 0500)  BP: (!) 108/56 (03/08/17 0500)  SpO2: 100 % (03/08/17 0500)    Vital Signs Range (Last 24H):  Temp:  [97.3 °F (36.3 °C)-98.4 °F (36.9 °C)]   Pulse:  [50]   Resp:  [18-20]   BP: (108-141)/(56-82)   SpO2:  [94 %-100 %]     I & O (Last 24H):  Intake/Output Summary (Last 24 hours) at 03/08/17 0747  Last data filed at 03/08/17 0500   Gross per 24 hour   Intake              840 ml   Output              400 ml   Net              440 ml     Physical Exam:  Calm, cooperative in no distress  Lung: CTA B  Heart: rrr no m  Abd: soft, NTND + BS  Ext: no edema  Skin: intact     Laboratory:  Recent Results (from the past 72 hour(s))   CBC auto differential    Collection Time: 03/06/17  6:16 AM   Result Value Ref Range    WBC 5.60 3.90 - 12.70 K/uL    RBC 5.35 4.60 - 6.20 M/uL    Hemoglobin 14.7 14.0 - 18.0 g/dL    Hematocrit 45.6 40.0 - 54.0 %    MCV 85 82 - 98 fL    MCH 27.5 27.0 - 31.0 pg    MCHC 32.2 32.0 - 36.0 %    RDW 15.0 (H) 11.5 - 14.5 %    Platelets 215 150 - 350 K/uL    MPV 10.9 9.2 - 12.9 fL    Gran # 2.4 1.8 - 7.7 K/uL    Lymph # 2.6 1.0 - 4.8 K/uL    Mono # 0.5 0.3 - 1.0 K/uL    Eos # 0.1 0.0 - 0.5 K/uL    Baso # 0.00 0.00 - 0.20 K/uL    Gran% 43.0 38.0 - 73.0 %    Lymph% 46.4 18.0 - 48.0 %    Mono% 8.2 4.0 - 15.0 %    Eosinophil% 1.8 0.0 - 8.0 %    Basophil% 0.6 0.0 - 1.9 %    Platelet Estimate Appears normal     Aniso Slight     Large/Giant Platelets Present     Differential Method Automated    Comprehensive metabolic panel    Collection Time: 03/06/17  6:16 AM   Result Value Ref Range    Sodium 140 136 - 145 mmol/L    Potassium 4.5 3.5 - 5.1 mmol/L    Chloride 108 95 - 110 mmol/L     CO2 24 23 - 29 mmol/L    Glucose 96 70 - 110 mg/dL    BUN, Bld 25 (H) 8 - 23 mg/dL    Creatinine 1.2 0.5 - 1.4 mg/dL    Calcium 9.8 8.7 - 10.5 mg/dL    Total Protein 6.9 6.0 - 8.4 g/dL    Albumin 3.5 3.5 - 5.2 g/dL    Total Bilirubin 0.3 0.1 - 1.0 mg/dL    Alkaline Phosphatase 65 55 - 135 U/L    AST 23 10 - 40 U/L    ALT 33 10 - 44 U/L    Anion Gap 8 8 - 16 mmol/L    eGFR if African American >60 >60 mL/min/1.73 m^2    eGFR if non African American >60 >60 mL/min/1.73 m^2       Diagnostic Results:  no new imaging    ASSESSMENT/PLAN:     Active Hospital Problems    Diagnosis  POA    Mild malnutrition [E44.1]  Yes    CVA (cerebral vascular accident) [I63.9]  Yes      Resolved Hospital Problems    Diagnosis Date Resolved POA   No resolved problems to display.       CVA cont PT, OT, ST   Anticoagulation, cont asa & Plavix  DVT prophylaxis, cont sq Lovenox  HTN, vitals noted, cont current meds

## 2017-03-08 NOTE — PLAN OF CARE
Problem: Pressure Ulcer Risk (Reed Scale) (Adult,Obstetrics,Pediatric)  Goal: Identify Related Risk Factors and Signs and Symptoms  Related risk factors and signs and symptoms are identified upon initiation of Human Response Clinical Practice Guideline (CPG)   Outcome: Ongoing (interventions implemented as appropriate)  Monitor for skin breakdowns, turn frequently

## 2017-03-08 NOTE — PT/OT/SLP PROGRESS
Speech Language Pathology Treatment          Forest Villanueva Jr.   MRN: [id number]     Diet recommendations: Solid Diet Level: Finely chopped meat  Liquid Diet Level: Thin     SLP Treatment Date: 03/08/17  Speech Start Time: 1340     Speech Stop Time: 1421     Speech Total (min): 41 min       TREATMENT BILLABLE MINUTES:  Speech Therapy Individual 41 and Total Time 41    General Precautions: Standard, fall, pacemaker          Subjective:  SLP informed that Patient refused OT and PT today, but Patient agreed to work with SLP.    Objective:   Patient found in wheelchair outside his room.  STG's addressed in treatment included:      3. Patient will complete OMEs x20 ea, MIN A, to improve R lingual/labial coordination and strength  4. Patient will attend to items on R side of tray with MIN A for demonstrated use of compensatory strategies for visiospatial skills  5. Patient will name 8 - 10 items/minute in a concrete category, MIN A, to improve word-finding and response time   6. Patient will demo clear speech strategies at the conversational level 90% of time or more, MIN A, to improve speech     Assessment:  Forest Villanueva Jr. is a 71 y.o. male with a SLP diagnosis of Dysphagia, Dysarthria and Cognitive-Linguistic Impairment. He presented with lethargy and flat affect.    Patient completed OMEs x20 ea with MIN A and use of mirror as feedback. Patient named an average of 7 items in a concrete category with MOD A and decreased topic maintenance. Clear speech strategies reviewed with Patient, Patient utilized CSS at conversational level 50% of time with MIN A.  Patient completed simple scan task (number cancellation) with 80% accuracy IND, 90% accuracy with MIN A to look for any items he missed.        Discharge recommendations: Discharge Facility/Level Of Care Needs: home     Goals:   SLP Goals        Problem: SLP Goal    Goal Priority Disciplines Outcome   SLP Goal     SLP Ongoing (interventions implemented as appropriate)    Description:  Short Term Goals modified 3/7/2017:  1. Patient will state compensatory strategies for oral clearance/safe swallow strategies and demonstrate use of strategies MIN A  2. Upgraded 3/7/2017: Patient will tolerate Level II Dysphagia DIet (finely chopped meats) with thin liquids w/o overt S/S aspiration, MIN A, to improve swallow  3. Patient will complete OMEs x20 ea, MIN A, to improve R lingual/labial coordination and strength  4. Patient will attend to items on R side of tray with MIN A for demonstrated use of compensatory strategies for visiospatial skills  5. Patient will name 8 - 10 items/minute in a concrete category, MIN A, to improve word-finding and response time   6. Patient will demo clear speech strategies at the conversational level 90% of time or more, MIN A, to improve speech  7. Patient will recall 3/3 related items for imm, and post 3 minute filled delay, MIN A, to improve memory- MET      Patient will recall details of picture scenes after 5 minutes using learned memory strategies with MIN A  8. NEW: Patient will tolerate trials of thin liquids w/o overt S/S aspiration, MIN A for use of safe swallow strategies, to improve swallow   Long Term Goals:  1. Patient will tolerate least restrictive diet without S/S aspiration, and good oral clearance, Supervision  2. Educate Patient and family on POC and compensatory strategies for safe swallow   3. Patient will use functional memory strategies to recall events of the day and safely complete ADLs, with Supervision                      Plan:   Patient to be seen Therapy Frequency: 5 x/week   Plan of Care Expires:  (d/c date postponed in staffing this am)  Plan of Care reviewed with: patient  SLP Follow-up?: Yes  SLP - Next Visit Date: 03/09/17           Bernarda Ordonez CCC-SLP 3/8/2017

## 2017-03-09 ENCOUNTER — TELEPHONE (OUTPATIENT)
Dept: CARDIOLOGY | Facility: CLINIC | Age: 72
End: 2017-03-09

## 2017-03-09 PROBLEM — Z95.1 HX OF CABG: Status: ACTIVE | Noted: 2017-03-09

## 2017-03-09 PROBLEM — Z95.810 PRESENCE OF IMPLANTABLE CARDIOVERTER-DEFIBRILLATOR (ICD): Status: ACTIVE | Noted: 2017-03-09

## 2017-03-09 PROBLEM — I25.10 CAD (CORONARY ARTERY DISEASE): Status: ACTIVE | Noted: 2017-03-09

## 2017-03-09 PROBLEM — I73.9 PVD (PERIPHERAL VASCULAR DISEASE): Status: ACTIVE | Noted: 2017-03-09

## 2017-03-09 PROBLEM — Z72.0 TOBACCO ABUSE: Status: ACTIVE | Noted: 2017-03-09

## 2017-03-09 PROBLEM — I10 HTN (HYPERTENSION): Status: ACTIVE | Noted: 2017-03-09

## 2017-03-09 PROBLEM — I25.5 ISCHEMIC CARDIOMYOPATHY: Status: ACTIVE | Noted: 2017-03-09

## 2017-03-09 PROBLEM — Z91.199 H/O NONCOMPLIANCE WITH MEDICAL TREATMENT, PRESENTING HAZARDS TO HEALTH: Status: ACTIVE | Noted: 2017-03-09

## 2017-03-09 LAB
TROPONIN I SERPL DL<=0.01 NG/ML-MCNC: 0.01 NG/ML
TROPONIN I SERPL DL<=0.01 NG/ML-MCNC: 0.02 NG/ML
TROPONIN I SERPL DL<=0.01 NG/ML-MCNC: 0.02 NG/ML

## 2017-03-09 PROCEDURE — 84484 ASSAY OF TROPONIN QUANT: CPT | Mod: 91

## 2017-03-09 PROCEDURE — 97532 *HC SP COG SKL DEV EA 15 MIN: CPT

## 2017-03-09 PROCEDURE — 84484 ASSAY OF TROPONIN QUANT: CPT

## 2017-03-09 PROCEDURE — 63700000 PHARM REV CODE 250 ALT 637 W/O HCPCS: Performed by: PHYSICAL MEDICINE & REHABILITATION

## 2017-03-09 PROCEDURE — 63600175 PHARM REV CODE 636 W HCPCS: Performed by: PHYSICAL MEDICINE & REHABILITATION

## 2017-03-09 PROCEDURE — 92526 ORAL FUNCTION THERAPY: CPT

## 2017-03-09 PROCEDURE — 97535 SELF CARE MNGMENT TRAINING: CPT

## 2017-03-09 PROCEDURE — 12800000 HC REHAB SEMI-PRIVATE ROOM

## 2017-03-09 PROCEDURE — 99223 1ST HOSP IP/OBS HIGH 75: CPT | Mod: ,,, | Performed by: INTERNAL MEDICINE

## 2017-03-09 PROCEDURE — 36415 COLL VENOUS BLD VENIPUNCTURE: CPT

## 2017-03-09 PROCEDURE — 25000003 PHARM REV CODE 250: Performed by: PHYSICAL MEDICINE & REHABILITATION

## 2017-03-09 RX ORDER — ATORVASTATIN CALCIUM 40 MG/1
80 TABLET, FILM COATED ORAL DAILY
Status: DISCONTINUED | OUTPATIENT
Start: 2017-03-10 | End: 2017-03-10 | Stop reason: HOSPADM

## 2017-03-09 RX ADMIN — BRIMONIDINE TARTRATE 1 DROP: 1 SOLUTION/ DROPS OPHTHALMIC at 09:03

## 2017-03-09 RX ADMIN — METOPROLOL TARTRATE 25 MG: 25 TABLET ORAL at 09:03

## 2017-03-09 RX ADMIN — CLOPIDOGREL BISULFATE 75 MG: 75 TABLET ORAL at 08:03

## 2017-03-09 RX ADMIN — DOCUSATE SODIUM 100 MG: 100 CAPSULE, LIQUID FILLED ORAL at 08:03

## 2017-03-09 RX ADMIN — BRIMONIDINE TARTRATE 1 DROP: 1 SOLUTION/ DROPS OPHTHALMIC at 08:03

## 2017-03-09 RX ADMIN — ASPIRIN 81 MG: 81 TABLET, COATED ORAL at 08:03

## 2017-03-09 RX ADMIN — DORZOLAMIDE HYDROCHLORIDE AND TIMOLOL MALEATE 1 DROP: 20; 5 SOLUTION/ DROPS OPHTHALMIC at 09:03

## 2017-03-09 RX ADMIN — ENOXAPARIN SODIUM 40 MG: 100 INJECTION SUBCUTANEOUS at 11:03

## 2017-03-09 RX ADMIN — METOPROLOL TARTRATE 25 MG: 25 TABLET ORAL at 08:03

## 2017-03-09 RX ADMIN — LATANOPROST 1 DROP: 50 SOLUTION OPHTHALMIC at 09:03

## 2017-03-09 RX ADMIN — DORZOLAMIDE HYDROCHLORIDE AND TIMOLOL MALEATE 1 DROP: 20; 5 SOLUTION/ DROPS OPHTHALMIC at 08:03

## 2017-03-09 RX ADMIN — LISINOPRIL 2.5 MG: 2.5 TABLET ORAL at 08:03

## 2017-03-09 NOTE — PT/OT/SLP PROGRESS
Speech Language Pathology Treatment          Forest Villanueva Jr.   MRN: [id number]     Diet recommendations: Solid Diet Level: Finely chopped meat  Liquid Diet Level: Thin Feed only when awake/alert and Remain upright 30 minutes post meal    SLP Treatment Date: 17  Speech Start Time: 1334     Speech Stop Time: 1404     Speech Total (min): 30 min       TREATMENT BILLABLE MINUTES:  Speech Therapy Individual 30    General Precautions: Standard, fall, pacemaker          Subjective:  Pt alert and cooperative during session. Improved participation this day.    Objective:   Patient found in bed, with  call button near.    Pain Ratin/10    1. Patient will state compensatory strategies for oral clearance/safe swallow strategies and demonstrate use of strategies MIN A  2. Upgraded 3/7/2017: Patient will tolerate Level II Dysphagia DIet (finely chopped meats) with thin liquids w/o overt S/S aspiration, MIN A, to improve swallow  3. Patient will complete OMEs x20 ea, MIN A, to improve R lingual/labial coordination and strength  4. Patient will attend to items on R side of tray with MIN A for demonstrated use of compensatory strategies for visiospatial skills  5. Patient will name 8 - 10 items/minute in a concrete category, MIN A, to improve word-finding and response time   6. Patient will demo clear speech strategies at the conversational level 90% of time or more, MIN A, to improve speech  7. Patient will recall 3/3 related items for imm, and post 3 minute filled delay, MIN A, to improve memory- MET      Patient will recall details of picture scenes after 5 minutes using learned memory strategies with MIN A  8. NEW: Patient will tolerate trials of thin liquids w/o overt S/S aspiration, MIN A for use of safe swallow strategies, to improve swallow            Pain Rating Post-Intervention: 0/10    Assessment:  Forest Villanueva Jr. is a 71 y.o. male with a SLP diagnosis of Dysphagia, Dysarthria and Cognitive-Linguistic  Impairment. He present with   Outcome: Ongoing (interventions implemented as appropriate)  3. Pt performed OMEx10 with SBA  5. Pt averaged 7 items with min A  6. Pt with decreased loudness  7. Pt recalled 2/3 related items on average.    Diet recommendations:   Solid Diet Level: Other (see comments) (dysphagia diet 2)    Liquid Diet Level: Thin  Feed only when awake/alert and Remain upright 30 minutes post meal    Discharge recommendations: Discharge Facility/Level Of Care Needs: home     Goals:   SLP Goals        Problem: SLP Goal    Goal Priority Disciplines Outcome   SLP Goal     SLP Ongoing (interventions implemented as appropriate)   Description:  Short Term Goals modified 3/7/2017:  1. Patient will state compensatory strategies for oral clearance/safe swallow strategies and demonstrate use of strategies MIN A  2. Upgraded 3/7/2017: Patient will tolerate Level II Dysphagia DIet (finely chopped meats) with thin liquids w/o overt S/S aspiration, MIN A, to improve swallow  3. Patient will complete OMEs x20 ea, MIN A, to improve R lingual/labial coordination and strength  4. Patient will attend to items on R side of tray with MIN A for demonstrated use of compensatory strategies for visiospatial skills  5. Patient will name 8 - 10 items/minute in a concrete category, MIN A, to improve word-finding and response time   6. Patient will demo clear speech strategies at the conversational level 90% of time or more, MIN A, to improve speech  7. Patient will recall 3/3 related items for imm, and post 3 minute filled delay, MIN A, to improve memory- MET      Patient will recall details of picture scenes after 5 minutes using learned memory strategies with MIN A  8. NEW: Patient will tolerate trials of thin liquids w/o overt S/S aspiration, MIN A for use of safe swallow strategies, to improve swallow   Long Term Goals:  1. Patient will tolerate least restrictive diet without S/S aspiration, and good oral clearance,  Supervision  2. Educate Patient and family on POC and compensatory strategies for safe swallow   3. Patient will use functional memory strategies to recall events of the day and safely complete ADLs, with Supervision                      Plan:   Patient to be seen Therapy Frequency: 5 x/week   Plan of Care Expires:  (d/c date postponed in staffing this am)  Plan of Care reviewed with: patient  SLP Follow-up?: Yes  SLP - Next Visit Date: 03/10/17           Coni Ingram CCC-SLP 3/9/2017

## 2017-03-09 NOTE — NURSING
Notified that OT therapist that pt c/o lower sternal tightness, SOB. Denies actual pain. Is A&O , skin dry. VS= 135/90, HR 56, Spo2 09% on R/A, RR 20. Pt in bed. Discomfort gone after about 10 minutes. DR Garrido called and order received for EKG and troponins.

## 2017-03-09 NOTE — PLAN OF CARE
Problem: Patient Care Overview  Goal: Plan of Care Review  Outcome: Ongoing (interventions implemented as appropriate)  AAOX3 vss denies pain no acute distress noted Tolerating therapy well safety protocol maintained

## 2017-03-09 NOTE — SUBJECTIVE & OBJECTIVE
History reviewed. No pertinent past medical history.    Past Surgical History:   Procedure Laterality Date    CARDIAC PACEMAKER PLACEMENT      CORONARY ARTERY BYPASS GRAFT      EYE SURGERY         Review of patient's allergies indicates:  No Known Allergies    No current facility-administered medications on file prior to encounter.      No current outpatient prescriptions on file prior to encounter.     Family History     None        Social History Main Topics    Smoking status: Current Every Day Smoker     Packs/day: 0.50     Years: 54.00     Types: Cigarettes     Start date: 2/14/1967    Smokeless tobacco: Not on file    Alcohol use Yes      Comment: social    Drug use: No    Sexual activity: Yes     Review of Systems   Constitution: Negative for chills, diaphoresis and weight gain.   HENT: Negative for congestion and sore throat.    Eyes: Negative for visual disturbance.   Cardiovascular: Positive for chest pain. Negative for dyspnea on exertion, irregular heartbeat, leg swelling, near-syncope, palpitations and syncope.   Respiratory: Negative for cough, hemoptysis and shortness of breath.    Hematologic/Lymphatic: Does not bruise/bleed easily.   Skin: Negative for color change and rash.   Musculoskeletal: Negative for arthritis, back pain and joint swelling.   Gastrointestinal: Negative for abdominal pain, constipation, diarrhea, melena, nausea and vomiting.   Genitourinary: Negative for dysuria and hematuria.   Neurological: Positive for focal weakness (no change). Negative for dizziness, light-headedness, numbness and paresthesias.   Psychiatric/Behavioral: Negative for depression.     Objective:     Vital Signs (Most Recent):  Temp: 98.4 °F (36.9 °C) (03/09/17 0500)  Pulse: (!) 56 (03/09/17 0855)  Resp: 18 (03/09/17 0500)  BP: (!) 137/92 (03/09/17 0855)  SpO2: 98 % (03/09/17 0500) Vital Signs (24h Range):  Temp:  [98 °F (36.7 °C)-98.4 °F (36.9 °C)] 98.4 °F (36.9 °C)  Pulse:  [50-64] 56  Resp:  [16-20]  18  SpO2:  [97 %-100 %] 98 %  BP: (107-137)/(60-92) 137/92     Weight: 67.4 kg (148 lb 8 oz)  Body mass index is 23.97 kg/(m^2).    SpO2: 98 %  O2 Device (Oxygen Therapy): room air      Intake/Output Summary (Last 24 hours) at 03/09/17 1510  Last data filed at 03/09/17 1300   Gross per 24 hour   Intake              840 ml   Output              450 ml   Net              390 ml       Lines/Drains/Airways          No matching active lines, drains, or airways          Physical Exam   Constitutional: He is oriented to person, place, and time. He appears well-developed and well-nourished. No distress.   HENT:   Head: Normocephalic and atraumatic.   Eyes: Conjunctivae and EOM are normal. Right eye exhibits no discharge. Left eye exhibits no discharge. No scleral icterus.   Neck: Normal range of motion. Normal carotid pulses and no JVD present. Carotid bruit is not present.   Cardiovascular: Regular rhythm and normal heart sounds.  Bradycardia present.    No murmur heard.  Pulses:       Radial pulses are 2+ on the right side, and 1+ on the left side.        Dorsalis pedis pulses are 1+ on the right side, and 1+ on the left side.   Distant heart tones   Pulmonary/Chest: Effort normal and breath sounds normal. He has no wheezes. He has no rales. He exhibits no tenderness.   Abdominal: Soft. Bowel sounds are normal. There is no tenderness. There is no guarding.   Musculoskeletal:   No evidence of LE edema or tenderness    Neurological: He is alert and oriented to person, place, and time.   Slight RUE and RLE weakness when compared to left   Skin: Skin is warm and dry. He is not diaphoretic. No cyanosis. Nails show no clubbing.   Psychiatric: He has a normal mood and affect. His behavior is normal.       Significant Labs:     Recent Labs  Lab 03/09/17  0908   TROPONINI 0.012       Significant Imaging:   Prior Carotid US done 02/09:  Less than 50% right and left ICA stenosis

## 2017-03-09 NOTE — PLAN OF CARE
Problem: Patient Care Overview  Goal: Plan of Care Review  Outcome: Ongoing (interventions implemented as appropriate)  Slept well, pain controlled. No events or problems overnight. Safety maintained.

## 2017-03-09 NOTE — TELEPHONE ENCOUNTER
3/9/17 1330  Spoke w/rehab dept earlier. Informed that NO consults should be left on the message line, they are to contact doctors directly on their cell #s. Verbalized understanding. [facility] cardiologists ([facility]an [facility] Noman) numbers given, she added them to the book for future reference. JN

## 2017-03-09 NOTE — TELEPHONE ENCOUNTER
----- Message from Laura Zayas sent at 3/9/2017  1:01 PM CST -----  Contact: [facility] [place] - CONSULT  Cuauhtemoc, the nurse, at [facility] [place] called for a Consult.    Consult:    Requested Dr:  Dr Elen Garrido  Room number:   241  Phone:  [phone]  Diagnosis:  Admitted for CVA    Thank you

## 2017-03-09 NOTE — PLAN OF CARE
Problem: SLP Goal  Goal: SLP Goal  Short Term Goals modified 3/7/2017:  1. Patient will state compensatory strategies for oral clearance/safe swallow strategies and demonstrate use of strategies MIN A  2. Upgraded 3/7/2017: Patient will tolerate Level II Dysphagia DIet (finely chopped meats) with thin liquids w/o overt S/S aspiration, MIN A, to improve swallow  3. Patient will complete OMEs x20 ea, MIN A, to improve R lingual/labial coordination and strength  4. Patient will attend to items on R side of tray with MIN A for demonstrated use of compensatory strategies for visiospatial skills  5. Patient will name 8 - 10 items/minute in a concrete category, MIN A, to improve word-finding and response time   6. Patient will demo clear speech strategies at the conversational level 90% of time or more, MIN A, to improve speech  7. Patient will recall 3/3 related items for imm, and post 3 minute filled delay, MIN A, to improve memory- MET  Patient will recall details of picture scenes after 5 minutes using learned memory strategies with MIN A  8. NEW: Patient will tolerate trials of thin liquids w/o overt S/S aspiration, MIN A for use of safe swallow strategies, to improve swallow   Long Term Goals:  1. Patient will tolerate least restrictive diet without S/S aspiration, and good oral clearance, Supervision  2. Educate Patient and family on POC and compensatory strategies for safe swallow   3. Patient will use functional memory strategies to recall events of the day and safely complete ADLs, with Supervision   Outcome: Ongoing (interventions implemented as appropriate)  3. Pt performed OMEx10 with SBA  5. Pt averaged 7 items with min A  6. Pt with decreased loudness  7. Pt recalled 2/3 related items on average

## 2017-03-09 NOTE — PROGRESS NOTES
Progress Note  Physical Medicine & Rehab    Admit Date: 2/14/2017   LOS: 23 days     SUBJECTIVE:     Follow-up For:  Daily round     Review of Systems:  denies cp, sob or abdominal discomfort     OBJECTIVE:     Vital Signs (Most Recent)  Temp: 98.4 °F (36.9 °C) (03/09/17 0500)  Pulse: (!) 50 (03/09/17 0500)  Resp: 18 (03/09/17 0500)  BP: 110/83 (03/09/17 0500)  SpO2: 98 % (03/09/17 0500)    Vital Signs Range (Last 24H):  Temp:  [98 °F (36.7 °C)-98.4 °F (36.9 °C)]   Pulse:  [50-64]   Resp:  [16-20]   BP: (107-115)/(60-83)   SpO2:  [97 %-100 %]     I & O (Last 24H):  Intake/Output Summary (Last 24 hours) at 03/09/17 0740  Last data filed at 03/09/17 0500   Gross per 24 hour   Intake              960 ml   Output              450 ml   Net              510 ml     Physical Exam:  Calm, cooperative in no distress  Lung: CTA B  Heart: rrr no m  Abd: soft, NTND + BS  Ext: no edema  Skin: intact     Laboratory:  No results found for this or any previous visit (from the past 72 hour(s)).    Diagnostic Results:  no new imaging     ASSESSMENT/PLAN:     Active Hospital Problems    Diagnosis  POA    Mild malnutrition [E44.1]  Yes    CVA (cerebral vascular accident) [I63.9]  Yes      Resolved Hospital Problems    Diagnosis Date Resolved POA   No resolved problems to display.       CVA cont PT, OT, ST   Anticoagulation, cont asa & Plavix  DVT prophylaxis, cont sq Lovenox  HTN, vitals noted, cont current meds

## 2017-03-09 NOTE — PLAN OF CARE
Problem: Stroke (Ischemic) (Adult)  Intervention: Manage Hypertension/Promote Hemodynamic Stability  Blood pressure and heart rate monitored. Hemodynamically stable.

## 2017-03-09 NOTE — PT/OT/SLP PROGRESS
"Physical Therapy      Forest Villanueva Jr.  MRN: [id number]    Patient not seen today secondary to Patient fatigue, Patient unwilling to participate. Pt found sleeping in bed and deferred PT despite encouragement. Pt stating, "I have no energy."  Will follow-up 3-9-2017.    Arleen Bryant, PTA    "

## 2017-03-09 NOTE — CONSULTS
[facility]  Cardiology  Consult Note    Patient Name: Forest Villanueva Jr.  MRN: [id number]  Admission Date: 2/14/2017  Hospital Length of Stay: 23 days  Code Status: No Order   Attending Provider: Aleah Garrido MD   Consulting Provider: Jaja Mahoney NP  Primary Care Physician: Primary Doctor No  Principal Problem:<principal problem not specified>      Inpatient consult to Cardiology  Consult performed by: JAJA MAHONEY  Consult ordered by: ALEAH GARRIDO  Reason for consult: chest pain      This is a new patient to me, Jaja Mahoney NP, and to my collaborating physician, Dr. Heredia.   Subjective:     Chief Complaint:  Chest pain    HPI:     PCP: Dr. BETTY Perez in [place], [place]  Cardiology:  Patient unsure of his cardiologist's name.  States he has seen a cardiologist in [place], [place].  Patient thinks he saw Dr. Dejesus.  Wife stating that patient last saw a cardiologist in [place] a year ago.     Patient lives with his wife, Monica  Current smoker, 40 pack year history  Denies ETOH consumption  Patient is prior CNA    Patient is a very difficult historian when discussing aspects of his healthcare.  Most of patient's pmh obtained from medical records.      Cardiology consultation for CP in a 72 y/o AAM with a h/o CAD, prior CABG (2010), cardiac stents, PVD with prior intervention to lower extremity, ischemic cardiomyopathy, LVEF 15 - 20% (per last echo done 02/09/2017 at outside facility), ICD (generator change last month 02/2017- single chamber, Biotronik), thromboembolic CVA in 2014 at which point he was noted to have thrombus in IJ, HTN, noncompliance with BP medication and OAC (coumadin), and glaucoma.  Admitted for CVA to [facility] in [place], [place] last month on 02/08/2017.  Transferred to [facility] on 02/14/2017.      Today, patient with a c/o a 10 minute episode of left-sided, non-radiating chest pain this morning around 8:30am  when he got out of the shower. Denies associated symptoms.  No further episodes of chest pain.  Denies similar episodes of CP in the past; however, patient's wife mentioned to nursing staff that he complains of chest pain frequently when he gets excited or upset.   No SOB, abdominal pain, diaphoresis, n/v, back pain, palpitations, dizziness, syncope.    No elevation in troponin x 1.  EKG obtained today showed paced rhythm rate of 53 bpm and one sinus beat with IVCD.  Prior EKG from [facility] done 02/09/2017 showing ventricular pacing. Per medical record review, patient had lipid panel done 02/09/2017 showing LDL of 189 and a non-HDL of 210, not on statin at this facility.  Was previously on Crestor 40mg daily at [facility].  Current medications include daily low dose aspirin, plavix, ACEI (lisinopril 2.5 mg daily), and BB (metoprolol tartrate 25 mg twice daily).  Echo done on 02/09/2017 at [facility] showing an LVEF of 15 -20%, diastolic dysfunction, and mild mitral and pulmonic regurgitation.  No recent stress test reports available for review.          Past Surgical History:   Procedure Laterality Date    CARDIAC PACEMAKER PLACEMENT      CORONARY ARTERY BYPASS GRAFT      EYE SURGERY         Review of patient's allergies indicates:  No Known Allergies    No current facility-administered medications on file prior to encounter.      No current outpatient prescriptions on file prior to encounter.     Family History     None        Social History Main Topics    Smoking status: Current Every Day Smoker     Packs/day: 0.50     Years: 54.00     Types: Cigarettes     Start date: 2/14/1967    Smokeless tobacco: Not on file    Alcohol use Yes      Comment: social    Drug use: No    Sexual activity: Yes     Review of Systems   Constitution: Negative for chills, diaphoresis and weight gain.   HENT: Negative for congestion and sore throat.    Eyes: Negative for visual disturbance.   Cardiovascular: Positive for chest pain. Negative for  dyspnea on exertion, irregular heartbeat, leg swelling, near-syncope, palpitations and syncope.   Respiratory: Negative for cough, hemoptysis and shortness of breath.    Hematologic/Lymphatic: Does not bruise/bleed easily.   Skin: Negative for color change and rash.   Musculoskeletal: Negative for arthritis, back pain and joint swelling.   Gastrointestinal: Negative for abdominal pain, constipation, diarrhea, melena, nausea and vomiting.   Genitourinary: Negative for dysuria and hematuria.   Neurological: Positive for focal weakness (no change). Negative for dizziness, light-headedness, numbness and paresthesias.   Psychiatric/Behavioral: Negative for depression.     Objective:     Vital Signs (Most Recent):  Temp: 98.4 °F (36.9 °C) (03/09/17 0500)  Pulse: (!) 56 (03/09/17 0855)  Resp: 18 (03/09/17 0500)  BP: (!) 137/92 (03/09/17 0855)  SpO2: 98 % (03/09/17 0500) Vital Signs (24h Range):  Temp:  [98 °F (36.7 °C)-98.4 °F (36.9 °C)] 98.4 °F (36.9 °C)  Pulse:  [50-64] 56  Resp:  [16-20] 18  SpO2:  [97 %-100 %] 98 %  BP: (107-137)/(60-92) 137/92     Weight: 67.4 kg (148 lb 8 oz)  Body mass index is 23.97 kg/(m^2).    SpO2: 98 %  O2 Device (Oxygen Therapy): room air      Intake/Output Summary (Last 24 hours) at 03/09/17 1510  Last data filed at 03/09/17 1300   Gross per 24 hour   Intake              840 ml   Output              450 ml   Net              390 ml       Lines/Drains/Airways          No matching active lines, drains, or airways          Physical Exam   Constitutional: He is oriented to person, place, and time. He appears well-developed and well-nourished. No distress.   HENT:   Head: Normocephalic and atraumatic.   Eyes: Conjunctivae and EOM are normal. Right eye exhibits no discharge. Left eye exhibits no discharge. No scleral icterus.   Neck: Normal range of motion. Normal carotid pulses and no JVD present. Carotid bruit is not present.   Cardiovascular: Regular rhythm and normal heart sounds.  Bradycardia  present.    No murmur heard.  Pulses:       Radial pulses are 2+ on the right side, and 1+ on the left side.        Dorsalis pedis pulses are 1+ on the right side, and 1+ on the left side.   Distant heart tones   Pulmonary/Chest: Effort normal and breath sounds normal. He has no wheezes. He has no rales.   Chest wall: Left upper chest wall incision over device site well approximated, steri strips in place that are c/d/i.    Abdominal: Soft. Bowel sounds are normal. There is no tenderness. There is no guarding.   Musculoskeletal:   No evidence of LE edema or tenderness    Neurological: He is alert and oriented to person, place, and time.   Slight RUE and RLE weakness when compared to left   Skin: Skin is warm and dry. He is not diaphoretic. No cyanosis. Nails show no clubbing.   Psychiatric: He has a normal mood and affect. His behavior is normal.       Significant Labs:     Recent Labs  Lab 03/09/17  0908   TROPONINI 0.012       Significant Imaging:   Prior Carotid US done 02/09:  Less than 50% right and left ICA stenosis         Assessment and Plan:     Active Hospital Problems    Diagnosis    Ischemic cardiomyopathy     LVEF 15-20% per echo done on 02/09/2017 at [facility] in [place], [place]      Presence of implantable cardioverter-defibrillator (ICD)     Generator change on 02/13/2017 at [facility] in [place], [place] - Single Chamber, Biotronik      CAD (coronary artery disease)    Hx of CABG    HTN (hypertension)    H/O noncompliance with medical treatment, presenting hazards to health    Tobacco abuse    PVD (peripheral vascular disease)    Mild malnutrition    CVA (cerebral vascular accident)         - Begin high intensity statin (order placed for Lipitor 80mg daily)  - Continue ASA, Plavix, ACEI, and BB  - Trend troponin   - EKG and CXR in AM       See Dr. Heredia's MD attestation above for additional information and recommendations.          Jaja Mahoney NP  Cardiology   [facility]

## 2017-03-09 NOTE — PT/OT/SLP PROGRESS
Occupational Therapy  Treatment    Forest Villanueva Jr.   MRN: [id number]   Admitting Diagnosis: CVA    OT Date of Treatment: 03/09/17   Total Time (min): 70 min      Billable Minutes:  Self Care/Home Management 70  Total Minutes: 70    General Precautions: Standard, fall, pacemaker  Orthopedic Precautions: N/A  Braces: N/A    Do you have any cultural, spiritual, Scientologist conflicts, given your current situation?: Tenriism    Subjective:  Communicated with nurse prior to session.     Pain Rating:  (towards end of OT session, pt c/o chest pain indicating sternum; upon inquiry of OTR, pt reports tightness and shortness of breath, but no pressure in area)           Pain Addressed: Other (see comments) (Dr. Garrido immediately notified and examining pt - ordering EKG and blood work; RNCuauhtemoc also notified immediately- vitals taken. Pt transferred to bed for EKG)       Objective:  Patient found with:  (w/c alarm and up in pt dining area)    Functional Mobility:  Bed Mobility:   Sit to supine: Standby Assistance    Transfer Training:  Sit to stand:Stand-by Assistance with Rolling Walker instruction to maintain pacemaker precautions and limit use of L UE  Bed <> Chair:  Stand Pivot with Contact Guard Assistance with No Assistive Device instruction to adhere to pacemaker precautions and limit use of L UE  Toilet Transfer:  Pt Stand Pivot with Contact Guard Assistance with grab bar and drop arm commode positioned over toilet instruction to adhere to pacemaker precautions  Tub bench transfer Stand Pivot with Contact Guard Assistance with rolling walker and step by step instruction to adhere to pacemaker precautions and limit use of L UE    Feeding:  Set-up/Supervision in pt dining room     Grooming:  Supervision/Mod (I) sitting sink side    Bathing:  Patient performed bathing with Minimal Assistance with grab bar, Handheld shower head and tub bench at Shower.    UE Dressing:  Patient performed UE Dressing with Supervision or  Set-up Assistance with no AE at Wheelchair.    LE Dressing:  Patient don/doffed socks with Supervision or Set-up Assistance, Patient don/doffed shoes with Supervision or Set-up Assistance, Patient don/doffed adult brief with Stand-by Assistance and Patient don/doffed pants with Stand-by Assistance    Toilet Training:  Pt performed toileting with Minimal Assistance with grab bar and drop arm commode positioned over toilet    Additional Treatment:  -Reinforced education and instruction regarding adherence to pacemaker precautions to perform self care tasks and functional transfers safely, reviewing sequence of transfer techniques, safety awareness, etc    Patient left HOB elevated with call button in reach, bed alarm on and nurseCuauhtemoc present    ASSESSMENT:  Forest Villanueva Jr. is a 71 y.o. male with a medical diagnosis of CVA and presents with reports of chest pain, tightness, and SOB. Dr. Garrido immediately notified and examining patient - ordering EKG and blood work. RNCuauhtemoc notified and vitals assessed. Patient returned to bed for testing. Prior to reports, pt performing self care tasks with SBA / Min (A) with DME/AE as needed and verbal cues for safety and adherence to pacemaker precautions. Patient scheduled for discharge tomorrow and family training with patient's sister scheduled prior to discharge.     Rehab potential is good    Activity tolerance: Good    Discharge recommendations: home     Equipment recommendations:  (TBD; likely BSC and transfer tub bench)     GOALS:   Occupational Therapy Goals        Problem: Occupational Therapy Goal    Goal Priority Disciplines Outcome Interventions   Occupational Therapy Goal     OT, PT/OT Ongoing (interventions implemented as appropriate)    Description:  Goals to be met by: 3/14/17     Patient will increase functional independence with ADLs by performing:    Feeding with Set-up Assistance.  UE Dressing with Set-up Assistance.  LE Dressing with Set-up  Assistance.  Grooming while seated with Set-up Assistance.  Toileting from toilet with Set-up Assistance for hygiene and clothing management.   Bathing from  shower chair/bench with Set-up Assistance.  Shower transfer with Supervision.  Toilet transfer to toilet with Supervision.                Plan:  Patient to be seen 6 x/week to address the above listed problems via self-care/home management, community/work re-entry, therapeutic activities, therapeutic exercises, therapeutic groups, neuromuscular re-education, wheelchair management/training  Plan of Care expires: 03/14/17  Plan of Care reviewed with: patient         Roseann GERRY Murphy, LOTR  03/09/2017

## 2017-03-10 VITALS
HEART RATE: 52 BPM | HEIGHT: 66 IN | OXYGEN SATURATION: 96 % | SYSTOLIC BLOOD PRESSURE: 92 MMHG | DIASTOLIC BLOOD PRESSURE: 60 MMHG | WEIGHT: 148.5 LBS | TEMPERATURE: 97 F | RESPIRATION RATE: 18 BRPM | BODY MASS INDEX: 23.87 KG/M2

## 2017-03-10 LAB — TROPONIN I SERPL DL<=0.01 NG/ML-MCNC: 0.02 NG/ML

## 2017-03-10 PROCEDURE — 63700000 PHARM REV CODE 250 ALT 637 W/O HCPCS: Performed by: PHYSICAL MEDICINE & REHABILITATION

## 2017-03-10 PROCEDURE — 36415 COLL VENOUS BLD VENIPUNCTURE: CPT

## 2017-03-10 PROCEDURE — 63600175 PHARM REV CODE 636 W HCPCS: Performed by: PHYSICAL MEDICINE & REHABILITATION

## 2017-03-10 PROCEDURE — 25000003 PHARM REV CODE 250: Performed by: NURSE PRACTITIONER

## 2017-03-10 PROCEDURE — 97530 THERAPEUTIC ACTIVITIES: CPT

## 2017-03-10 PROCEDURE — 99233 SBSQ HOSP IP/OBS HIGH 50: CPT | Mod: ,,, | Performed by: NURSE PRACTITIONER

## 2017-03-10 PROCEDURE — 84484 ASSAY OF TROPONIN QUANT: CPT

## 2017-03-10 PROCEDURE — 25000003 PHARM REV CODE 250: Performed by: PHYSICAL MEDICINE & REHABILITATION

## 2017-03-10 RX ORDER — DOCUSATE SODIUM 100 MG/1
100 CAPSULE, LIQUID FILLED ORAL DAILY
Qty: 30 CAPSULE | Refills: 1 | Status: SHIPPED | OUTPATIENT
Start: 2017-03-10

## 2017-03-10 RX ORDER — DORZOLAMIDE HYDROCHLORIDE AND TIMOLOL MALEATE 20; 5 MG/ML; MG/ML
1 SOLUTION/ DROPS OPHTHALMIC 2 TIMES DAILY
Qty: 10 ML | Refills: 1 | Status: SHIPPED | OUTPATIENT
Start: 2017-03-10 | End: 2018-05-22 | Stop reason: SDUPTHER

## 2017-03-10 RX ORDER — LATANOPROST 50 UG/ML
1 SOLUTION/ DROPS OPHTHALMIC NIGHTLY
Qty: 2.5 ML | Refills: 1 | Status: SHIPPED | OUTPATIENT
Start: 2017-03-10 | End: 2018-05-22 | Stop reason: SDUPTHER

## 2017-03-10 RX ORDER — ASPIRIN 81 MG/1
81 TABLET ORAL DAILY
Qty: 30 TABLET | Refills: 1 | Status: SHIPPED | OUTPATIENT
Start: 2017-03-10 | End: 2018-03-10

## 2017-03-10 RX ORDER — CLOPIDOGREL BISULFATE 75 MG/1
75 TABLET ORAL DAILY
Qty: 30 TABLET | Refills: 1 | Status: SHIPPED | OUTPATIENT
Start: 2017-03-10 | End: 2018-03-10

## 2017-03-10 RX ORDER — LISINOPRIL 2.5 MG/1
2.5 TABLET ORAL DAILY
Qty: 30 TABLET | Refills: 1 | Status: SHIPPED | OUTPATIENT
Start: 2017-03-10 | End: 2018-03-10

## 2017-03-10 RX ORDER — METOPROLOL TARTRATE 25 MG/1
25 TABLET, FILM COATED ORAL 2 TIMES DAILY
Qty: 30 TABLET | Refills: 1 | Status: SHIPPED | OUTPATIENT
Start: 2017-03-10 | End: 2018-03-10

## 2017-03-10 RX ORDER — ATORVASTATIN CALCIUM 80 MG/1
80 TABLET, FILM COATED ORAL DAILY
Qty: 30 TABLET | Refills: 1 | Status: SHIPPED | OUTPATIENT
Start: 2017-03-10 | End: 2018-03-10

## 2017-03-10 RX ORDER — BRIMONIDINE TARTRATE 1 MG/ML
1 SOLUTION/ DROPS OPHTHALMIC 2 TIMES DAILY
Qty: 10 ML | Refills: 1 | Status: SHIPPED | OUTPATIENT
Start: 2017-03-10 | End: 2018-05-22 | Stop reason: SDUPTHER

## 2017-03-10 RX ADMIN — ENOXAPARIN SODIUM 40 MG: 100 INJECTION SUBCUTANEOUS at 11:03

## 2017-03-10 RX ADMIN — DORZOLAMIDE HYDROCHLORIDE AND TIMOLOL MALEATE 1 DROP: 20; 5 SOLUTION/ DROPS OPHTHALMIC at 09:03

## 2017-03-10 RX ADMIN — DOCUSATE SODIUM 100 MG: 100 CAPSULE, LIQUID FILLED ORAL at 09:03

## 2017-03-10 RX ADMIN — BRIMONIDINE TARTRATE 1 DROP: 1 SOLUTION/ DROPS OPHTHALMIC at 09:03

## 2017-03-10 RX ADMIN — CLOPIDOGREL BISULFATE 75 MG: 75 TABLET ORAL at 09:03

## 2017-03-10 RX ADMIN — ASPIRIN 81 MG: 81 TABLET, COATED ORAL at 09:03

## 2017-03-10 RX ADMIN — ATORVASTATIN CALCIUM 80 MG: 40 TABLET, FILM COATED ORAL at 09:03

## 2017-03-10 NOTE — NURSING
Discharge instructions given to family and discharge paperwork/folder given to family. Family v/u.

## 2017-03-10 NOTE — PT/OT/SLP PROGRESS
Physical Therapy      Forest Villanueva Jr.  MRN: [id number]    Patient not seen today secondary to Pain, Patient unwilling to participate, Patient fatigue. Pt c/o chest pains (nurse Cuauhtemoc notified/aware). Pt unwilling to participate per 2 attempts. Will follow-up 3-.    Arleen Byrant, PTA

## 2017-03-10 NOTE — PT/OT/SLP DISCHARGE
Occupational Therapy Discharge Summary    Forest Villanueva Jr.  MRN: [id number]   CVA   Patient Discharged from inpatient rehab Occupational Therapy on 3/10/17.  Please refer to prior OT note dated on 3/9/17 for functional status.     Assessment:   Patient has met all goals and is not appropriate for therapy.  GOALS:   Occupational Therapy Goals     Not on file      Multidisciplinary Problems (Resolved)        Problem: Occupational Therapy Goal    Goal Priority Disciplines Outcome Interventions   Occupational Therapy Goal   (Resolved)     OT, PT/OT Outcome(s) achieved    Description:  Goals to be met by: 3/14/17     Patient will increase functional independence with ADLs by performing:    Feeding with Set-up Assistance.  UE Dressing with Set-up Assistance.  LE Dressing with Set-up Assistance.  Grooming while seated with Set-up Assistance.  Toileting from toilet with Set-up Assistance for hygiene and clothing management.   Bathing from  shower chair/bench with Set-up Assistance.  Shower transfer with Supervision.  Toilet transfer to toilet with Supervision.              Reasons for Discontinuation of Therapy Services  Satisfactory goal achievement.      Plan:  Patient Discharged to: Home with Home Health Service with wheelchair, rolling walker and transfer tub bench.  Prior to discharge, OTR providing brief verbal family training with pt's sister regarding Supervision with all self care tasks especially bathing as well as with functional transfers. Instructing on transfer to/from transfer tub bench with RW. Providing resource for purchasing Life Alert and estimated cost according to Rafia. Pt's sister verbalizes understanding and in agreement.   GERRY Sheehan, MORGAN

## 2017-03-10 NOTE — PLAN OF CARE
Problem: Patient Care Overview  Goal: Plan of Care Review  Outcome: Ongoing (interventions implemented as appropriate)  Reviewed pOC, voice understanding, continent of B&B, denies discomfort, transfer 1 person min assist, safety maintained

## 2017-03-10 NOTE — PT/OT/SLP PROGRESS
"Physical Therapy         Treatment        Forest Villanueva Jr.   MRN: [id number]     PT Received On: 03/10/17  Total Time (min): 25        Billable Minutes:  Therapeutic Activity 25  Total Minutes: 25    Treatment Type: Treatment  PT/PTA: PTA     PTA Visit Number: 4       General Precautions: Standard, fall, pacemaker  Orthopedic Precautions: Orthopedic Precautions : N/A   Braces: Braces: N/A         Subjective:  Communicated with nurse Remy prior to session.    Pain Ratin/10              Pain Rating Post-Intervention: 0/10    Objective:  Patient found seated in w/c, with Patient found with:  (N/A) sister (Natalie) and brother-in-law present    Functional Mobility:  Bed Mobility: nurse informed PTA that sister already performed bed to w/c transfer with pt      Transfer Training:  Sit to stand:Contact Guard Assistance with Rolling Walker . Sister able to demo good safety with locking w/c. Sister EDU on proper hand placement.  Bed <> Chair: PTA did not observe, however nurse stated that sister performed transfer with pt  Car transfer deferred by pt and sister. Sister stating, "I'm ok. I know how to do that already."    Gait Trainin' with CGA/SBA using RW. Sister, Natalie observed only. Sister and pt edu on proper safety and tech with ambulation. PTA also suggested use of RW for home and community ambulation. Sister and pt able to verbalize good understanding.     Additional Treatment:  PTA demonstrated and explained alll BLE seated therex with sister and pt. Home HEP given with written and picture instructions, as well as blue tb. PTA also reiterated importance of pt getting OOB while at home in order to keep strength and prevent blood clots/further health concerns. Sister, brother-in-law and pt able to verbalize good understanding.     Activity Tolerance:  Patient tolerated treatment well    Patient left up in chair with chair alarm on, nursing notified and family present.    Assessment:  Forest Villanueva Jr. is " a 71 y.o. male with a medical diagnosis of <principal problem not specified>. He presents with impaired functional mobility/independence 2' weakness, R hemiparesis, decreased balance/stability, impaired motor control/planning, decreased endurance/activity tolerance and decreased safety awareness. Pt continues to require increased time to complete all tasks 2' slow pace and delayed processing. Family and pt tolerated family session well and able to verbalize good understanding of all safety precautions/proper tech with transfers and gait.       GOALS:   Physical Therapy Goals        Problem: Physical Therapy Goal    Goal Priority Disciplines Outcome Goal Variances Interventions   Physical Therapy Goal     PT/OT, PT Ongoing (interventions implemented as appropriate)     Description:  Goals to be met by: 3/17/2017     Patient will increase functional independence with mobility by performin). Supine to sit with Modified [place]  2). Sit to supine with Modified [place]  3). Sit to stand transfer with Stand-by Assistance  4). Bed to chair transfer with Stand-by Assistance   5). Gait  x > 150 feet with Stand-by Assistance   6). Wheelchair propulsion x > 200 feet with Modified [place]               PLAN:    Patient to be seen daily  to address the above listed problems via gait training, therapeutic activities, therapeutic exercises  Plan of Care expires: 17  Plan of Care reviewed with: patient         Arleen Bryant, PTA 3/10/2017

## 2017-03-10 NOTE — PROGRESS NOTES
[facility]  Cardiology  Progress Note    Patient Name: Forest Villanueva Jr.  MRN: [id number]  Admission Date: 2/14/2017  Hospital Length of Stay: 24 days  Code Status: No Order   Attending Physician: Aleah Garrido MD   Primary Care Physician: Primary Doctor No  Expected Discharge Date:   Principal Problem:<principal problem not specified>    Subjective:       Interval History: Patient seen earlier today while sitting up in wheelchair.  Alert. NAD. Family with patient. Anticipated discharge home today.  Patient reporting that he feels fine.  Denies complaints. No further episodes of chest pain. HR in 50s today. Paced rhythm on EKG yesterday. No elevation in troponin x 4.       ROS   Constitutional: negative for chills, fatigue, fevers, sweats  Eyes: negative for visual disturbance  Respiratory: negative for cough, SOB, and wheezing  Cardiovascular: negative for chest pain, chest pressure/discomfort, near-syncope, and syncope  Gastrointestinal: negative for abdominal pain, nausea and vomiting  Musculoskeletal:negative for arthralgias, back pain and myalgias  Neurological: negative for dizziness, headaches, sensory changes, confusion.     Objective:     Vital Signs (Most Recent):  Temp: 98.5 °F (36.9 °C) (03/10/17 0557)  Pulse: (!) 52 (03/10/17 0913)  Resp: 18 (03/10/17 0557)  BP: 92/60 (03/10/17 0913)  SpO2: 97 % (03/10/17 0557) Vital Signs (24h Range):  Temp:  [98.4 °F (36.9 °C)-98.6 °F (37 °C)] 98.5 °F (36.9 °C)  Pulse:  [49-52] 52  Resp:  [18] 18  SpO2:  [96 %-100 %] 97 %  BP: ()/(59-81) 92/60     Weight: 67.4 kg (148 lb 8 oz)  Body mass index is 23.97 kg/(m^2).    SpO2: 97 %  O2 Device (Oxygen Therapy): room air      Intake/Output Summary (Last 24 hours) at 03/10/17 1136  Last data filed at 03/10/17 0800   Gross per 24 hour   Intake              720 ml   Output                0 ml   Net              720 ml       Lines/Drains/Airways          No matching active lines, drains, or airways           Physical Exam  General appearance: alert, cooperative and no distress  Head: Normocephalic, atraumatic  Eyes:  conjunctivae/corneas clear. PERRL, EOM's intact.   Neck:  no JVD, supple, symmetrical, trachea midline   Lungs:  Respirations even and unlabored. Bilateral breath sounds clear to auscultation  Heart: regular rhythm, bradycardia, no murmur, rub or gallop. Distant heart tones.   Abdomen: soft, non-tender; bowel sounds normal;  Extremities: extremities normal, atraumatic, no cyanosis or edema    Significant Labs:     Recent Labs  Lab 03/09/17  1805 03/09/17  2310 03/10/17  0606   TROPONINI 0.018 0.023 0.023       Significant Imaging:   CXR 02/10/2017:  Findings:Cardiac size is magnified by AP technique.  Left-sided defibrillator lead extends into the right ventricle.  Changes of sternotomy are noted.  No pleural effusion or pneumothorax is seen.  Multilevel mild degenerative disc disease is seen.  Degenerative changes are seen in the shoulders.  No radiographically apparent pulmonary nodule is seen.  Impression:   1.  No acute radiographic findings in the chest on the single view.    Prior Carotid US done 02/09:  Less than 50% right and left ICA stenosis   Assessment and Plan:       Active Hospital Problems    Diagnosis    Ischemic cardiomyopathy     LVEF 15-20% per echo done on 02/09/2017 at [facility] in [place], [place]      Presence of implantable cardioverter-defibrillator (ICD)     Generator change on 02/13/2017 at [facility] in [place], [place] - Single Chamber, Biotronik      CAD (coronary artery disease)    Hx of CABG    HTN (hypertension)    H/O noncompliance with medical treatment, presenting hazards to health    Tobacco abuse    PVD (peripheral vascular disease)    Mild malnutrition    CVA (cerebral vascular accident)     No elevation in troponin x 4.  CXR done this morning did not reveal any acute findings.  No further episodes of CP.  Patient denied having any  complaints today.  Discharge to home from rehab planned for today.  Patient remains unsure of which cardiologist he has seen in the past but is expressing that he would like to f/u with Dr. Munir Larson in [place].  Dr. Larson was the cardiologist that saw the patient while during admission at [facility] in [place] and did ICD generator change on 02/13/2017.      - started on high intensity statin (lipitor 80mg) yesterday.  Continue statin.   - continue ASA, Plavix, ACEI and BB  - cardiology f/u with Dr. Larson within 2 weeks for follow up and device (ICD) check  - smoking cessation     No further recommendation or workup at this time from a cardiology standpoint.  Thank you for this consultation and allowing us to participate in Mr. Villanueva's care.  Will sign off.  Please call for any questions.      Total patient time was 33  minutes and greater than 50% was spent in counseling and coordination of care.  Referring physician's note reviewed. Labs and procedures reviewed.   Problem List reviewed.       Jaja Mahoney NP  Cardiology   [facility]

## 2017-03-10 NOTE — PT/OT/SLP DISCHARGE
Speech Language Pathology Discharge Summary    Forest Villanueva Jr.  MRN: [id number]   The encounter diagnosis was CVA (cerebral vascular accident).      Date of Last Treatment Session: 3/9/2017    History reviewed. No pertinent past medical history.    Status at initiation of therapy:  Mild Dysarthria, S/S Oropharyngeal Dysphagia, Moderate Cognitive-Linguistic Impairment  Treatment Area(s):  Communication, Cognition, Motor Speech and Swallow    Goals:   SLP Goals        Problem: SLP Goal    Goal Priority Disciplines Outcome   SLP Goal     SLP Ongoing (interventions implemented as appropriate)   Description:  Short Term Goals modified 3/7/2017:  1. Patient will state compensatory strategies for oral clearance/safe swallow strategies and demonstrate use of strategies MIN A  2. Upgraded 3/7/2017: Patient will tolerate Level II Dysphagia DIet (finely chopped meats) with thin liquids w/o overt S/S aspiration, MIN A, to improve swallow  3. Patient will complete OMEs x20 ea, MIN A, to improve R lingual/labial coordination and strength  4. Patient will attend to items on R side of tray with MIN A for demonstrated use of compensatory strategies for visiospatial skills  5. Patient will name 8 - 10 items/minute in a concrete category, MIN A, to improve word-finding and response time   6. Patient will demo clear speech strategies at the conversational level 90% of time or more, MIN A, to improve speech  7. Patient will recall 3/3 related items for imm, and post 3 minute filled delay, MIN A, to improve memory- MET      Patient will recall details of picture scenes after 5 minutes using learned memory strategies with MIN A  8. NEW: Patient will tolerate trials of thin liquids w/o overt S/S aspiration, MIN A for use of safe swallow strategies, to improve swallow   Long Term Goals:  1. Patient will tolerate least restrictive diet without S/S aspiration, and good oral clearance, Supervision  2. Educate Patient and family on POC and  compensatory strategies for safe swallow   3. Patient will use functional memory strategies to recall events of the day and safely complete ADLs, with Supervision                     Participation in Treatment (at discharge):  Cooperative    Functional Status at time of Discharge:    Cognition: Patient demonstrates moderate cognitive deficits.    Communication: Patient demonstrates minimal communication deficits.    Language: Patient demonstrates minimal language deficits.    Motor Speech: Patient demonstrates minimal motor speech deficits.    Swallow: Patient demonstrates minimal  Oral Dysphagia.                     Clinical Bedside assessment was completed on 2/15/17                       Instrumental assessment was not completed                                Swallowing Guidelines: Patient tolerating  thin liquids, mechanical soft and chopped meats/extra gravy 2/2 dentition and prolonged mastication.  Patient with lose R upper molar            Patient is discharged to Home.  He would benefit from ongoing HH ST following d/c from IR[facility] to continue to improve speech, language and cognition. His sister was educated on ongoing supervision for any mediation/time/scheduleing management tasks 2/2 vision and problem solving this service day. His sister v/u.     FIMS at time of discharge are as follow: Comprehension: 5, Expression: 4, Social Interaction: 5, Problem Solvin, Memory:4    MILE Lew., CCC-SLP  3/10/2017

## 2017-03-11 NOTE — PLAN OF CARE
03/11/17 1000   Final Note   Assessment Type Final Discharge Note   Discharge Disposition Home   Discharge planning education complete? Yes

## 2017-03-11 NOTE — DISCHARGE SUMMARY
DATE OF ADMISSION:  02/14/2017.    DATE OF DISCHARGE:  03/10/2017.    ATTENDING:  Dr. Garrido.    ADMIT DIAGNOSES:  1.  CVA, right edwardo.  2.  Hypertension.  3.  History of coronary artery disease and coronary artery bypass graft.  4.  History of pacemaker.    DISCHARGE DIAGNOSES:  1.  CVA, right edwardo.  2.  Hypertension.  3.  History of coronary artery disease and coronary artery bypass graft.  4.  History of pacemaker.    CONDITION:  Stable.    ACTIVITY:  As tolerated, fall precaution.    HOSPITAL COURSE:  The patient is a 71-year-old gentleman whom during the course   of this hospitalization has overall done well as participant with all aspects of   his therapy and has made significant progress.  As of 03/09/2017, patient with   episode of chest pain, which resulted in obtaining EKG as well as a troponin 1   and consult to Cardiology.  The patient's workup has been negative and will have   further evaluation as outpatient with his cardiologist.  Otherwise, from a   functional standpoint, his skin is intact and is continent of the bowel.  He is   continent of bladder with only 20% of the time and incontinence noted primarily   at nighttime.  His diet is low-fat, low-cholesterol diet.  Appetite is fair.  He   is not complaining of any pain.  As of now, he is at standby assist for supine   to sit and standby assist for sit to stand.  He is ambulating approximately 100   to 125 feet at standby assist with use of rolling walker.  He is able to propel   his wheelchair for greater than 200 feet at standby assist.  His passive range   of motion is within functional limits.  He is able to negotiate 6 x 4 inch steps   at contact guard assist.  His strength for right lower extremity is 4- to 4/5   and left lower extremity is 4 to 4+/5.  He is at supervision for feeding and   grooming, supervision for upper body dressing, standby assist for lower body   dressing, contact guard assist for bathing and toileting, contact guard  assist   for toilet transfer and tub transfer.  His strengths for left upper extremity is   3- to 3+/5.   Right upper extremity is 4- to 4/5.  He is at supervision for   swallowing due to oral dysphagia.  His Mini-Mental status is 11/30.  He is at   mod assist for memory.  He is at min assist to supervision for receptive   language.  He is at min assist for expressive language and at mod assist to min   assist for problem solving.  The patient continues to benefit from further   therapy.  With that in mind, arrangement for home health therapy is being made.    MEDICATIONS:  Aspirin 81 mg 1 p.o. daily, atorvastatin 80 mg 1 p.o. daily,   Plavix 75 mg 1 p.o. daily, docusate 100 mg 1 p.o. daily, lisinopril 2.5 mg 1   p.o. daily, metoprolol 25 mg 1 p.o. daily.  The patient is also on eye drops.    FOLLOWUP:  1.  Primary care MD.  2.  Cardiology.  3.  Neurology, call for appointment.      YEIMI  dd: 03/10/2017 08:04:58 (CST)  td: 03/10/2017 21:35:12 (CST)  Doc ID   #[id number]  Job ID #[id number]    CC:

## 2017-12-29 NOTE — PT/OT/SLP PROGRESS
Occupational Therapy  Treatment    Forest Villanueva Jr.   MRN: [id number]   Admitting Diagnosis: CVA    OT Date of Treatment: 17   Total Time (min): 75 min      Billable Minutes:  Self Care/Home Management 75  Total Minutes: 75    General Precautions: Standard, aspiration, fall, pacemaker, nectar thick, vision impaired  Orthopedic Precautions: N/A  Braces: N/A    Do you have any cultural, spiritual, Judaism conflicts, given your current situation?: Mormon    Subjective:  Communicated with nurse prior to session.    Pain Ratin/10     Pain Rating Post-Intervention: 0/10    Objective:       Functional Mobility:  Transfer Training:   Sit to stand:Minimal Assistance with Rolling Walker instruction for proper hand placement  Patient tub bench transfer Stand Pivot with Minimal Assistance with Rolling Walker.- instruction with demonstration for correct/safe transfer techniques using Rolling walker; pt requires step by step instruction during the transfer as well for hand / feet placement    Feeding:  Set-up/Stand By Assistance from wheelchair in pt dining room     Grooming:  Stand By Assistance from wheelchair    Bathing:  Patient performed bathing with Minimal Assistance with grab bar, Handheld shower head and tub bench at Shower.  >>pacemaker incision covered and kept dry during bathing; cover removed after pt completed bathing tasks    UE Dressing:  Patient performed UE Dressing with Stand-by Assistance with instruction for edwardo dressing techniques to prevent L shoulder flex/ABD past 90* at Wheelchair.    LE Dressing:  Patient don/doffed socks with Supervision or Set-up Assistance, Patient don/doffed shoes with Supervision or Set-up Assistance, Patient don/doffed adult brief with Minimal Assistance and Patient don/doffed pants with Contact Guard Assistance using Rolling walker- 1 near LOB, but pt correcting with CGA    Balance:   Static Sit: GOOD-: Takes MODERATE challenges from all directions but  inconsistently  Dynamic Sit:  GOOD: Maintains balance through MODERATE excursions of active trunk movement  Static Stand: FAIR+: Takes MINIMAL challenges from all directions  Dynamic stand: FAIR: Needs CONTACT GUARD during gait    Additional Treatment:  -OTR reinforced education for pt/significant other regarding correct/safe transfer techniques, correct t/f techniques to/from tub bench using Rolling walker, adaptive dressing techniques, importance of pt Supervision upon pt's discharge, safety awareness, etc; both will benefit from reinforcement    Patient left up in chair with call button in reach, chair alarm on, nurse notified and PTA present    ASSESSMENT:  Forest Villanueva Jr. is a 71 y.o. male with a medical diagnosis of CVA and presents with improvement towards OT goals. Patient should continue to benefit from skilled OT services per est POC to increase functional independence, mobility, and safety.    Rehab potential is good    Activity tolerance: Good    Discharge recommendations: home     Equipment recommendations:  (TBD; likely BSC and transfer tub bench)     GOALS:   Occupational Therapy Goals        Problem: Occupational Therapy Goal    Goal Priority Disciplines Outcome Interventions   Occupational Therapy Goal     OT, PT/OT Ongoing (interventions implemented as appropriate)    Description:  Goals to be met by: 3/14/17     Patient will increase functional independence with ADLs by performing:    Feeding with Set-up Assistance.  UE Dressing with Set-up Assistance.  LE Dressing with Set-up Assistance.  Grooming while seated with Set-up Assistance.  Toileting from toilet with Set-up Assistance for hygiene and clothing management.   Bathing from  shower chair/bench with Set-up Assistance.  Shower transfer with Supervision.  Toilet transfer to toilet with Supervision.                Plan:  Patient to be seen 6 x/week to address the above listed problems via self-care/home management, community/work re-entry,  therapeutic activities, therapeutic exercises, therapeutic groups, neuromuscular re-education, cognitive retraining, sensory integration, wheelchair management/training  Plan of Care expires: 03/14/17  Plan of Care reviewed with: patient, significant other         GERRY Sheehan LOTR  03/01/2017   Detail Level: Detailed Detail Level: Generalized Detail Level: Zone

## 2018-05-21 ENCOUNTER — TELEPHONE (OUTPATIENT)
Dept: OPHTHALMOLOGY | Facility: CLINIC | Age: 73
End: 2018-05-21

## 2018-05-22 ENCOUNTER — OFFICE VISIT (OUTPATIENT)
Dept: OPHTHALMOLOGY | Facility: CLINIC | Age: 73
End: 2018-05-22
Payer: MEDICARE

## 2018-05-22 DIAGNOSIS — H40.1133 PRIMARY OPEN ANGLE GLAUCOMA (POAG) OF BOTH EYES, SEVERE STAGE: Primary | ICD-10-CM

## 2018-05-22 DIAGNOSIS — H25.13 NUCLEAR SCLEROSIS OF BOTH EYES: ICD-10-CM

## 2018-05-22 PROCEDURE — 99999 PR PBB SHADOW E&M-EST. PATIENT-LVL III: CPT | Mod: PBBFAC,,, | Performed by: OPHTHALMOLOGY

## 2018-05-22 PROCEDURE — 92020 GONIOSCOPY: CPT | Mod: PBBFAC,PO | Performed by: OPHTHALMOLOGY

## 2018-05-22 PROCEDURE — 92002 INTRM OPH EXAM NEW PATIENT: CPT | Mod: S$PBB,,, | Performed by: OPHTHALMOLOGY

## 2018-05-22 PROCEDURE — 99213 OFFICE O/P EST LOW 20 MIN: CPT | Mod: PBBFAC,PO,25 | Performed by: OPHTHALMOLOGY

## 2018-05-22 PROCEDURE — 92020 GONIOSCOPY: CPT | Mod: S$PBB,,, | Performed by: OPHTHALMOLOGY

## 2018-05-22 RX ORDER — FLUTICASONE PROPIONATE 50 MCG
1 SPRAY, SUSPENSION (ML) NASAL DAILY
COMMUNITY

## 2018-05-22 RX ORDER — NITROGLYCERIN 0.4 MG/1
TABLET SUBLINGUAL
Refills: 3 | COMMUNITY
Start: 2018-03-12

## 2018-05-22 RX ORDER — LATANOPROST 50 UG/ML
1 SOLUTION/ DROPS OPHTHALMIC NIGHTLY
Qty: 2.5 ML | Refills: 5 | Status: SHIPPED | OUTPATIENT
Start: 2018-05-22 | End: 2019-05-22

## 2018-05-22 RX ORDER — CLOPIDOGREL BISULFATE 75 MG/1
75 TABLET ORAL DAILY
COMMUNITY

## 2018-05-22 RX ORDER — DORZOLAMIDE HYDROCHLORIDE AND TIMOLOL MALEATE 20; 5 MG/ML; MG/ML
1 SOLUTION/ DROPS OPHTHALMIC 2 TIMES DAILY
Qty: 10 ML | Refills: 5 | Status: SHIPPED | OUTPATIENT
Start: 2018-05-22 | End: 2019-05-22

## 2018-05-22 RX ORDER — BRIMONIDINE TARTRATE 1 MG/ML
1 SOLUTION/ DROPS OPHTHALMIC 2 TIMES DAILY
Qty: 10 ML | Refills: 5 | Status: SHIPPED | OUTPATIENT
Start: 2018-05-22 | End: 2019-05-22

## 2018-05-22 RX ORDER — TAMSULOSIN HYDROCHLORIDE 0.4 MG/1
0.4 CAPSULE ORAL DAILY
COMMUNITY

## 2018-05-22 RX ORDER — METOPROLOL TARTRATE 25 MG/1
25 TABLET, FILM COATED ORAL 2 TIMES DAILY
COMMUNITY

## 2018-05-22 RX ORDER — ATORVASTATIN CALCIUM 80 MG/1
80 TABLET, FILM COATED ORAL DAILY
COMMUNITY

## 2018-05-22 NOTE — PROGRESS NOTES
HPI     Eye Pain    Additional comments: pain in the socket of eye with telephone and has   been hurting as bad as an 8 ever since.//           Glaucoma    Additional comments: glaumoa in OU// Latanoprost QHS OU//  Started by   Hardik last  month//           Comments   pain in the socket of eye with telephone and has been hurting as bad as an   8 ever since.//    Drs said no damage or anything//  glaumoa in OU// Latanoprost QHS OU//  Started by Hardik last  Month//    Agree with above. A doctor down by [facility] Rx'd Brimonidine and Cosopt a   while back, but he is not longer. States last visit that doctor did not   say when he wanted to see him back. Family members give history, state he   started glaucoma drops around 2016. Saw the doctor by [facility] about 3   times.        Last edited by Violeta Hernandez MD on 5/22/2018  2:40 PM.   (History)        ROS     Positive for: Eyes (glaucoma; denies surgery; trauma OD 20+ years ago)    Last edited by Violeta Hernandez MD on 5/22/2018  2:40 PM.   (History)        Assessment /Plan     For exam results, see Encounter Report.    Primary open angle glaucoma (POAG) of both eyes, severe stage  -     Ambulatory Referral to Ophthalmology    Nuclear sclerosis of both eyes      Pt has been to [facility] clinic in [place], but caregiver could not take off work on days when the glaucoma specialist would be there. Saw another doctor by [facility] who did some tests and prescribed eye drops, but then did not schedule follow up. I will re-order Cosopt and Brimonidine to be taken in addition to Latanoprost, but given that he has been referred to glaucoma specialist numerous times in past, I will refer him to Dr. Shah after getting him back on gtts.

## 2020-09-28 NOTE — PT/OT/SLP PROGRESS
"Speech Language Pathology  Treatment    Forest Villanueva Jr.   MRN: [id number]   Admitting Diagnosis: <principal problem not specified>    Diet recommendations: Solid Diet Level: Finely chopped meat  Liquid Diet Level: Nectar Thick Small bites/sips, Alternating bites/sips, 1 bite/sip at a time and Eliminate distractions    SLP Treatment Date: 02/28/17  Speech Start Time: 1535     Speech Stop Time: 1605     Speech Total (min): 30 min       TREATMENT BILLABLE MINUTES:  Speech Therapy Individual 30 and Total Time 30    Has the patient been evaluated by SLP for swallowing? : Yes  Keep patient NPO?: No   General Precautions: Standard, aspiration, nectar thick, fall, vision impaired          Subjective:  "a little" coughing with meals. Pt seen in room. Alert and cooperative. Occasional smile today.     Objective:   2. Patient will tolerate Level II Dysphagia DIet (finely chopped meats) with NECTAR-thickened liquids w/o overt S/S aspiration, MIN A, to improve swallow  5. Patient will name 8 - 10 items/minute in a concrete category, MIN A, to improve word-finding and response time   7. Patient will recall 3/3 related items for imm, and post 3 minute filled delay, MIN A, to improve memory    Assessment:  Forest Villanueva Jr. is a 71 y.o. male with a medical diagnosis of CVA and presents with moderate cognitive-linguistic impairment, dysarthria and oropharyngeal dysphagia. Pt demonstrated fair effort today. He tolerated nectar thick liquids via cup t/o session with no overt s/s penetration/aspiraiton. He recalled 2/3 unrelated words after 3 minutes, independently; 3/3 with MIN A. He was able to recall same three unrelated words at end of session independently. Pt named 6, 5 and 9 items in concrete cateory across 3 attempts despite MOD A.     Discharge recommendations: Discharge Facility/Level Of Care Needs: home     Goals:   SLP Goals        Problem: SLP Goal    Goal Priority Disciplines Outcome   SLP Goal     SLP Ongoing " Procedure Date: 09/28/2020      PROCEDURE:  Ultrasound-guided right internal jugular venous access port placement with fluoroscopy.      PREOPERATIVE DIAGNOSIS:  Cholangiocarcinoma.      POSTOPERATIVE DIAGNOSIS:  Cholangiocarcinoma.      SURGEON:  Fercho Wayne DO.      ASSISTANT:  None.      ANESTHESIA:  Monitored anesthesia care with local.      SPECIMENS:  None.      ESTIMATED BLOOD LOSS:  5 mL.      COMPLICATIONS:  None immediately apparent.      INDICATIONS FOR PROCEDURE:  Fuentes is a 68-year-old male who, unfortunately, was recently diagnosed with hilar cholangiocarcinoma.  He underwent a surgical resection and subsequent need for adjuvant chemotherapy.  He has already received a couple of rounds of chemotherapy, but needs semi-permanent venous access for continued treatment.  I recommended ultrasound-guided venous access port placement.  I described the procedure in detail as well as the risks, benefits, alternatives, and postoperative care and after informed discussion, we agreed to proceed with surgery.      DESCRIPTION OF PROCEDURE:  After the informed consent was obtained, the patient was brought from the preoperative holding area to the operating room, placed in supine position.  Anesthesia was induced.  He was prepped and draped in normal sterile fashion.  Timeout was performed.  After correct patient and correct procedure were verified, we began by instilling 0.25% Marcaine with epinephrine in the right neck overlying the right internal jugular vein, as identified with ultrasound.  I made a small nick incision in this area.  Using ultrasound guidance, I accessed the right internal jugular vein with the access needle without issue.  The guidewire was then placed using Seldinger technique into the vein.  The needle was removed while the wire remained in place.  Wire placement was confirmed with fluoroscopy and the wire was in the inferior vena cava, coursing down the right side of the patient.  I then  (interventions implemented as appropriate)   Description:  Short Term Goals modified 2/23/2017:  1. Patient will state compensatory strategies for oral clearance/safe swallow strategies and demonstrate use of strategies MIN A  2. Patient will tolerate Level II Dysphagia DIet (finely chopped meats) with NECTAR-thickened liquids w/o overt S/S aspiration, MIN A, to improve swallow  3. Patient will complete OMEs x20 ea, MIN A, to improve R lingual/labial coordination and strength  4. Patient will attend to items on R side of tray with MIN A for demonstrated use of compensatory strategies for visiospatial skills  5. Patient will name 8 - 10 items/minute in a concrete category, MIN A, to improve word-finding and response time   6. Patient will demo clear speech strategies at the conversational level 90% of time or more, MIN A, to improve speech  7. Patient will recall 3/3 related items for imm, and post 3 minute filled delay, MIN A, to improve memory  8. NEW: Patient will tolerate trials of thin liquids w/o overt S/S aspiration, MIN A for use of safe swallow strategies, to improve swallow   Long Term Goals:  1. Patient will tolerate least restrictive diet without S/S aspiration, and good oral clearance, Supervision  2. Educate Patient and family on POC and compensatory strategies for safe swallow   3. Patient will use functional memory strategies to recall events of the day and safely complete ADLs, with Supervision                    Plan:   Patient to be seen Therapy Frequency: 5 x/week   Plan of Care expires: 03/10/17  Plan of Care reviewed with: patient  SLP Follow-up?: Yes  SLP - Next Visit Date: 03/01/17           Swetha Clarke CCC-SLP  02/28/2017     turned my attention to the chest wall.  I instilled 0.25% Marcaine with epinephrine for local anesthesia in this area as well as the tract where the catheter was going to be tunneled.  I made a 2-inch incision on the chest using minimal amount of electrocautery to achieve hemostasis.  I created a pocket in the chest cavity.  I then tunneled the catheter from the chest wall up into the neck nick incision.  At this time under direct fluoroscopic guidance and Seldinger technique, I placed the dilator and sheath over the wire.  Dilator and wire were removed.  The catheter was then threaded into the sheath until about 20 cm.  Under direct fluoroscopic guidance, I retracted the catheter until the tip of the catheter was approximately at the level of the geronimo.  At the chest wall, I cut the catheter approximately at 17 cm.  The port was then placed and secured with the hub.  I secured the port into place with 2-0 Vicryl in interrupted fashion at the suture plugs.  At this time, I tested the port with heparin saline solution, aspirated without difficulty and flushed without difficulty.  I then used 500 units of heparin to Hep-Lock the catheter.  One final fluoroscopic image was obtained.  Hemostasis was verified in the chest cavity.  I then closed the chest incision with inverted interrupted 3-0 Vicryl suture for the dermal layer and running subcuticular 4-0 Monocryl suture for the skin.  The neck nick incision was closed with a single interrupted 4-0 Monocryl suture.  Exofin dressing was applied.  At the completion of the case, all instruments, needles and sponges were accounted for after a correct count.  The patient was then awoken from anesthesia and brought to the recovery room in stable condition.         KIKO ABDI DO             D: 2020   T: 2020   MT: BETTINA      Name:     SARAH PAINTING   MRN:      [id number]        Account:        [id number]   :      1952           Procedure Date:  09/28/2020      Document: [id number]

## 2021-07-28 NOTE — PATIENT CARE CONFERENCE
Weekly Staffing Report      Date Admitted: 2/14/2017 :   Staffing Date: 2/21/2017     Patient Active Problem List   Diagnosis    CVA (cerebral vascular accident)          Team Members Present:  Physician Team Member: Dr Garrido  Nursing Team Member: Moose Pickreing RN  Case Management Team Member: Livier Coles CM/SW  PT Team Member: Christ Cowan PT  OT Team Member: Roseann Murphy OT  SLP Team Member: Nicolle MURCIA    Nursing  Skin: Intact   Bowel: Continent  Bladder:continent 50 %   Last BM: 2-20-17  Diet: cardiac, necter thick  Appetite: good   Pain Level: 1/10    Physical Therapy  Supine to Sit:  minimal assist  Sit to Stand: minimal assist  Gait:  feet minimal assist Rolling walker  Wheelchair Mobility: 150-175 feet minimal assist  ROM: prom wfl  Stairs:6 four inch steps minimal assist  Strength: right 3+ to 4-/5, left 4 to 4+/5    Occupational Therapy  Feeding: supervision  Grooming:supervision  UED: standy by assistance  LED: minimal assist  Bathing:minimal assist   Toileting:minimal assist  Toilet Transfer:  minimal assist  Tub Transfer:  minimal assist  Strength: left pending due to pacemaker, otherwise fl, right 3+ to 4-/5    Speech Therapy  Swallow: min a  Due to diet modification  MMS: 8/30  Memory: moderate assist  Receptive Language: moderate assist to min a   Expressive Language: moderate assist to min a   Problem solving: moderate assist            Summary of Progress:  Fair     Barriers to Progress/Discharge: cognition ( pre morbid status )  pacemaker     Tolerates 3 hours of therapy: Yes    Comments:     Estimated Length of Stay: 3-10-17                Mohs Case Number:

## 2022-03-17 ENCOUNTER — TELEPHONE (OUTPATIENT)
Dept: OPHTHALMOLOGY | Facility: CLINIC | Age: 77
End: 2022-03-17
Payer: MEDICARE

## 2022-03-17 NOTE — TELEPHONE ENCOUNTER
----- Message from Rosibel Sousa sent at 3/17/2022  3:11 PM CDT -----  Contact: timbo sister  Patient has a referral that was sent over to get scheduled for cataract and glaucoma with the doctor.  Timbo is going to call back with the patients [facility] Medicare number.  Meantime did you receive his insurance information with the referral.  If so please call back to [facility] at [phone] and thank you

## 2022-03-17 NOTE — TELEPHONE ENCOUNTER
----- Message from Chinyeer Hood sent at 3/17/2022  1:26 PM CDT -----  Subha with [facility] is calling to follow up on a referral that was sent on 3/8 for this patient.  Evaluation for combined glaucoma cataract procedure.  Please call Subha at [phone] with the status of the referral and call the patient to schedule an appt.  Thank you!

## 2022-03-24 ENCOUNTER — TELEPHONE (OUTPATIENT)
Dept: OPHTHALMOLOGY | Facility: CLINIC | Age: 77
End: 2022-03-24
Payer: MEDICARE

## 2022-03-24 NOTE — TELEPHONE ENCOUNTER
Spoke to Nidia informed her I have tried reaching patient x4 no answer or call back. Scheduled appt and gave Nidia info.

## 2022-04-26 ENCOUNTER — OFFICE VISIT (OUTPATIENT)
Dept: OPHTHALMOLOGY | Facility: CLINIC | Age: 77
End: 2022-04-26
Payer: MEDICARE

## 2022-04-26 DIAGNOSIS — H40.1133 PRIMARY OPEN-ANGLE GLAUCOMA, BILATERAL, SEVERE STAGE: Primary | ICD-10-CM

## 2022-04-26 DIAGNOSIS — H25.13 NUCLEAR SCLEROTIC CATARACT, BILATERAL: ICD-10-CM

## 2022-04-26 PROCEDURE — 3288F PR FALLS RISK ASSESSMENT DOCUMENTED: ICD-10-PCS | Mod: CPTII,S$GLB,, | Performed by: OPHTHALMOLOGY

## 2022-04-26 PROCEDURE — 1160F RVW MEDS BY RX/DR IN RCRD: CPT | Mod: CPTII,S$GLB,, | Performed by: OPHTHALMOLOGY

## 2022-04-26 PROCEDURE — 1101F PR PT FALLS ASSESS DOC 0-1 FALLS W/OUT INJ PAST YR: ICD-10-PCS | Mod: CPTII,S$GLB,, | Performed by: OPHTHALMOLOGY

## 2022-04-26 PROCEDURE — 1159F MED LIST DOCD IN RCRD: CPT | Mod: CPTII,S$GLB,, | Performed by: OPHTHALMOLOGY

## 2022-04-26 PROCEDURE — 99999 PR PBB SHADOW E&M-EST. PATIENT-LVL III: ICD-10-PCS | Mod: PBBFAC,,, | Performed by: OPHTHALMOLOGY

## 2022-04-26 PROCEDURE — 1126F AMNT PAIN NOTED NONE PRSNT: CPT | Mod: CPTII,S$GLB,, | Performed by: OPHTHALMOLOGY

## 2022-04-26 PROCEDURE — 99204 OFFICE O/P NEW MOD 45 MIN: CPT | Mod: S$GLB,,, | Performed by: OPHTHALMOLOGY

## 2022-04-26 PROCEDURE — 1126F PR PAIN SEVERITY QUANTIFIED, NO PAIN PRESENT: ICD-10-PCS | Mod: CPTII,S$GLB,, | Performed by: OPHTHALMOLOGY

## 2022-04-26 PROCEDURE — 92020 GONIOSCOPY: CPT | Mod: S$GLB,,, | Performed by: OPHTHALMOLOGY

## 2022-04-26 PROCEDURE — 92020 PR SPECIAL EYE EVAL,GONIOSCOPY: ICD-10-PCS | Mod: S$GLB,,, | Performed by: OPHTHALMOLOGY

## 2022-04-26 PROCEDURE — 1159F PR MEDICATION LIST DOCUMENTED IN MEDICAL RECORD: ICD-10-PCS | Mod: CPTII,S$GLB,, | Performed by: OPHTHALMOLOGY

## 2022-04-26 PROCEDURE — 99204 PR OFFICE/OUTPT VISIT, NEW, LEVL IV, 45-59 MIN: ICD-10-PCS | Mod: S$GLB,,, | Performed by: OPHTHALMOLOGY

## 2022-04-26 PROCEDURE — 1101F PT FALLS ASSESS-DOCD LE1/YR: CPT | Mod: CPTII,S$GLB,, | Performed by: OPHTHALMOLOGY

## 2022-04-26 PROCEDURE — 1160F PR REVIEW ALL MEDS BY PRESCRIBER/CLIN PHARMACIST DOCUMENTED: ICD-10-PCS | Mod: CPTII,S$GLB,, | Performed by: OPHTHALMOLOGY

## 2022-04-26 PROCEDURE — 99999 PR PBB SHADOW E&M-EST. PATIENT-LVL III: CPT | Mod: PBBFAC,,, | Performed by: OPHTHALMOLOGY

## 2022-04-26 PROCEDURE — 3288F FALL RISK ASSESSMENT DOCD: CPT | Mod: CPTII,S$GLB,, | Performed by: OPHTHALMOLOGY

## 2022-04-26 RX ORDER — DORZOLAMIDE HYDROCHLORIDE AND TIMOLOL MALEATE 20; 5 MG/ML; MG/ML
SOLUTION/ DROPS OPHTHALMIC
COMMUNITY
Start: 2022-01-14

## 2022-04-26 RX ORDER — ISOSORBIDE DINITRATE 30 MG/1
30 TABLET ORAL
COMMUNITY
Start: 2021-10-25 | End: 2022-10-25

## 2022-04-26 RX ORDER — BRIMONIDINE TARTRATE 2 MG/ML
SOLUTION/ DROPS OPHTHALMIC
COMMUNITY
Start: 2021-11-26

## 2022-04-26 RX ORDER — FLUOXETINE HYDROCHLORIDE 20 MG/1
1 CAPSULE ORAL DAILY
COMMUNITY
Start: 2021-10-25

## 2022-04-26 NOTE — Clinical Note
Can we fax over a copy of this note over to Dr. Hoffmann's office on [address]?  I was unable to send her a message through epic.

## 2022-04-26 NOTE — PROGRESS NOTES
HPI     Follow-up      Additional comments: Here for Glaucoma eval & Treat. Last seen at   [facility] in March 2022 told pt needs combined surgery but doc doesn't   perform that surgery so ref back her. Denies eye pain today states he has   not has any eye surgery. Using Latanoprost OU HS , Brimonidine OU TID &   Cosopt OU BID  caregiver states she is putting the drops in for patient.           Last edited by Erma Matson on 4/26/2022  2:05 PM. (History)            Assessment /Plan     For exam results, see Encounter Report.    Primary open-angle glaucoma, bilateral, severe stage    Nuclear sclerotic cataract, bilateral      1. Primary open-angle glaucoma, bilateral, severe stage  LP vision OD  Loss of fixation OS    Tmax unknown  Likely a long history of poor compliance and/or late diagnosis  IOP is well controlled, with help from family using drops    Considering his excellent IOP, monocular status, distant location from [place], blood thinners, etc, I don't feel his a good candidate for glaucoma surgery nor MIGs with cataract surgery with me; based on the information I have today.    I think it would be okay for him to have routine cataract surgery OS, which may give him some improved vision, however limited.    Recommend continue  latan qhs OU  Brim bid OU  Dorz/indira bid OU    Will refer back to Dr. Hoffmann for routine cataract surgery   Happy to see again if glaucoma procedure is necessary, but he is not a good candidate for glaucoma surgery due to reasons above    2. Nuclear sclerotic cataract, bilateral  As above

## 2022-04-27 ENCOUNTER — TELEPHONE (OUTPATIENT)
Dept: OPHTHALMOLOGY | Facility: CLINIC | Age: 77
End: 2022-04-27
Payer: MEDICARE

## 2022-04-27 NOTE — TELEPHONE ENCOUNTER
----- Message from Stew Young sent at 4/27/2022  8:19 AM CDT -----  Type: Needs Medical Advice  Who Called: [place] [place]     Best Call Back Number: [phone]  Additional Information: Caller states that she would like a callback regarding needing the patient's clinic notes  Fax- [phone]

## 2023-01-12 NOTE — PLAN OF CARE
Impression: Other vitreous opacities, right eye: H43.391. Plan: Subjective floater in right eye. Retina is flat and intact without signs of hole, break or tear. Patient reassurance. Problem: Patient Care Overview  Goal: Plan of Care Review  Patient awake/alert. Incontinence noted at times. No c/o pain noted. Remains free from falls. Plan of care continued

## 2023-12-12 NOTE — TELEPHONE ENCOUNTER
----- Message from Rosibel Sousa sent at 3/24/2022  3:26 PM CDT -----  Contact: Nidia fernandez/ [facility] is calling to see if you received the referral sent over by Dr Shun fern[facility], I did update his chart w/another phone number and put insurance in the chart.  Call Nidia back at [phone] and thanks.  This referral was sent over on 3/8 and thanks she is refaxing it today.  Please make sure you get the patient scheduled.         ACT 24

## 2025-01-24 NOTE — PT/OT/SLP PROGRESS
Occupational Therapy  Treatment    Forest Villanueva Jr.   MRN: [id number]   Admitting Diagnosis: CVA    OT Date of Treatment: 17   Total Time (min): 75 min      Billable Minutes:  Self Care/Home Management 75  Total Minutes: 75    General Precautions: Standard, aspiration, fall, vision impaired, nectar thick    Do you have any cultural, spiritual, Worship conflicts, given your current situation?: Jehovah's witness    Subjective:  Communicated with nursing prior to session.  Cooperative, patient hadto be coaxed into fully showering as he did not want his hair wet.    Pain Ratin/10        Objective:   Instructed patient with bathing, dressing, and grooming.      Transfer Training:   Sit to stand:Stand-by Assistance and Contact Guard Assistance with Grab bars done during dressing/ undressing.  Bed <> Chair:  Stand Pivot with Stand-by Assistance and Contact Guard Assistance with Grab bars .  Patient performed shower transfer Stand Pivot with Minimal Assistance with Grab bars.    Grooming:  Patient peformed hand washing with Supervision or Set-up Assistance at sitting at sink.  Patient performed face washing with Supervision or Set-up Assistance at sitting at sink.  Patient performed oral hygeine with Stand-by Assistance at standing at sink.  Patient performe hair grooming with Minimal Assistance at sitting at sink.    Bathing:  Patient performed bathing with Moderate Assistance with grab bar, Handheld shower head, long-handled sponge and tub bench at Shower.    UE Dressing:  Patient performed UE Dressing with Supervision or Set-up Assistance with no devices used at Wheelchair.    LE Dressing:  Patient don/doffed socks with Moderate Assistance, Patient don/doffed shoes with Stand-by Assistance, Patient performed don/doffed adult brief with Stand-by Assistance and Patient performed don/doffed pants with Stand-by Assistance    Additional Treatment:  EDUCATION: Transfer safety / Fall prevention    Patient left supine with all  Pt was scratched on lower eyelid and face by cat.     No peds appts available today. Should pt's mom bring pt in to [facility]?    Please call  Telephone Information:   Mobile [phone]     Thanks!   lines intact, call button in reach, bed alarm on and nursing notified    ASSESSMENT:  Forest Villanueva Jr. is a 71 y.o. male with a medical diagnosis of CVA and presents with great tolerance, still needs close supervision for safety.    GOALS:   Occupational Therapy Goals        Problem: Occupational Therapy Goal    Goal Priority Disciplines Outcome Interventions   Occupational Therapy Goal     OT, PT/OT Ongoing (interventions implemented as appropriate)    Description:  Goals to be met by: 3/14/17     Patient will increase functional independence with ADLs by performing:    Feeding with Set-up Assistance.  UE Dressing with Set-up Assistance.  LE Dressing with Set-up Assistance.  Grooming while seated with Set-up Assistance.  Toileting from toilet with Set-up Assistance for hygiene and clothing management.   Bathing from  shower chair/bench with Set-up Assistance.  Shower transfer with Supervision.  Toilet transfer to toilet with Supervision.                Plan:  Cont POC.  Patient to be seen 6 x/week to address the above listed problems via self-care/home management, community/work re-entry, therapeutic activities, therapeutic exercises, therapeutic groups, neuromuscular re-education, wheelchair management/training  Plan of Care expires: 03/14/17  Plan of Care reviewed with: patient         Greg Jovanni, SEBASTIAN  03/03/2017
